# Patient Record
Sex: FEMALE | Race: ASIAN | NOT HISPANIC OR LATINO | URBAN - METROPOLITAN AREA
[De-identification: names, ages, dates, MRNs, and addresses within clinical notes are randomized per-mention and may not be internally consistent; named-entity substitution may affect disease eponyms.]

---

## 2017-08-25 ENCOUNTER — OUTPATIENT (OUTPATIENT)
Dept: OUTPATIENT SERVICES | Facility: HOSPITAL | Age: 83
LOS: 1 days | End: 2017-08-25
Payer: MEDICARE

## 2017-08-25 PROCEDURE — 93970 EXTREMITY STUDY: CPT

## 2017-08-25 PROCEDURE — 93970 EXTREMITY STUDY: CPT | Mod: 26

## 2017-11-08 ENCOUNTER — OUTPATIENT (OUTPATIENT)
Dept: OUTPATIENT SERVICES | Facility: HOSPITAL | Age: 83
LOS: 1 days | End: 2017-11-08

## 2017-11-08 DIAGNOSIS — R06.09 OTHER FORMS OF DYSPNEA: ICD-10-CM

## 2017-11-08 DIAGNOSIS — R07.9 CHEST PAIN, UNSPECIFIED: ICD-10-CM

## 2017-11-08 DIAGNOSIS — R06.9 UNSPECIFIED ABNORMALITIES OF BREATHING: ICD-10-CM

## 2018-10-05 ENCOUNTER — APPOINTMENT (OUTPATIENT)
Dept: NEUROLOGY | Facility: CLINIC | Age: 84
End: 2018-10-05

## 2020-03-13 ENCOUNTER — APPOINTMENT (OUTPATIENT)
Dept: OTOLARYNGOLOGY | Facility: CLINIC | Age: 86
End: 2020-03-13

## 2021-05-19 ENCOUNTER — APPOINTMENT (OUTPATIENT)
Dept: OTOLARYNGOLOGY | Facility: CLINIC | Age: 87
End: 2021-05-19
Payer: MEDICARE

## 2021-05-19 PROCEDURE — 92557 COMPREHENSIVE HEARING TEST: CPT

## 2021-05-19 PROCEDURE — 92550 TYMPANOMETRY & REFLEX THRESH: CPT

## 2021-05-28 ENCOUNTER — APPOINTMENT (OUTPATIENT)
Dept: COLORECTAL SURGERY | Facility: CLINIC | Age: 87
End: 2021-05-28
Payer: MEDICARE

## 2021-05-28 VITALS
BODY MASS INDEX: 29.64 KG/M2 | DIASTOLIC BLOOD PRESSURE: 78 MMHG | WEIGHT: 147 LBS | HEIGHT: 59 IN | SYSTOLIC BLOOD PRESSURE: 128 MMHG | HEART RATE: 81 BPM | TEMPERATURE: 98.2 F

## 2021-05-28 DIAGNOSIS — K62.3 RECTAL PROLAPSE: ICD-10-CM

## 2021-05-28 PROCEDURE — 46600 DIAGNOSTIC ANOSCOPY SPX: CPT

## 2021-05-28 PROCEDURE — 99203 OFFICE O/P NEW LOW 30 MIN: CPT | Mod: 25

## 2021-05-28 NOTE — HISTORY OF PRESENT ILLNESS
[FreeTextEntry1] : 86 y/o Shanghainese/Cantonese and English speaking F presents for evaluation, referred by Dr. Bridgett Garcia ([facility])\par \par Denies abdominal or rectal pain, rectal bleeding or itching\par Denies N/V, change in appetite or weight\par BH:Daily\par Denies constipation, diarrhea or straining\par No use stool softeners, fiber supplements or laxatives\par Admits to limited dietary fiber intake. Currently drinking 2-3 cups of water day\par \par Colonoscopy completed 5/6/21 with Finkelstein\par - mild irregular mucosa\par - semipedunculated polyp, size 12 mm, polypectomy\par - 5 mm sessile polyp rectum, polypectomy\par - mild diverticulosis sigmoid colon\par - 5 mm sessile polyp ascending colon, polypectomy\par - 7 mm sessile polyp cecum colon, polypectomy\par \par Pathology\par A.) colon, cecum, polypectomy: fragments of sessile serrated polyp/adenoma\par B.) colon, ascending, polypectomy: TA\par C.) Rectum, polypectomy: TA, separate fragment of hyperplastic polyp

## 2021-05-28 NOTE — PHYSICAL EXAM
[Excoriation] : no perianal excoriation [Normal] : was normal [None] : there was no rectal mass  [de-identified] : Evidence of right anterior anal verge with mucosal prolapse. No induration. No ulceration. [FreeTextEntry1] : Medical assistant was present for the entire exam.\par \par Anoscopy was performed for evaluation of the patients rectal bleeding  history .\par The risks, benefits and alternatives were reviewed.\par \par A lighted anoscope was passed into the anal canal and the entire anal mucosal surface was inspected..  \par The findings revealed moderate internal hemorrhoids.\par  No masses or lesions were identified.\par \par

## 2021-05-28 NOTE — ASSESSMENT
[FreeTextEntry1] : I reviewed with the patient and her son the findings on examination are consistent with mucosal prolapse.  There is no evidence at this time of malignancy.\par \par Recommend surveillance. Followup 2-3 month repeat evaluation.\par \par Recommend return to the office if there is  pain, bleeding or increase in size.

## 2021-06-30 ENCOUNTER — RESULT REVIEW (OUTPATIENT)
Age: 87
End: 2021-06-30

## 2021-06-30 ENCOUNTER — APPOINTMENT (OUTPATIENT)
Dept: NEPHROLOGY | Facility: CLINIC | Age: 87
End: 2021-06-30
Payer: MEDICARE

## 2021-06-30 VITALS — HEART RATE: 92 BPM | DIASTOLIC BLOOD PRESSURE: 65 MMHG | SYSTOLIC BLOOD PRESSURE: 104 MMHG

## 2021-06-30 DIAGNOSIS — N18.30 CHRONIC KIDNEY DISEASE, STAGE 3 UNSPECIFIED: ICD-10-CM

## 2021-06-30 PROCEDURE — 99204 OFFICE O/P NEW MOD 45 MIN: CPT

## 2021-06-30 RX ORDER — FOLIC ACID/MULTIVIT,IRON,MINER .4-18-35
TABLET,CHEWABLE ORAL
Refills: 0 | Status: ACTIVE | COMMUNITY

## 2021-06-30 RX ORDER — GINKGO BILOBA LEAF EXTRACT 60 MG
CAPSULE ORAL
Refills: 0 | Status: ACTIVE | COMMUNITY

## 2021-06-30 RX ORDER — ASPIRIN 81 MG/1
81 TABLET ORAL
Refills: 0 | Status: ACTIVE | COMMUNITY

## 2021-07-07 PROBLEM — N18.30 CKD (CHRONIC KIDNEY DISEASE), STAGE III: Status: RESOLVED | Noted: 2021-06-30 | Resolved: 2021-07-07

## 2021-07-07 NOTE — PHYSICAL EXAM
[General Appearance - Alert] : alert [General Appearance - In No Acute Distress] : in no acute distress [Sclera] : the sclera and conjunctiva were normal [PERRL With Normal Accommodation] : pupils were equal in size, round, and reactive to light [Extraocular Movements] : extraocular movements were intact [Auscultation Breath Sounds / Voice Sounds] : lungs were clear to auscultation bilaterally [Heart Rate And Rhythm] : heart rate was normal and rhythm regular [Heart Sounds] : normal S1 and S2 [Heart Sounds Gallop] : no gallops [Murmurs] : no murmurs [Heart Sounds Pericardial Friction Rub] : no pericardial rub [Abdomen Soft] : soft [Abdomen Tenderness] : non-tender [] : no hepato-splenomegaly [Abdomen Mass (___ Cm)] : no abdominal mass palpated [No CVA Tenderness] : no ~M costovertebral angle tenderness [Musculoskeletal - Swelling] : no joint swelling seen [FreeTextEntry1] : No oedema

## 2021-07-07 NOTE — ASSESSMENT
[FreeTextEntry1] : 89y/o F born in [facility] with a PMH of HTN (unclear number of years) prevents with referral for CKID.\par fu\par #CKD3 likely long standing related to age and HTN- stable\par UA bland, no GN w/u necessary, no DM\par Check renal/bladder sono- r/o obstruction\par Repeat BMP, cystatin C\par \par #BP controlled on valsartan 40mg daily- denies any hypotensive episodes\par #No anaemia- hgb stable\par #Vit D deficiency on vit D3 supplements\par \par RTC in 1 year or PRN\par \par Mariela Pinto MD\par PGY-4

## 2021-07-07 NOTE — HISTORY OF PRESENT ILLNESS
[FreeTextEntry1] : 89y/o F born in [facility] with a PMH of HTN (unclear number of years) prevents with referral for CKID.\par \par PCP: Dr. Bridgett Garcia\par \par Denies NSAIDs, PPI use. Denies any Fam Hx of CKD, dialysis, transplant need. Denies dysuria, frothy urine, haematuria. \par GFR in 2015 58-68; GFR in 2/2020 58-68 UA bland\par 2/2021- GFR 49-57; A1c 5.8, Hgb 13.7, Vit D 35

## 2021-07-15 ENCOUNTER — APPOINTMENT (OUTPATIENT)
Dept: ULTRASOUND IMAGING | Facility: HOSPITAL | Age: 87
End: 2021-07-15
Payer: MEDICARE

## 2021-07-15 ENCOUNTER — OUTPATIENT (OUTPATIENT)
Dept: OUTPATIENT SERVICES | Facility: HOSPITAL | Age: 87
LOS: 1 days | End: 2021-07-15
Payer: COMMERCIAL

## 2021-07-15 PROCEDURE — 76770 US EXAM ABDO BACK WALL COMP: CPT

## 2021-07-15 PROCEDURE — 76770 US EXAM ABDO BACK WALL COMP: CPT | Mod: 26

## 2021-07-30 LAB
ANION GAP SERPL CALC-SCNC: 14 MMOL/L
APPEARANCE: CLEAR
BILIRUBIN URINE: NEGATIVE
BLOOD URINE: NEGATIVE
BUN SERPL-MCNC: 17 MG/DL
CALCIUM SERPL-MCNC: 9.1 MG/DL
CHLORIDE SERPL-SCNC: 99 MMOL/L
CO2 SERPL-SCNC: 24 MMOL/L
COLOR: YELLOW
CREAT SERPL-MCNC: 1.06 MG/DL
CREAT SPEC-SCNC: 76 MG/DL
CREAT SPEC-SCNC: 76 MG/DL
CREAT/PROT UR: 0.1 RATIO
CYSTATIN C SERPL-MCNC: 1.44 MG/L
GFR/BSA.PRED SERPLBLD CYS-BASED-ARV: 40 ML/MIN
GLUCOSE QUALITATIVE U: NEGATIVE
GLUCOSE SERPL-MCNC: 118 MG/DL
KETONES URINE: NEGATIVE
LEUKOCYTE ESTERASE URINE: ABNORMAL
MICROALBUMIN 24H UR DL<=1MG/L-MCNC: <1.2 MG/DL
MICROALBUMIN/CREAT 24H UR-RTO: NORMAL MG/G
NITRITE URINE: NEGATIVE
PH URINE: 6
POTASSIUM SERPL-SCNC: 4.6 MMOL/L
PROT UR-MCNC: 7 MG/DL
PROTEIN URINE: NEGATIVE
SODIUM SERPL-SCNC: 137 MMOL/L
SPECIFIC GRAVITY URINE: 1.01
UROBILINOGEN URINE: NORMAL

## 2021-08-27 ENCOUNTER — APPOINTMENT (OUTPATIENT)
Dept: COLORECTAL SURGERY | Facility: CLINIC | Age: 87
End: 2021-08-27

## 2022-06-19 NOTE — END OF VISIT
[] : Fellow [FreeTextEntry3] : . [Time Spent: ___ minutes] : I have spent [unfilled] minutes of time on the encounter. No

## 2022-11-02 ENCOUNTER — EMERGENCY (EMERGENCY)
Facility: HOSPITAL | Age: 88
LOS: 1 days | Discharge: HOME | End: 2022-11-02
Attending: EMERGENCY MEDICINE | Admitting: EMERGENCY MEDICINE
Payer: MEDICARE

## 2022-11-02 VITALS
OXYGEN SATURATION: 96 % | RESPIRATION RATE: 18 BRPM | HEIGHT: 59 IN | WEIGHT: 160.94 LBS | SYSTOLIC BLOOD PRESSURE: 170 MMHG | DIASTOLIC BLOOD PRESSURE: 66 MMHG | HEART RATE: 79 BPM | TEMPERATURE: 98 F

## 2022-11-02 VITALS
SYSTOLIC BLOOD PRESSURE: 172 MMHG | OXYGEN SATURATION: 97 % | TEMPERATURE: 98 F | HEART RATE: 89 BPM | RESPIRATION RATE: 18 BRPM | DIASTOLIC BLOOD PRESSURE: 68 MMHG

## 2022-11-02 LAB
ALBUMIN SERPL ELPH-MCNC: 4.1 G/DL — SIGNIFICANT CHANGE UP (ref 3.3–5)
ALP SERPL-CCNC: 97 U/L — SIGNIFICANT CHANGE UP (ref 40–120)
ALT FLD-CCNC: 15 U/L — SIGNIFICANT CHANGE UP (ref 10–45)
ANION GAP SERPL CALC-SCNC: 10 MMOL/L — SIGNIFICANT CHANGE UP (ref 5–17)
APPEARANCE UR: ABNORMAL
AST SERPL-CCNC: 20 U/L — SIGNIFICANT CHANGE UP (ref 10–40)
BASOPHILS # BLD AUTO: 0.09 K/UL — SIGNIFICANT CHANGE UP (ref 0–0.2)
BASOPHILS NFR BLD AUTO: 1 % — SIGNIFICANT CHANGE UP (ref 0–2)
BILIRUB SERPL-MCNC: 0.3 MG/DL — SIGNIFICANT CHANGE UP (ref 0.2–1.2)
BILIRUB UR-MCNC: NEGATIVE — SIGNIFICANT CHANGE UP
BUN SERPL-MCNC: 14 MG/DL — SIGNIFICANT CHANGE UP (ref 7–23)
CALCIUM SERPL-MCNC: 8.8 MG/DL — SIGNIFICANT CHANGE UP (ref 8.4–10.5)
CHLORIDE SERPL-SCNC: 94 MMOL/L — LOW (ref 96–108)
CO2 SERPL-SCNC: 26 MMOL/L — SIGNIFICANT CHANGE UP (ref 22–31)
COLOR SPEC: YELLOW — SIGNIFICANT CHANGE UP
CREAT SERPL-MCNC: 0.7 MG/DL — SIGNIFICANT CHANGE UP (ref 0.5–1.3)
DIFF PNL FLD: NEGATIVE — SIGNIFICANT CHANGE UP
EGFR: 83 ML/MIN/1.73M2 — SIGNIFICANT CHANGE UP
EOSINOPHIL # BLD AUTO: 0.43 K/UL — SIGNIFICANT CHANGE UP (ref 0–0.5)
EOSINOPHIL NFR BLD AUTO: 4.8 % — SIGNIFICANT CHANGE UP (ref 0–6)
GLUCOSE SERPL-MCNC: 110 MG/DL — HIGH (ref 70–99)
GLUCOSE UR QL: NEGATIVE — SIGNIFICANT CHANGE UP
HCT VFR BLD CALC: 37.9 % — SIGNIFICANT CHANGE UP (ref 34.5–45)
HGB BLD-MCNC: 12.8 G/DL — SIGNIFICANT CHANGE UP (ref 11.5–15.5)
IMM GRANULOCYTES NFR BLD AUTO: 0.2 % — SIGNIFICANT CHANGE UP (ref 0–0.9)
KETONES UR-MCNC: NEGATIVE — SIGNIFICANT CHANGE UP
LEUKOCYTE ESTERASE UR-ACNC: NEGATIVE — SIGNIFICANT CHANGE UP
LYMPHOCYTES # BLD AUTO: 2.49 K/UL — SIGNIFICANT CHANGE UP (ref 1–3.3)
LYMPHOCYTES # BLD AUTO: 27.7 % — SIGNIFICANT CHANGE UP (ref 13–44)
MCHC RBC-ENTMCNC: 29 PG — SIGNIFICANT CHANGE UP (ref 27–34)
MCHC RBC-ENTMCNC: 33.8 GM/DL — SIGNIFICANT CHANGE UP (ref 32–36)
MCV RBC AUTO: 85.7 FL — SIGNIFICANT CHANGE UP (ref 80–100)
MONOCYTES # BLD AUTO: 0.95 K/UL — HIGH (ref 0–0.9)
MONOCYTES NFR BLD AUTO: 10.6 % — SIGNIFICANT CHANGE UP (ref 2–14)
NEUTROPHILS # BLD AUTO: 5.01 K/UL — SIGNIFICANT CHANGE UP (ref 1.8–7.4)
NEUTROPHILS NFR BLD AUTO: 55.7 % — SIGNIFICANT CHANGE UP (ref 43–77)
NITRITE UR-MCNC: NEGATIVE — SIGNIFICANT CHANGE UP
NRBC # BLD: 0 /100 WBCS — SIGNIFICANT CHANGE UP (ref 0–0)
PH UR: 6 — SIGNIFICANT CHANGE UP (ref 5–8)
PLATELET # BLD AUTO: 413 K/UL — HIGH (ref 150–400)
POTASSIUM SERPL-MCNC: 4.4 MMOL/L — SIGNIFICANT CHANGE UP (ref 3.5–5.3)
POTASSIUM SERPL-SCNC: 4.4 MMOL/L — SIGNIFICANT CHANGE UP (ref 3.5–5.3)
PROT SERPL-MCNC: 7.8 G/DL — SIGNIFICANT CHANGE UP (ref 6–8.3)
PROT UR-MCNC: NEGATIVE MG/DL — SIGNIFICANT CHANGE UP
RBC # BLD: 4.42 M/UL — SIGNIFICANT CHANGE UP (ref 3.8–5.2)
RBC # FLD: 13.2 % — SIGNIFICANT CHANGE UP (ref 10.3–14.5)
SODIUM SERPL-SCNC: 130 MMOL/L — LOW (ref 135–145)
SP GR SPEC: 1.01 — SIGNIFICANT CHANGE UP (ref 1–1.03)
UROBILINOGEN FLD QL: 0.2 E.U./DL — SIGNIFICANT CHANGE UP
WBC # BLD: 8.99 K/UL — SIGNIFICANT CHANGE UP (ref 3.8–10.5)
WBC # FLD AUTO: 8.99 K/UL — SIGNIFICANT CHANGE UP (ref 3.8–10.5)

## 2022-11-02 PROCEDURE — 80053 COMPREHEN METABOLIC PANEL: CPT

## 2022-11-02 PROCEDURE — 93010 ELECTROCARDIOGRAM REPORT: CPT

## 2022-11-02 PROCEDURE — 36415 COLL VENOUS BLD VENIPUNCTURE: CPT

## 2022-11-02 PROCEDURE — 99283 EMERGENCY DEPT VISIT LOW MDM: CPT

## 2022-11-02 PROCEDURE — 83735 ASSAY OF MAGNESIUM: CPT

## 2022-11-02 PROCEDURE — 93005 ELECTROCARDIOGRAM TRACING: CPT

## 2022-11-02 PROCEDURE — 85025 COMPLETE CBC W/AUTO DIFF WBC: CPT

## 2022-11-02 RX ORDER — SODIUM CHLORIDE 9 MG/ML
1000 INJECTION INTRAMUSCULAR; INTRAVENOUS; SUBCUTANEOUS ONCE
Refills: 0 | Status: COMPLETED | OUTPATIENT
Start: 2022-11-02 | End: 2022-11-02

## 2022-11-02 RX ADMIN — SODIUM CHLORIDE 1000 MILLILITER(S): 9 INJECTION INTRAMUSCULAR; INTRAVENOUS; SUBCUTANEOUS at 21:33

## 2022-11-02 NOTE — ED ADULT NURSE NOTE - OBJECTIVE STATEMENT
Pt reports low Na of 129 from blood work yesterday at PCP. Pt denies any symptoms at this time. No CP/SOB, no dizziness/ lightheadedness, no confusion. Pt AOx2 at baseline. Pt ambulatory with steady gait. Pt daughter at bedside.

## 2022-11-02 NOTE — ED ADULT NURSE NOTE - NS ED NURSE LEVEL OF CONSCIOUSNESS ORIENTATION
I will START or STAY ON the medications listed below when I get home from the hospital:    aspirin 81 mg oral delayed release tablet  -- 1 tab(s) by mouth once a day  -- Indication: For keep stent open    Benicar 40 mg oral tablet  -- 1 tab(s) by mouth once a day  -- Indication: For for high blood pressure    rivaroxaban 15 mg oral tablet  -- 1 tab(s) by mouth every 24 hours  -- Indication: For for afib    glimepiride 2 mg oral tablet  -- 1 tab(s) by mouth once a day  -- Indication: For for diabetes    fenofibrate 145 mg oral tablet  -- 1 tab(s) by mouth once a day  -- Indication: For to lower triglyceride    Crestor 20 mg oral tablet  -- 1 tab(s) by mouth once a day  -- Indication: For for high cholesterol    clopidogrel 75 mg oral tablet  -- 1 tab(s) by mouth once a day  -- Indication: For keep stent open    ALPRAZolam 1 mg oral tablet  -- 1 tab(s) by mouth once a day  -- Indication: For for anxiety    zolpidem 10 mg oral tablet  -- 1 tab(s) by mouth once a day (at bedtime)  -- Indication: For for sleep    Toprol- mg oral tablet, extended release  -- 1 tab(s) by mouth once a day  -- Indication: For for high blood presure    Boniva 150 mg oral tablet  -- 1 tab(s) by mouth once a month  -- Indication: For for postmenapausal osteoporosis    AcipHex 20 mg oral delayed release tablet  -- 1 tab(s) by mouth once a day  -- Indication: For anti acid
Age appropriate behavior

## 2022-11-02 NOTE — ED PROVIDER NOTE - OBJECTIVE STATEMENT
Pt w/ PMHx HTN on Valsartan, HLD on Atorvastatin referred to the ED for Na 129. Pt states she feels well and does not known why she is here. She is accompanied by her daughter whom states she is at her baseline MS. No HA, confusion, seizures. No known GI losses. No diuretic use.

## 2022-11-02 NOTE — ED ADULT NURSE NOTE - NSICDXPASTMEDICALHX_GEN_ALL_CORE_FT
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

## 2022-11-02 NOTE — ED PROVIDER NOTE - NSFOLLOWUPINSTRUCTIONS_ED_ALL_ED_FT
You were referred the ED for low sodium (hyponatremia). This was a mildly sodium (130).  You received 1 liter of normal saline. Please follow up with your primary doctor for further evaluation.      Hyponatremia      Hyponatremia is when the amount of salt (sodium) in a person's blood is too low. When sodium levels are low, the cells absorb extra water, which causes them to swell. The swelling happens throughout the body, but it mostly affects the brain.      What are the causes?    This condition may be caused by:•Certain medical conditions, such as:  •Heart, kidney, or liver problems.      •Thyroid problems.      •Adrenal gland problems.      •Metabolic conditions, such as [place]'s disease or syndrome of inappropriate antidiuresis (SIAD).        •Excessive vomiting, diarrhea, or sweating.      •Certain medicines or illegal drugs.      •Fluids given through an IV.        What increases the risk?    You are more likely to develop this condition if you:  •Have certain medical conditions such as heart, kidney, or liver failure.      •Have a medical condition that causes frequent or excessive diarrhea.      •Participate in intense physical activities, such as marathon running.    •Take certain medicines that affect the sodium and fluid balance in the blood. Some of these medicine types include:  •Diuretics.      •NSAIDs, such as ibuprofen.      •Some opioid pain medicines.      •Some antidepressants.      •Some seizure prevention medicines.          What are the signs or symptoms?    Symptoms of this condition include:  •Headache.      •Nausea and vomiting.      •Being very tired (lethargic).      •Muscle weakness and cramping.      •Loss of appetite.      •Feeling weak or light-headed.      Severe symptoms of this condition include:  •Confusion.      •Agitation.      •Having a rapid heart rate.      •Fainting.      •Seizures.      •Coma.        How is this diagnosed?    This condition is diagnosed based on:  •A physical exam.      •Your medical history.    •Tests, including:  •Blood tests.      •Urine tests.          How is this treated?  A person receiving fluids through an IV in the arm. The person is in a hospital bed.   Treatment for this condition depends on the cause. Treatment may include:  •Getting fluids through an IV that is inserted into one of your veins.      •Medicines to correct the sodium imbalance. If medicines are causing the condition, the medicines will need to be adjusted.      •Limiting your water or fluid intake to get the correct sodium balance, in certain cases.      •Monitoring in the hospital to closely watch your symptoms for improvement.        Follow these instructions at home:  A sign showing that a person should not drink alcohol.   •Take over-the-counter and prescription medicines only as told by your health care provider. Many medicines can make this condition worse. Talk with your health care provider about any medicines that you are currently taking.      • Do not drink alcohol.      •Keep all follow-up visits. This is important.        Contact a health care provider if:    •You develop worsening nausea, fatigue, headache, confusion, or weakness.      •Your symptoms go away and then return.        Get help right away if:    •You have a seizure.      •You faint.      •You have ongoing diarrhea or vomiting.        Summary    •Hyponatremia is when the amount of salt (sodium) in your blood is too low.      •When sodium levels are low, your cells absorb extra water, which causes them to swell.      •The swelling happens throughout the body, but it mostly affects the brain.      •Treatment for this condition depends on the cause. It may include receiving IV fluids, taking or adjusting medicines, limiting fluid intake, and monitoring in the hospital.      This information is not intended to replace advice given to you by your health care provider. Make sure you discuss any questions you have with your health care provider.

## 2022-11-02 NOTE — ED PROVIDER NOTE - PHYSICAL EXAMINATION
Constitutional: Well appearing, awake, alert, oriented to person, place, and in no apparent distress.  ENMT: Airway patent. mildly dry MM  Eyes: Clear bilaterally  Cardiac: Normal rate, regular rhythm.  Heart sounds S1, S2.  No murmurs, rubs or gallops.  Respiratory: Breaths sounds equal and clear b/l. No increased WOB, tachypnea, hypoxia, or accessory mm use. Pt speaks in full sentences.   Gastrointestinal: Abd soft, NT, ND, NABS. No guarding, rebound, or rigidity. No pulsatile abdominal masses.   Musculoskeletal: Range of motion is not limited  Neuro: Alert and oriented x 2, face symmetric and speech fluent. Strength 5/5 x 4 ext and symmetric, nml gross motor movement, nml gait. No focal deficits noted.  Skin: Skin normal color for race, warm, dry and intact. No evidence of rash.  Psych: Alert and oriented to person, place, normal mood and affect. no apparent risk to self or others.

## 2022-11-02 NOTE — ED PROVIDER NOTE - PATIENT PORTAL LINK FT
You can access the FollowMyHealth Patient Portal offered by [facility] by registering at the following website: [email or web address]. By joining [facility]’s FollowMyHealth portal, you will also be able to view your health information using other applications (apps) compatible with our system.

## 2022-11-02 NOTE — ED PROVIDER NOTE - CLINICAL SUMMARY MEDICAL DECISION MAKING FREE TEXT BOX
Hyponatremia, mild. Asymptomatic. IVF. Will d/c w/ f/u PCP Hyponatremia, mild. Asymptomatic. no clear etiology. NO GI losses. No diuretic use. Tolerating PO well. No  IVF. Will d/c w/ f/u PCP

## 2022-11-04 DIAGNOSIS — I10 ESSENTIAL (PRIMARY) HYPERTENSION: ICD-10-CM

## 2022-11-04 DIAGNOSIS — E87.1 HYPO-OSMOLALITY AND HYPONATREMIA: ICD-10-CM

## 2022-11-04 DIAGNOSIS — E78.5 HYPERLIPIDEMIA, UNSPECIFIED: ICD-10-CM

## 2022-11-15 ENCOUNTER — APPOINTMENT (OUTPATIENT)
Dept: NEPHROLOGY | Facility: CLINIC | Age: 88
End: 2022-11-15

## 2022-11-15 VITALS
DIASTOLIC BLOOD PRESSURE: 88 MMHG | SYSTOLIC BLOOD PRESSURE: 165 MMHG | RESPIRATION RATE: 16 BRPM | HEART RATE: 70 BPM | OXYGEN SATURATION: 99 %

## 2022-11-15 DIAGNOSIS — G93.89 OTHER SPECIFIED DISORDERS OF BRAIN: ICD-10-CM

## 2022-11-15 PROCEDURE — 99215 OFFICE O/P EST HI 40 MIN: CPT

## 2022-11-28 LAB
ALBUMIN SERPL ELPH-MCNC: 4.2 G/DL
ANION GAP SERPL CALC-SCNC: 15 MMOL/L
APPEARANCE: CLEAR
BACTERIA: NEGATIVE
BILIRUBIN URINE: NEGATIVE
BLOOD URINE: NEGATIVE
BUN SERPL-MCNC: 13 MG/DL
CALCIUM SERPL-MCNC: 9.4 MG/DL
CHLORIDE ?TM UR-SCNC: 43 MMOL/L
CHLORIDE SERPL-SCNC: 90 MMOL/L
CO2 SERPL-SCNC: 24 MMOL/L
COLOR: YELLOW
CORTIS SERPL-MCNC: 8.1 UG/DL
CREAT SERPL-MCNC: 0.74 MG/DL
CREAT SPEC-SCNC: 61 MG/DL
CREAT/PROT UR: 0.1 RATIO
CYSTATIN C SERPL-MCNC: 1.45 MG/L
EGFR: 78 ML/MIN/1.73M2
GFR/BSA.PRED SERPLBLD CYS-BASED-ARV: 40 ML/MIN/1.73M2
GLUCOSE QUALITATIVE U: NEGATIVE
GLUCOSE SERPL-MCNC: 95 MG/DL
HYALINE CASTS: 1 /LPF
KETONES URINE: NEGATIVE
LEUKOCYTE ESTERASE URINE: ABNORMAL
MICROSCOPIC-UA: NORMAL
NITRITE URINE: NEGATIVE
OSMOLALITY SERPL: 272 MOSMOL/KG
OSMOLALITY UR: 319 MOSM/KG
PH URINE: 6.5
PHOSPHATE SERPL-MCNC: 4.2 MG/DL
POTASSIUM SERPL-SCNC: 4.6 MMOL/L
POTASSIUM UR-SCNC: 38.9 MMOL/L
PROT UR-MCNC: 7 MG/DL
PROTEIN URINE: NEGATIVE
RED BLOOD CELLS URINE: 3 /HPF
SODIUM ?TM SUB UR QN: 38 MMOL/L
SODIUM SERPL-SCNC: 130 MMOL/L
SPECIFIC GRAVITY URINE: 1.01
SQUAMOUS EPITHELIAL CELLS: 6 /HPF
T3 SERPL-MCNC: 119 NG/DL
T4 SERPL-MCNC: 8.7 UG/DL
TSH SERPL-ACNC: 1.78 UIU/ML
UROBILINOGEN URINE: NORMAL
WHITE BLOOD CELLS URINE: 2 /HPF

## 2022-11-28 NOTE — HISTORY OF PRESENT ILLNESS
[FreeTextEntry1] : 88y/o F born in [facility] with here for f/u of HTN, reduced eGFR and euvolemic hyponatremia \par \par PCP: Dr. Bridgett Garcia\par \par Daughter with pt today. Went to ER for hyponatremia 129-132 over the last 2 weeks with urine SG =/> 10.10 \par Doesn't eat much protein. Drinks several glasses of chinese tea, one coffee, maybe one bottle of water daily, 1-2 glasses of milk.  No excessive thirst or polyuria. \par No edema. Gained weight\par No headache, visual changes, dizziness or new neurological weakness. \par \par OTC: GinkoBiloba use for the last few years. MVM, Vit D. No nsaid use.\par

## 2022-11-28 NOTE — ASSESSMENT
[FreeTextEntry1] : 89y/o F born in [facility] with here for f/u of HTN, reduced eGFR and euvolemic hyponatremia \par \par #CKD3 likely long standing related to age and HTN- stable\par UA bland, no GN w/u necessary, no DM. HTN well controlled \par Check renal/bladder sono- r/o obstruction\par \par # Chronic seemingly euvolemic hyponatremia - reviewed diet in detail, fluid restrict 1.5L or less, liberalize Na in diet, increase protein intake by using protein shakes. \par \par Plan\par - check kidney function, u/a with urine Na and osm as well as serum osm, TSH, cortisol\par \par Addendum: reviewed labs, Na 130, Cr stable. Hypoosmolar euvolemic hyponatremia asymptomatic and may be related to her poor protein intake in the setting of large volume of intake. Uosm 300s urins NA 38\par \par

## 2023-01-10 ENCOUNTER — APPOINTMENT (OUTPATIENT)
Dept: NEPHROLOGY | Facility: CLINIC | Age: 89
End: 2023-01-10
Payer: MEDICARE

## 2023-01-10 DIAGNOSIS — I10 ESSENTIAL (PRIMARY) HYPERTENSION: ICD-10-CM

## 2023-01-10 DIAGNOSIS — E55.9 VITAMIN D DEFICIENCY, UNSPECIFIED: ICD-10-CM

## 2023-01-10 DIAGNOSIS — N18.31 CHRONIC KIDNEY DISEASE, STAGE 3A: ICD-10-CM

## 2023-01-10 DIAGNOSIS — D32.0 BENIGN NEOPLASM OF CEREBRAL MENINGES: ICD-10-CM

## 2023-01-10 DIAGNOSIS — E87.1 HYPO-OSMOLALITY AND HYPONATREMIA: ICD-10-CM

## 2023-01-10 PROCEDURE — 99214 OFFICE O/P EST MOD 30 MIN: CPT

## 2023-01-13 NOTE — HISTORY OF PRESENT ILLNESS
[FreeTextEntry1] : 88y/o F born in [facility] here for f/u of HTN, reduced eGFR and euvolemic hyponatremia likely SIADH\par \par PCP: Dr. Bridgett Garcia\par \par Daughter with pt today. She has been doing well, no excessive thirst and following fluid restriction w/o issue. \par No headache, visual changes, dizziness or new neurological weakness. \par \par OTC: GinkoBiloba use for the last few years. MVM, Vit D. No nsaid use.\par

## 2023-01-13 NOTE — ASSESSMENT
[FreeTextEntry1] : 89y/o F born in [facility] here for f/u of HTN, reduced eGFR and euvolemic hyponatremia likely SIADH\par \par #CKD3 likely long standing related to age and HTN- stable\par UA bland, HTN well controlled \par \par # Chronic seemingly euvolemic hyponatremia - reviewed diet in detail, fluid restrict 1.5L or less, liberalize Na in diet, increase protein intake by using protein shakes. \par \par Plan\par - check kidney function, u/a with urine Na and osm as well as serum osm, TSH, cortisol\par \par \par \par

## 2023-02-28 LAB
ALBUMIN SERPL ELPH-MCNC: 3.8 G/DL
ALP BLD-CCNC: 107 U/L
ALT SERPL-CCNC: 18 U/L
ANION GAP SERPL CALC-SCNC: 10 MMOL/L
APPEARANCE: ABNORMAL
AST SERPL-CCNC: 21 U/L
BACTERIA: NEGATIVE
BILIRUB SERPL-MCNC: 0.4 MG/DL
BILIRUBIN URINE: NEGATIVE
BLOOD URINE: NEGATIVE
BUN SERPL-MCNC: 11 MG/DL
CALCIUM SERPL-MCNC: 9.4 MG/DL
CHLORIDE SERPL-SCNC: 93 MMOL/L
CO2 SERPL-SCNC: 29 MMOL/L
COLOR: YELLOW
CREAT SERPL-MCNC: 0.73 MG/DL
CREAT SPEC-SCNC: 88 MG/DL
CREAT/PROT UR: 0.1 RATIO
EGFR: 79 ML/MIN/1.73M2
GLUCOSE QUALITATIVE U: NEGATIVE
GLUCOSE SERPL-MCNC: 101 MG/DL
HYALINE CASTS: 2 /LPF
KETONES URINE: NEGATIVE
LEUKOCYTE ESTERASE URINE: NEGATIVE
MICROSCOPIC-UA: NORMAL
NITRITE URINE: NEGATIVE
OSMOLALITY UR: 466 MOSM/KG
PH URINE: 7
POTASSIUM SERPL-SCNC: 4.7 MMOL/L
PROT SERPL-MCNC: 7.5 G/DL
PROT UR-MCNC: 8 MG/DL
PROTEIN URINE: NEGATIVE
RED BLOOD CELLS URINE: 2 /HPF
SODIUM ?TM SUB UR QN: 102 MMOL/L
SODIUM SERPL-SCNC: 132 MMOL/L
SPECIFIC GRAVITY URINE: 1.01
SQUAMOUS EPITHELIAL CELLS: 12 /HPF
URATE SERPL-MCNC: 7.1 MG/DL
URATE UR-MCNC: 27.5 MG/DL
UROBILINOGEN URINE: NORMAL
WHITE BLOOD CELLS URINE: 2 /HPF

## 2023-06-30 ENCOUNTER — APPOINTMENT (OUTPATIENT)
Dept: NEPHROLOGY | Facility: CLINIC | Age: 89
End: 2023-06-30

## 2025-02-11 ENCOUNTER — APPOINTMENT (OUTPATIENT)
Dept: ULTRASOUND IMAGING | Facility: CLINIC | Age: 89
End: 2025-02-11

## 2025-02-11 ENCOUNTER — APPOINTMENT (OUTPATIENT)
Dept: MAMMOGRAPHY | Facility: CLINIC | Age: 89
End: 2025-02-11
Payer: MEDICARE

## 2025-02-11 ENCOUNTER — APPOINTMENT (OUTPATIENT)
Dept: ULTRASOUND IMAGING | Facility: CLINIC | Age: 89
End: 2025-02-11
Payer: MEDICARE

## 2025-02-11 PROCEDURE — 19084Z: CUSTOM

## 2025-02-11 PROCEDURE — 19083 BX BREAST 1ST LESION US IMAG: CPT | Mod: LT

## 2025-02-11 PROCEDURE — 76641 ULTRASOUND BREAST COMPLETE: CPT | Mod: 50

## 2025-02-11 PROCEDURE — A4648: CPT

## 2025-02-21 ENCOUNTER — NON-APPOINTMENT (OUTPATIENT)
Age: 89
End: 2025-02-21

## 2025-02-21 ENCOUNTER — APPOINTMENT (OUTPATIENT)
Dept: BREAST CENTER | Facility: CLINIC | Age: 89
End: 2025-02-21

## 2025-02-21 ENCOUNTER — LABORATORY RESULT (OUTPATIENT)
Age: 89
End: 2025-02-21

## 2025-02-21 VITALS
SYSTOLIC BLOOD PRESSURE: 121 MMHG | DIASTOLIC BLOOD PRESSURE: 67 MMHG | BODY MASS INDEX: 32.66 KG/M2 | HEIGHT: 59 IN | HEART RATE: 89 BPM | WEIGHT: 162 LBS

## 2025-02-21 DIAGNOSIS — R92.8 OTHER ABNORMAL AND INCONCLUSIVE FINDINGS ON DIAGNOSTIC IMAGING OF BREAST: ICD-10-CM

## 2025-02-21 DIAGNOSIS — Z17.421 MALIGNANT NEOPLASM OF UNSPECIFIED SITE OF UNSPECIFIED FEMALE BREAST: ICD-10-CM

## 2025-02-21 DIAGNOSIS — Z78.9 OTHER SPECIFIED HEALTH STATUS: ICD-10-CM

## 2025-02-21 DIAGNOSIS — C50.919 MALIGNANT NEOPLASM OF UNSPECIFIED SITE OF UNSPECIFIED FEMALE BREAST: ICD-10-CM

## 2025-02-21 LAB
BASOPHILS # BLD AUTO: 0.04 K/UL
BASOPHILS NFR BLD AUTO: 0.5 %
EOSINOPHIL # BLD AUTO: 0.48 K/UL
EOSINOPHIL NFR BLD AUTO: 6.2 %
HCT VFR BLD CALC: 41.3 %
HGB BLD-MCNC: 12.9 G/DL
IMM GRANULOCYTES NFR BLD AUTO: 0.4 %
LYMPHOCYTES # BLD AUTO: 1.84 K/UL
LYMPHOCYTES NFR BLD AUTO: 23.8 %
MAN DIFF?: NORMAL
MCHC RBC-ENTMCNC: 27.5 PG
MCHC RBC-ENTMCNC: 31.2 G/DL
MCV RBC AUTO: 88.1 FL
MONOCYTES # BLD AUTO: 0.98 K/UL
MONOCYTES NFR BLD AUTO: 12.7 %
NEUTROPHILS # BLD AUTO: 4.35 K/UL
NEUTROPHILS NFR BLD AUTO: 56.4 %
PLATELET # BLD AUTO: 432 K/UL
PMV BLD AUTO: 0 /100 WBCS
RBC # BLD: 4.69 M/UL
RBC # FLD: 15.9 %
WBC # FLD AUTO: 7.72 K/UL

## 2025-02-21 PROCEDURE — 99205 OFFICE O/P NEW HI 60 MIN: CPT

## 2025-02-28 ENCOUNTER — APPOINTMENT (OUTPATIENT)
Dept: NUCLEAR MEDICINE | Facility: HOSPITAL | Age: 89
End: 2025-02-28

## 2025-02-28 ENCOUNTER — OUTPATIENT (OUTPATIENT)
Dept: OUTPATIENT SERVICES | Facility: HOSPITAL | Age: 89
LOS: 1 days | End: 2025-02-28
Payer: MEDICARE

## 2025-02-28 ENCOUNTER — NON-APPOINTMENT (OUTPATIENT)
Age: 89
End: 2025-02-28

## 2025-02-28 ENCOUNTER — APPOINTMENT (OUTPATIENT)
Dept: MRI IMAGING | Facility: CLINIC | Age: 89
End: 2025-02-28

## 2025-02-28 PROCEDURE — 78815 PET IMAGE W/CT SKULL-THIGH: CPT

## 2025-02-28 PROCEDURE — 82962 GLUCOSE BLOOD TEST: CPT

## 2025-02-28 PROCEDURE — 78815 PET IMAGE W/CT SKULL-THIGH: CPT | Mod: 26,PI

## 2025-02-28 PROCEDURE — A9552: CPT

## 2025-03-07 ENCOUNTER — NON-APPOINTMENT (OUTPATIENT)
Age: 89
End: 2025-03-07

## 2025-03-11 ENCOUNTER — NON-APPOINTMENT (OUTPATIENT)
Age: 89
End: 2025-03-11

## 2025-03-11 ENCOUNTER — APPOINTMENT (OUTPATIENT)
Dept: HEMATOLOGY ONCOLOGY | Facility: CLINIC | Age: 89
End: 2025-03-11
Payer: MEDICARE

## 2025-03-11 DIAGNOSIS — N18.30 CHRONIC KIDNEY DISEASE, STAGE 3 UNSPECIFIED: ICD-10-CM

## 2025-03-11 DIAGNOSIS — N18.31 CHRONIC KIDNEY DISEASE, STAGE 3A: ICD-10-CM

## 2025-03-11 DIAGNOSIS — Z17.421 MALIGNANT NEOPLASM OF UNSPECIFIED SITE OF UNSPECIFIED FEMALE BREAST: ICD-10-CM

## 2025-03-11 DIAGNOSIS — D32.0 BENIGN NEOPLASM OF CEREBRAL MENINGES: ICD-10-CM

## 2025-03-11 DIAGNOSIS — Z86.79 PERSONAL HISTORY OF OTHER DISEASES OF THE CIRCULATORY SYSTEM: ICD-10-CM

## 2025-03-11 DIAGNOSIS — C50.919 MALIGNANT NEOPLASM OF UNSPECIFIED SITE OF UNSPECIFIED FEMALE BREAST: ICD-10-CM

## 2025-03-11 DIAGNOSIS — C77.3 SECONDARY AND UNSPECIFIED MALIGNANT NEOPLASM OF AXILLA AND UPPER LIMB LYMPH NODES: ICD-10-CM

## 2025-03-11 DIAGNOSIS — F03.90 UNSPECIFIED DEMENTIA W/OUT BEHAVIORAL DISTURBANCE: ICD-10-CM

## 2025-03-11 DIAGNOSIS — C50.912 MALIGNANT NEOPLASM OF UNSPECIFIED SITE OF LEFT FEMALE BREAST: ICD-10-CM

## 2025-03-11 DIAGNOSIS — I10 ESSENTIAL (PRIMARY) HYPERTENSION: ICD-10-CM

## 2025-03-11 DIAGNOSIS — C77.0 MALIGNANT NEOPLASM OF UNSPECIFIED SITE OF LEFT FEMALE BREAST: ICD-10-CM

## 2025-03-11 DIAGNOSIS — E87.1 HYPO-OSMOLALITY AND HYPONATREMIA: ICD-10-CM

## 2025-03-11 LAB
ALBUMIN SERPL ELPH-MCNC: 3.4 G/DL
ALP BLD-CCNC: 85 U/L
ALT SERPL-CCNC: 17 U/L
ANION GAP SERPL CALC-SCNC: 7 MMOL/L
AST SERPL-CCNC: 24 U/L
BASOPHILS # BLD AUTO: 0.04 K/UL
BASOPHILS NFR BLD AUTO: 0.6 %
BILIRUB SERPL-MCNC: 0.2 MG/DL
BUN SERPL-MCNC: 24 MG/DL
CALCIUM SERPL-MCNC: 8.8 MG/DL
CHLORIDE SERPL-SCNC: 98 MMOL/L
CO2 SERPL-SCNC: 27 MMOL/L
CREAT SERPL-MCNC: 0.8 MG/DL
EGFRCR SERPLBLD CKD-EPI 2021: 70 ML/MIN/1.73M2
EOSINOPHIL # BLD AUTO: 0.45 K/UL
EOSINOPHIL NFR BLD AUTO: 6.9 %
GLUCOSE SERPL-MCNC: 97 MG/DL
HBV CORE IGG+IGM SER QL: NONREACTIVE
HBV SURFACE AB SER QL: NONREACTIVE
HBV SURFACE AG SER QL: NONREACTIVE
HCT VFR BLD CALC: 36.8 %
HCV AB SER QL: NONREACTIVE
HCV S/CO RATIO: 0.08 S/CO
HGB BLD-MCNC: 12 G/DL
HIV1+2 AB SPEC QL IA.RAPID: NONREACTIVE
IMM GRANULOCYTES NFR BLD AUTO: 0.3 %
LYMPHOCYTES # BLD AUTO: 1.23 K/UL
LYMPHOCYTES NFR BLD AUTO: 19 %
MAN DIFF?: NORMAL
MCHC RBC-ENTMCNC: 27.8 PG
MCHC RBC-ENTMCNC: 32.6 G/DL
MCV RBC AUTO: 85.4 FL
MONOCYTES # BLD AUTO: 0.87 K/UL
MONOCYTES NFR BLD AUTO: 13.4 %
NEUTROPHILS # BLD AUTO: 3.88 K/UL
NEUTROPHILS NFR BLD AUTO: 59.8 %
PLATELET # BLD AUTO: 404 K/UL
PMV BLD AUTO: 0 /100 WBCS
POTASSIUM SERPL-SCNC: 4.7 MMOL/L
PROT SERPL-MCNC: 8.6 G/DL
RBC # BLD: 4.31 M/UL
RBC # FLD: 15 %
SODIUM SERPL-SCNC: 132 MMOL/L
WBC # FLD AUTO: 6.49 K/UL

## 2025-03-11 PROCEDURE — 99205 OFFICE O/P NEW HI 60 MIN: CPT | Mod: 25

## 2025-03-12 PROBLEM — C50.912: Status: ACTIVE | Noted: 2025-03-12

## 2025-03-12 PROBLEM — C77.3 MALIGNANT NEOPLASM METASTATIC TO LYMPH NODE OF AXILLA: Status: ACTIVE | Noted: 2025-03-12

## 2025-03-12 PROBLEM — F03.90 DEMENTIA WITHOUT BEHAVIORAL DISTURBANCE, PSYCHOTIC DISTURBANCE, MOOD DISTURBANCE, OR ANXIETY, UNSPECIFIED DEMENTIA SEVERITY, UNSPECIFIED DEMENTIA TYPE: Status: ACTIVE | Noted: 2025-03-12

## 2025-03-12 LAB
25(OH)D3 SERPL-MCNC: 53.2 NG/ML
TSH SERPL-ACNC: 2.46 UIU/ML

## 2025-03-21 ENCOUNTER — APPOINTMENT (OUTPATIENT)
Dept: ULTRASOUND IMAGING | Facility: CLINIC | Age: 89
End: 2025-03-21
Payer: MEDICARE

## 2025-03-21 ENCOUNTER — RESULT REVIEW (OUTPATIENT)
Age: 89
End: 2025-03-21

## 2025-03-21 PROCEDURE — 76642 ULTRASOUND BREAST LIMITED: CPT | Mod: RT

## 2025-03-26 ENCOUNTER — NON-APPOINTMENT (OUTPATIENT)
Age: 89
End: 2025-03-26

## 2025-03-26 ENCOUNTER — APPOINTMENT (OUTPATIENT)
Dept: RADIATION ONCOLOGY | Facility: CLINIC | Age: 89
End: 2025-03-26
Payer: MEDICARE

## 2025-03-26 ENCOUNTER — APPOINTMENT (OUTPATIENT)
Dept: BREAST CENTER | Facility: CLINIC | Age: 89
End: 2025-03-26
Payer: MEDICARE

## 2025-03-26 VITALS — WEIGHT: 172 LBS | BODY MASS INDEX: 34.68 KG/M2 | HEIGHT: 59 IN

## 2025-03-26 VITALS
SYSTOLIC BLOOD PRESSURE: 152 MMHG | BODY MASS INDEX: 34.9 KG/M2 | HEART RATE: 81 BPM | DIASTOLIC BLOOD PRESSURE: 77 MMHG | TEMPERATURE: 97.5 F | OXYGEN SATURATION: 98 % | WEIGHT: 172.8 LBS

## 2025-03-26 DIAGNOSIS — C77.0 MALIGNANT NEOPLASM OF UNSPECIFIED SITE OF LEFT FEMALE BREAST: ICD-10-CM

## 2025-03-26 DIAGNOSIS — F03.90 UNSPECIFIED DEMENTIA W/OUT BEHAVIORAL DISTURBANCE: ICD-10-CM

## 2025-03-26 DIAGNOSIS — C50.919 MALIGNANT NEOPLASM OF UNSPECIFIED SITE OF UNSPECIFIED FEMALE BREAST: ICD-10-CM

## 2025-03-26 DIAGNOSIS — Z78.9 OTHER SPECIFIED HEALTH STATUS: ICD-10-CM

## 2025-03-26 DIAGNOSIS — C50.912 MALIGNANT NEOPLASM OF UNSPECIFIED SITE OF LEFT FEMALE BREAST: ICD-10-CM

## 2025-03-26 PROCEDURE — G2211 COMPLEX E/M VISIT ADD ON: CPT

## 2025-03-26 PROCEDURE — 99205 OFFICE O/P NEW HI 60 MIN: CPT

## 2025-03-26 PROCEDURE — 99215 OFFICE O/P EST HI 40 MIN: CPT

## 2025-04-02 ENCOUNTER — NON-APPOINTMENT (OUTPATIENT)
Age: 89
End: 2025-04-02

## 2025-04-08 ENCOUNTER — APPOINTMENT (OUTPATIENT)
Dept: HEMATOLOGY ONCOLOGY | Facility: CLINIC | Age: 89
End: 2025-04-08
Payer: MEDICARE

## 2025-04-08 VITALS
OXYGEN SATURATION: 95 % | DIASTOLIC BLOOD PRESSURE: 67 MMHG | SYSTOLIC BLOOD PRESSURE: 129 MMHG | HEIGHT: 56 IN | TEMPERATURE: 97.9 F | RESPIRATION RATE: 18 BRPM | HEART RATE: 89 BPM

## 2025-04-08 DIAGNOSIS — C50.912 MALIGNANT NEOPLASM OF UNSPECIFIED SITE OF LEFT FEMALE BREAST: ICD-10-CM

## 2025-04-08 DIAGNOSIS — E87.1 HYPO-OSMOLALITY AND HYPONATREMIA: ICD-10-CM

## 2025-04-08 DIAGNOSIS — C50.919 MALIGNANT NEOPLASM OF UNSPECIFIED SITE OF UNSPECIFIED FEMALE BREAST: ICD-10-CM

## 2025-04-08 DIAGNOSIS — Z17.421 MALIGNANT NEOPLASM OF UNSPECIFIED SITE OF UNSPECIFIED FEMALE BREAST: ICD-10-CM

## 2025-04-08 DIAGNOSIS — F03.90 UNSPECIFIED DEMENTIA W/OUT BEHAVIORAL DISTURBANCE: ICD-10-CM

## 2025-04-08 DIAGNOSIS — C77.3 SECONDARY AND UNSPECIFIED MALIGNANT NEOPLASM OF AXILLA AND UPPER LIMB LYMPH NODES: ICD-10-CM

## 2025-04-08 DIAGNOSIS — R92.8 OTHER ABNORMAL AND INCONCLUSIVE FINDINGS ON DIAGNOSTIC IMAGING OF BREAST: ICD-10-CM

## 2025-04-08 DIAGNOSIS — C77.0 MALIGNANT NEOPLASM OF UNSPECIFIED SITE OF LEFT FEMALE BREAST: ICD-10-CM

## 2025-04-08 PROCEDURE — 99214 OFFICE O/P EST MOD 30 MIN: CPT

## 2025-04-08 PROCEDURE — G2211 COMPLEX E/M VISIT ADD ON: CPT

## 2025-04-08 RX ORDER — ONDANSETRON 8 MG/1
8 TABLET, ORALLY DISINTEGRATING ORAL EVERY 8 HOURS
Qty: 20 | Refills: 6 | Status: ACTIVE | COMMUNITY
Start: 2025-04-08 | End: 1900-01-01

## 2025-04-09 ENCOUNTER — NON-APPOINTMENT (OUTPATIENT)
Age: 89
End: 2025-04-09

## 2025-04-14 ENCOUNTER — NON-APPOINTMENT (OUTPATIENT)
Age: 89
End: 2025-04-14

## 2025-04-15 ENCOUNTER — APPOINTMENT (OUTPATIENT)
Dept: INFUSION THERAPY | Facility: CLINIC | Age: 89
End: 2025-04-15

## 2025-04-15 ENCOUNTER — OUTPATIENT (OUTPATIENT)
Dept: OUTPATIENT SERVICES | Facility: HOSPITAL | Age: 89
LOS: 1 days | End: 2025-04-15
Payer: MEDICARE

## 2025-04-15 VITALS
SYSTOLIC BLOOD PRESSURE: 129 MMHG | HEART RATE: 82 BPM | OXYGEN SATURATION: 95 % | DIASTOLIC BLOOD PRESSURE: 76 MMHG | TEMPERATURE: 98 F | RESPIRATION RATE: 18 BRPM

## 2025-04-15 VITALS
DIASTOLIC BLOOD PRESSURE: 70 MMHG | WEIGHT: 169.09 LBS | HEART RATE: 89 BPM | TEMPERATURE: 98 F | HEIGHT: 57 IN | SYSTOLIC BLOOD PRESSURE: 155 MMHG | OXYGEN SATURATION: 95 % | RESPIRATION RATE: 18 BRPM

## 2025-04-15 LAB
ALBUMIN SERPL ELPH-MCNC: 2.7 G/DL — LOW (ref 3.3–5)
ALP SERPL-CCNC: 71 U/L — SIGNIFICANT CHANGE UP (ref 40–120)
ALT FLD-CCNC: 15 U/L — SIGNIFICANT CHANGE UP (ref 10–45)
ANION GAP SERPL CALC-SCNC: 1 MMOL/L — LOW (ref 5–17)
AST SERPL-CCNC: 33 U/L — SIGNIFICANT CHANGE UP (ref 10–40)
BILIRUB SERPL-MCNC: 0.6 MG/DL — SIGNIFICANT CHANGE UP (ref 0.2–1.2)
BUN SERPL-MCNC: 24 MG/DL — HIGH (ref 7–23)
CALCIUM SERPL-MCNC: 8.8 MG/DL — SIGNIFICANT CHANGE UP (ref 8.4–10.5)
CHLORIDE SERPL-SCNC: 103 MMOL/L — SIGNIFICANT CHANGE UP (ref 96–108)
CO2 SERPL-SCNC: 29 MMOL/L — SIGNIFICANT CHANGE UP (ref 22–31)
CREAT SERPL-MCNC: 0.7 MG/DL — SIGNIFICANT CHANGE UP (ref 0.5–1.3)
EGFR: 82 ML/MIN/1.73M2 — SIGNIFICANT CHANGE UP
EGFR: 82 ML/MIN/1.73M2 — SIGNIFICANT CHANGE UP
GLUCOSE SERPL-MCNC: 124 MG/DL — HIGH (ref 70–99)
HCT VFR BLD CALC: 34.1 % — LOW (ref 34.5–45)
HGB BLD-MCNC: 11.2 G/DL — LOW (ref 11.5–15.5)
LYMPHOCYTES # BLD AUTO: 2 K/UL — SIGNIFICANT CHANGE UP (ref 1–3.3)
LYMPHOCYTES # BLD AUTO: 28.2 % — SIGNIFICANT CHANGE UP (ref 13–44)
MCHC RBC-ENTMCNC: 28 PG — SIGNIFICANT CHANGE UP (ref 27–34)
MCHC RBC-ENTMCNC: 32.8 G/DL — SIGNIFICANT CHANGE UP (ref 32–36)
MCV RBC AUTO: 85.3 FL — SIGNIFICANT CHANGE UP (ref 80–100)
NEUTROPHILS # BLD AUTO: 3.3 K/UL — SIGNIFICANT CHANGE UP (ref 1.8–7.4)
NEUTROPHILS NFR BLD AUTO: 47.3 % — SIGNIFICANT CHANGE UP (ref 43–77)
PLATELET # BLD AUTO: 402 K/UL — HIGH (ref 150–400)
POTASSIUM SERPL-MCNC: 4.8 MMOL/L — SIGNIFICANT CHANGE UP (ref 3.5–5.3)
POTASSIUM SERPL-SCNC: 4.8 MMOL/L — SIGNIFICANT CHANGE UP (ref 3.5–5.3)
PROT SERPL-MCNC: 7.9 G/DL — SIGNIFICANT CHANGE UP (ref 6–8.3)
RBC # BLD: 4 M/UL — SIGNIFICANT CHANGE UP (ref 3.8–5.2)
RBC # FLD: 15.5 % — HIGH (ref 10.3–14.5)
SODIUM SERPL-SCNC: 133 MMOL/L — LOW (ref 135–145)
WBC # BLD: 7 K/UL — SIGNIFICANT CHANGE UP (ref 3.8–10.5)
WBC # FLD AUTO: 7 K/UL — SIGNIFICANT CHANGE UP (ref 3.8–10.5)

## 2025-04-15 PROCEDURE — 96375 TX/PRO/DX INJ NEW DRUG ADDON: CPT

## 2025-04-15 PROCEDURE — 36415 COLL VENOUS BLD VENIPUNCTURE: CPT

## 2025-04-15 PROCEDURE — 80053 COMPREHEN METABOLIC PANEL: CPT

## 2025-04-15 PROCEDURE — 96411 CHEMO IV PUSH ADDL DRUG: CPT

## 2025-04-15 PROCEDURE — 96413 CHEMO IV INFUSION 1 HR: CPT

## 2025-04-15 PROCEDURE — 85025 COMPLETE CBC W/AUTO DIFF WBC: CPT

## 2025-04-15 RX ORDER — CYCLOPHOSPHAMIDE INJECTION, SOLUTION 200 MG/ML
850 INJECTION INTRAVENOUS ONCE
Refills: 0 | Status: COMPLETED | OUTPATIENT
Start: 2025-04-15 | End: 2025-04-15

## 2025-04-15 RX ORDER — DEXAMETHASONE 0.5 MG/1
10 TABLET ORAL ONCE
Refills: 0 | Status: COMPLETED | OUTPATIENT
Start: 2025-04-15 | End: 2025-04-15

## 2025-04-15 RX ORDER — PALONOSETRON HYDROCHLORIDE 0.05 MG/ML
0.25 INJECTION, SOLUTION INTRAVENOUS ONCE
Refills: 0 | Status: COMPLETED | OUTPATIENT
Start: 2025-04-15 | End: 2025-04-15

## 2025-04-15 RX ORDER — FLUOROURACIL 50 MG/ML
850 INJECTION, SOLUTION INTRAVENOUS ONCE
Refills: 0 | Status: COMPLETED | OUTPATIENT
Start: 2025-04-15 | End: 2025-04-15

## 2025-04-15 RX ORDER — METHOTREXATE 25 MG/ML
68 INJECTION, SOLUTION INTRA-ARTERIAL; INTRAMUSCULAR; INTRATHECAL; INTRAVENOUS ONCE
Refills: 0 | Status: COMPLETED | OUTPATIENT
Start: 2025-04-15 | End: 2025-04-15

## 2025-04-15 RX ADMIN — Medication 500 MILLILITER(S): at 16:10

## 2025-04-15 RX ADMIN — CYCLOPHOSPHAMIDE INJECTION, SOLUTION 850 MILLIGRAM(S): 200 INJECTION INTRAVENOUS at 16:10

## 2025-04-15 RX ADMIN — DEXAMETHASONE 10 MILLIGRAM(S): 0.5 TABLET ORAL at 15:20

## 2025-04-15 RX ADMIN — CYCLOPHOSPHAMIDE INJECTION, SOLUTION 850 MILLIGRAM(S): 200 INJECTION INTRAVENOUS at 15:40

## 2025-04-15 RX ADMIN — DEXAMETHASONE 204 MILLIGRAM(S): 0.5 TABLET ORAL at 15:05

## 2025-04-15 RX ADMIN — FLUOROURACIL 204 MILLIGRAM(S): 50 INJECTION, SOLUTION INTRAVENOUS at 15:35

## 2025-04-15 RX ADMIN — METHOTREXATE 32.64 MILLIGRAM(S): 25 INJECTION, SOLUTION INTRA-ARTERIAL; INTRAMUSCULAR; INTRATHECAL; INTRAVENOUS at 15:30

## 2025-04-15 RX ADMIN — Medication 500 MILLILITER(S): at 16:53

## 2025-04-15 RX ADMIN — PALONOSETRON HYDROCHLORIDE 0.25 MILLIGRAM(S): 0.05 INJECTION, SOLUTION INTRAVENOUS at 15:03

## 2025-04-15 NOTE — DISCHARGE INSTRUCTIONS: CHEMOTHERAPY - DC SYMPTOM 2
Episodes of uncontrolled nausea or vomiting not relieved by anti-nausea medication Ketoconazole Counseling:   Patient counseled regarding improving absorption with orange juice.  Adverse effects include but are not limited to breast enlargement, headache, diarrhea, nausea, upset stomach, liver function test abnormalities, taste disturbance, and stomach pain.  There is a rare possibility of liver failure that can occur when taking ketoconazole. The patient understands that monitoring of LFTs may be required, especially at baseline. The patient verbalized understanding of the proper use and possible adverse effects of ketoconazole.  All of the patient's questions and concerns were addressed.

## 2025-04-16 ENCOUNTER — NON-APPOINTMENT (OUTPATIENT)
Age: 89
End: 2025-04-16

## 2025-05-06 ENCOUNTER — APPOINTMENT (OUTPATIENT)
Dept: HEMATOLOGY ONCOLOGY | Facility: CLINIC | Age: 89
End: 2025-05-06
Payer: MEDICARE

## 2025-05-06 ENCOUNTER — LABORATORY RESULT (OUTPATIENT)
Age: 89
End: 2025-05-06

## 2025-05-06 ENCOUNTER — APPOINTMENT (OUTPATIENT)
Dept: INFUSION THERAPY | Facility: CLINIC | Age: 89
End: 2025-05-06

## 2025-05-06 VITALS
HEIGHT: 56 IN | BODY MASS INDEX: 36.44 KG/M2 | DIASTOLIC BLOOD PRESSURE: 77 MMHG | HEART RATE: 92 BPM | WEIGHT: 162 LBS | TEMPERATURE: 98 F | RESPIRATION RATE: 18 BRPM | SYSTOLIC BLOOD PRESSURE: 149 MMHG | OXYGEN SATURATION: 95 %

## 2025-05-06 DIAGNOSIS — I10 ESSENTIAL (PRIMARY) HYPERTENSION: ICD-10-CM

## 2025-05-06 DIAGNOSIS — Z23 ENCOUNTER FOR IMMUNIZATION: ICD-10-CM

## 2025-05-06 LAB
ALBUMIN SERPL ELPH-MCNC: 2.8 G/DL
ALP BLD-CCNC: 70 U/L
ALT SERPL-CCNC: 13 U/L
ANION GAP SERPL CALC-SCNC: 1 MMOL/L
AST SERPL-CCNC: 37 U/L
BASOPHILS # BLD AUTO: 0.1 K/UL
BASOPHILS NFR BLD AUTO: 1 %
BILIRUB SERPL-MCNC: 0.5 MG/DL
BUN SERPL-MCNC: 18 MG/DL
CALCIUM SERPL-MCNC: 9.7 MG/DL
CHLORIDE SERPL-SCNC: 98 MMOL/L
CO2 SERPL-SCNC: 29 MMOL/L
CREAT SERPL-MCNC: 0.7 MG/DL
EGFRCR SERPLBLD CKD-EPI 2021: 82 ML/MIN/1.73M2
EOSINOPHIL # BLD AUTO: 0.7 K/UL
EOSINOPHIL NFR BLD AUTO: 10 %
GLUCOSE SERPL-MCNC: 124 MG/DL
HCT VFR BLD CALC: 35.8 %
HGB BLD-MCNC: 11.7 G/DL
LYMPHOCYTES # BLD AUTO: 1.32 K/UL
LYMPHOCYTES NFR BLD AUTO: 20 %
MAN DIFF?: YES
MCHC RBC-ENTMCNC: 27.3 PG
MCHC RBC-ENTMCNC: 32.7 G/DL
MCV RBC AUTO: 83.4 FL
MONOCYTES # BLD AUTO: 1.2 K/UL
MONOCYTES NFR BLD AUTO: 18 %
NEUTROPHILS # BLD AUTO: 3.37 K/UL
NEUTROPHILS NFR BLD AUTO: 51 %
PLATELET # BLD AUTO: 611 K/UL
POTASSIUM SERPL-SCNC: 5.3 MMOL/L
PROT SERPL-MCNC: 8.1 G/DL
RBC # BLD: 4.29 M/UL
RBC # FLD: 15.2 %
SODIUM SERPL-SCNC: 128 MMOL/L
WBC # FLD AUTO: 6.6 K/UL

## 2025-05-06 PROCEDURE — 99213 OFFICE O/P EST LOW 20 MIN: CPT | Mod: 25

## 2025-05-13 ENCOUNTER — APPOINTMENT (OUTPATIENT)
Dept: INFUSION THERAPY | Facility: CLINIC | Age: 89
End: 2025-05-13

## 2025-05-13 ENCOUNTER — OUTPATIENT (OUTPATIENT)
Dept: OUTPATIENT SERVICES | Facility: HOSPITAL | Age: 89
LOS: 1 days | End: 2025-05-13
Payer: MEDICARE

## 2025-05-13 ENCOUNTER — APPOINTMENT (OUTPATIENT)
Dept: HEMATOLOGY ONCOLOGY | Facility: CLINIC | Age: 89
End: 2025-05-13
Payer: MEDICARE

## 2025-05-13 VITALS
BODY MASS INDEX: 36.44 KG/M2 | HEART RATE: 93 BPM | RESPIRATION RATE: 18 BRPM | SYSTOLIC BLOOD PRESSURE: 135 MMHG | HEIGHT: 56 IN | DIASTOLIC BLOOD PRESSURE: 71 MMHG | WEIGHT: 162 LBS | OXYGEN SATURATION: 94 % | TEMPERATURE: 98.2 F

## 2025-05-13 VITALS
OXYGEN SATURATION: 95 % | WEIGHT: 162.04 LBS | DIASTOLIC BLOOD PRESSURE: 70 MMHG | TEMPERATURE: 99 F | HEART RATE: 83 BPM | SYSTOLIC BLOOD PRESSURE: 153 MMHG | RESPIRATION RATE: 18 BRPM | HEIGHT: 55 IN

## 2025-05-13 DIAGNOSIS — C50.919 MALIGNANT NEOPLASM OF UNSPECIFIED SITE OF UNSPECIFIED FEMALE BREAST: ICD-10-CM

## 2025-05-13 DIAGNOSIS — C50.912 MALIGNANT NEOPLASM OF UNSPECIFIED SITE OF LEFT FEMALE BREAST: ICD-10-CM

## 2025-05-13 DIAGNOSIS — Z17.421 MALIGNANT NEOPLASM OF UNSPECIFIED SITE OF UNSPECIFIED FEMALE BREAST: ICD-10-CM

## 2025-05-13 DIAGNOSIS — F03.90 UNSPECIFIED DEMENTIA W/OUT BEHAVIORAL DISTURBANCE: ICD-10-CM

## 2025-05-13 DIAGNOSIS — C77.0 MALIGNANT NEOPLASM OF UNSPECIFIED SITE OF LEFT FEMALE BREAST: ICD-10-CM

## 2025-05-13 DIAGNOSIS — E87.1 HYPO-OSMOLALITY AND HYPONATREMIA: ICD-10-CM

## 2025-05-13 DIAGNOSIS — C77.3 SECONDARY AND UNSPECIFIED MALIGNANT NEOPLASM OF AXILLA AND UPPER LIMB LYMPH NODES: ICD-10-CM

## 2025-05-13 LAB
ALBUMIN SERPL ELPH-MCNC: 2.7 G/DL
ALP BLD-CCNC: 51 U/L
ALT SERPL-CCNC: 16 U/L
ANION GAP SERPL CALC-SCNC: 0 MMOL/L
AST SERPL-CCNC: 25 U/L
BILIRUB SERPL-MCNC: 0.6 MG/DL
BUN SERPL-MCNC: 18 MG/DL
CALCIUM SERPL-MCNC: 8.8 MG/DL
CHLORIDE SERPL-SCNC: 102 MMOL/L
CO2 SERPL-SCNC: 29 MMOL/L
CREAT SERPL-MCNC: 0.9 MG/DL
EGFRCR SERPLBLD CKD-EPI 2021: 61 ML/MIN/1.73M2
GLUCOSE SERPL-MCNC: 122 MG/DL
HCT VFR BLD CALC: 35.5 %
HGB BLD-MCNC: 11.7 G/DL
LYMPHOCYTES # BLD AUTO: 1.1 K/UL
LYMPHOCYTES NFR BLD AUTO: 16.4 %
MAN DIFF?: NO
MCHC RBC-ENTMCNC: 27.6 PG
MCHC RBC-ENTMCNC: 33 G/DL
MCV RBC AUTO: 83.7 FL
NEUTROPHILS # BLD AUTO: 4 K/UL
NEUTROPHILS NFR BLD AUTO: 60.9 %
PLATELET # BLD AUTO: 408 K/UL
POTASSIUM SERPL-SCNC: 5 MMOL/L
PROT SERPL-MCNC: 7.9 G/DL
RBC # BLD: 4.24 M/UL
RBC # FLD: 15.2 %
SODIUM SERPL-SCNC: 131 MMOL/L
WBC # FLD AUTO: 6.6 K/UL

## 2025-05-13 PROCEDURE — 96413 CHEMO IV INFUSION 1 HR: CPT

## 2025-05-13 PROCEDURE — 96375 TX/PRO/DX INJ NEW DRUG ADDON: CPT

## 2025-05-13 PROCEDURE — 96411 CHEMO IV PUSH ADDL DRUG: CPT

## 2025-05-13 PROCEDURE — 99213 OFFICE O/P EST LOW 20 MIN: CPT | Mod: 25

## 2025-05-13 RX ORDER — PALONOSETRON HYDROCHLORIDE 0.05 MG/ML
0.25 INJECTION, SOLUTION INTRAVENOUS ONCE
Refills: 0 | Status: COMPLETED | OUTPATIENT
Start: 2025-05-13 | End: 2025-05-13

## 2025-05-13 RX ORDER — CYCLOPHOSPHAMIDE INJECTION, SOLUTION 200 MG/ML
850 INJECTION INTRAVENOUS ONCE
Refills: 0 | Status: COMPLETED | OUTPATIENT
Start: 2025-05-13 | End: 2025-05-13

## 2025-05-13 RX ORDER — METHOTREXATE 25 MG/ML
68 INJECTION, SOLUTION INTRA-ARTERIAL; INTRAMUSCULAR; INTRATHECAL; INTRAVENOUS ONCE
Refills: 0 | Status: COMPLETED | OUTPATIENT
Start: 2025-05-13 | End: 2025-05-13

## 2025-05-13 RX ORDER — FLUOROURACIL 50 MG/ML
850 INJECTION, SOLUTION INTRAVENOUS ONCE
Refills: 0 | Status: COMPLETED | OUTPATIENT
Start: 2025-05-13 | End: 2025-05-13

## 2025-05-13 RX ORDER — DEXAMETHASONE 0.5 MG/1
10 TABLET ORAL ONCE
Refills: 0 | Status: COMPLETED | OUTPATIENT
Start: 2025-05-13 | End: 2025-05-13

## 2025-05-13 RX ADMIN — DEXAMETHASONE 10 MILLIGRAM(S): 0.5 TABLET ORAL at 16:34

## 2025-05-13 RX ADMIN — METHOTREXATE 32.64 MILLIGRAM(S): 25 INJECTION, SOLUTION INTRA-ARTERIAL; INTRAMUSCULAR; INTRATHECAL; INTRAVENOUS at 16:35

## 2025-05-13 RX ADMIN — Medication 500 MILLILITER(S): at 17:27

## 2025-05-13 RX ADMIN — PALONOSETRON HYDROCHLORIDE 0.25 MILLIGRAM(S): 0.05 INJECTION, SOLUTION INTRAVENOUS at 16:36

## 2025-05-13 RX ADMIN — DEXAMETHASONE 204 MILLIGRAM(S): 0.5 TABLET ORAL at 16:19

## 2025-05-13 RX ADMIN — FLUOROURACIL 204 MILLIGRAM(S): 50 INJECTION, SOLUTION INTRAVENOUS at 16:36

## 2025-05-13 RX ADMIN — CYCLOPHOSPHAMIDE INJECTION, SOLUTION 850 MILLIGRAM(S): 200 INJECTION INTRAVENOUS at 16:37

## 2025-05-13 RX ADMIN — CYCLOPHOSPHAMIDE INJECTION, SOLUTION 850 MILLIGRAM(S): 200 INJECTION INTRAVENOUS at 17:25

## 2025-06-03 ENCOUNTER — OUTPATIENT (OUTPATIENT)
Dept: OUTPATIENT SERVICES | Facility: HOSPITAL | Age: 89
LOS: 1 days | End: 2025-06-03
Payer: MEDICARE

## 2025-06-03 ENCOUNTER — APPOINTMENT (OUTPATIENT)
Dept: INFUSION THERAPY | Facility: CLINIC | Age: 89
End: 2025-06-03

## 2025-06-03 ENCOUNTER — LABORATORY RESULT (OUTPATIENT)
Age: 89
End: 2025-06-03

## 2025-06-03 ENCOUNTER — APPOINTMENT (OUTPATIENT)
Dept: HEMATOLOGY ONCOLOGY | Facility: CLINIC | Age: 89
End: 2025-06-03
Payer: MEDICARE

## 2025-06-03 VITALS
SYSTOLIC BLOOD PRESSURE: 102 MMHG | OXYGEN SATURATION: 96 % | WEIGHT: 157 LBS | BODY MASS INDEX: 35.32 KG/M2 | DIASTOLIC BLOOD PRESSURE: 46 MMHG | HEIGHT: 56 IN | HEART RATE: 92 BPM | RESPIRATION RATE: 18 BRPM

## 2025-06-03 VITALS
DIASTOLIC BLOOD PRESSURE: 74 MMHG | WEIGHT: 156.97 LBS | OXYGEN SATURATION: 94 % | TEMPERATURE: 99 F | SYSTOLIC BLOOD PRESSURE: 139 MMHG | RESPIRATION RATE: 17 BRPM | HEART RATE: 86 BPM | HEIGHT: 56 IN

## 2025-06-03 VITALS
RESPIRATION RATE: 19 BRPM | OXYGEN SATURATION: 95 % | DIASTOLIC BLOOD PRESSURE: 77 MMHG | HEART RATE: 82 BPM | SYSTOLIC BLOOD PRESSURE: 118 MMHG | TEMPERATURE: 99 F

## 2025-06-03 DIAGNOSIS — C50.919 MALIGNANT NEOPLASM OF UNSPECIFIED SITE OF UNSPECIFIED FEMALE BREAST: ICD-10-CM

## 2025-06-03 DIAGNOSIS — C77.3 SECONDARY AND UNSPECIFIED MALIGNANT NEOPLASM OF AXILLA AND UPPER LIMB LYMPH NODES: ICD-10-CM

## 2025-06-03 DIAGNOSIS — C50.912 MALIGNANT NEOPLASM OF UNSPECIFIED SITE OF LEFT FEMALE BREAST: ICD-10-CM

## 2025-06-03 DIAGNOSIS — C77.0 MALIGNANT NEOPLASM OF UNSPECIFIED SITE OF LEFT FEMALE BREAST: ICD-10-CM

## 2025-06-03 DIAGNOSIS — F03.90 UNSPECIFIED DEMENTIA W/OUT BEHAVIORAL DISTURBANCE: ICD-10-CM

## 2025-06-03 DIAGNOSIS — Z17.421 MALIGNANT NEOPLASM OF UNSPECIFIED SITE OF UNSPECIFIED FEMALE BREAST: ICD-10-CM

## 2025-06-03 LAB
ALBUMIN SERPL ELPH-MCNC: 3 G/DL
ALP BLD-CCNC: 66 U/L
ALT SERPL-CCNC: 12 U/L
ANION GAP SERPL CALC-SCNC: 13 MMOL/L
AST SERPL-CCNC: 27 U/L
BASOPHILS # BLD AUTO: 0.1 K/UL
BASOPHILS NFR BLD AUTO: 1 %
BILIRUB SERPL-MCNC: 0.9 MG/DL
BUN SERPL-MCNC: 17 MG/DL
CALCIUM SERPL-MCNC: 8.9 MG/DL
CHLORIDE SERPL-SCNC: 94 MMOL/L
CO2 SERPL-SCNC: 28 MMOL/L
CREAT SERPL-MCNC: 0.9 MG/DL
EGFRCR SERPLBLD CKD-EPI 2021: 61 ML/MIN/1.73M2
EOSINOPHIL # BLD AUTO: 0.5 K/UL
EOSINOPHIL NFR BLD AUTO: 8 %
GLUCOSE SERPL-MCNC: 138 MG/DL
HCT VFR BLD CALC: 37.7 %
HGB BLD-MCNC: 12.1 G/DL
LYMPHOCYTES # BLD AUTO: 0.8 K/UL
LYMPHOCYTES NFR BLD AUTO: 14 %
MAN DIFF?: YES
MCHC RBC-ENTMCNC: 27.6 PG
MCHC RBC-ENTMCNC: 32.1 G/DL
MCV RBC AUTO: 86.1 FL
MONOCYTES # BLD AUTO: 0.3 K/UL
MONOCYTES NFR BLD AUTO: 6 %
NEUTROPHILS # BLD AUTO: 3.5 K/UL
NEUTROPHILS NFR BLD AUTO: 60 %
PLATELET # BLD AUTO: 450 K/UL
POTASSIUM SERPL-SCNC: 3.1 MMOL/L
PROT SERPL-MCNC: 8.6 G/DL
RBC # BLD: 4.38 M/UL
RBC # FLD: 16.9 %
SODIUM SERPL-SCNC: 135 MMOL/L
WBC # FLD AUTO: 5.8 K/UL

## 2025-06-03 PROCEDURE — 96413 CHEMO IV INFUSION 1 HR: CPT

## 2025-06-03 PROCEDURE — 96375 TX/PRO/DX INJ NEW DRUG ADDON: CPT

## 2025-06-03 PROCEDURE — 99213 OFFICE O/P EST LOW 20 MIN: CPT | Mod: 25

## 2025-06-03 PROCEDURE — 96411 CHEMO IV PUSH ADDL DRUG: CPT

## 2025-06-03 RX ORDER — DEXAMETHASONE 0.5 MG/1
10 TABLET ORAL ONCE
Refills: 0 | Status: COMPLETED | OUTPATIENT
Start: 2025-06-03 | End: 2025-06-03

## 2025-06-03 RX ORDER — CYCLOPHOSPHAMIDE INJECTION, SOLUTION 200 MG/ML
850 INJECTION INTRAVENOUS ONCE
Refills: 0 | Status: COMPLETED | OUTPATIENT
Start: 2025-06-03 | End: 2025-06-03

## 2025-06-03 RX ORDER — PALONOSETRON HYDROCHLORIDE 0.05 MG/ML
0.25 INJECTION, SOLUTION INTRAVENOUS ONCE
Refills: 0 | Status: COMPLETED | OUTPATIENT
Start: 2025-06-03 | End: 2025-06-03

## 2025-06-03 RX ORDER — METHOTREXATE 25 MG/ML
68 INJECTION, SOLUTION INTRA-ARTERIAL; INTRAMUSCULAR; INTRATHECAL; INTRAVENOUS ONCE
Refills: 0 | Status: COMPLETED | OUTPATIENT
Start: 2025-06-03 | End: 2025-06-03

## 2025-06-03 RX ORDER — FLUOROURACIL 50 MG/ML
850 INJECTION, SOLUTION INTRAVENOUS ONCE
Refills: 0 | Status: COMPLETED | OUTPATIENT
Start: 2025-06-03 | End: 2025-06-03

## 2025-06-03 RX ORDER — UBIDECARENONE 100 MG
100 CAPSULE ORAL TWICE DAILY
Refills: 0 | Status: ACTIVE | COMMUNITY
Start: 2025-06-03

## 2025-06-03 RX ADMIN — Medication 500 MILLILITER(S): at 18:05

## 2025-06-03 RX ADMIN — DEXAMETHASONE 10 MILLIGRAM(S): 0.5 TABLET ORAL at 16:39

## 2025-06-03 RX ADMIN — CYCLOPHOSPHAMIDE INJECTION, SOLUTION 850 MILLIGRAM(S): 200 INJECTION INTRAVENOUS at 18:02

## 2025-06-03 RX ADMIN — CYCLOPHOSPHAMIDE INJECTION, SOLUTION 850 MILLIGRAM(S): 200 INJECTION INTRAVENOUS at 17:32

## 2025-06-03 RX ADMIN — PALONOSETRON HYDROCHLORIDE 0.25 MILLIGRAM(S): 0.05 INJECTION, SOLUTION INTRAVENOUS at 16:25

## 2025-06-03 RX ADMIN — Medication 500 MILLILITER(S): at 19:05

## 2025-06-03 RX ADMIN — FLUOROURACIL 204 MILLIGRAM(S): 50 INJECTION, SOLUTION INTRAVENOUS at 17:26

## 2025-06-03 RX ADMIN — METHOTREXATE 32.64 MILLIGRAM(S): 25 INJECTION, SOLUTION INTRA-ARTERIAL; INTRAMUSCULAR; INTRATHECAL; INTRAVENOUS at 17:18

## 2025-06-03 RX ADMIN — DEXAMETHASONE 204 MILLIGRAM(S): 0.5 TABLET ORAL at 16:24

## 2025-06-24 ENCOUNTER — NON-APPOINTMENT (OUTPATIENT)
Age: 89
End: 2025-06-24

## 2025-06-24 ENCOUNTER — APPOINTMENT (OUTPATIENT)
Dept: HEMATOLOGY ONCOLOGY | Facility: CLINIC | Age: 89
End: 2025-06-24

## 2025-06-25 ENCOUNTER — APPOINTMENT (OUTPATIENT)
Dept: BREAST CENTER | Facility: CLINIC | Age: 89
End: 2025-06-25

## 2025-06-30 ENCOUNTER — NON-APPOINTMENT (OUTPATIENT)
Age: 89
End: 2025-06-30

## 2025-07-01 ENCOUNTER — NON-APPOINTMENT (OUTPATIENT)
Age: 89
End: 2025-07-01

## 2025-07-01 ENCOUNTER — APPOINTMENT (OUTPATIENT)
Dept: INFUSION THERAPY | Facility: CLINIC | Age: 89
End: 2025-07-01

## 2025-07-01 ENCOUNTER — APPOINTMENT (OUTPATIENT)
Dept: HEMATOLOGY ONCOLOGY | Facility: CLINIC | Age: 89
End: 2025-07-01

## 2025-07-02 ENCOUNTER — NON-APPOINTMENT (OUTPATIENT)
Age: 89
End: 2025-07-02

## 2025-07-03 ENCOUNTER — NON-APPOINTMENT (OUTPATIENT)
Age: 89
End: 2025-07-03

## 2025-07-05 ENCOUNTER — INPATIENT (INPATIENT)
Facility: HOSPITAL | Age: 89
LOS: 10 days | Discharge: NURSING, REHAB OR LONG-TERM CARE | End: 2025-07-16
Attending: HOSPITALIST | Admitting: INTERNAL MEDICINE
Payer: MEDICARE

## 2025-07-05 VITALS
HEART RATE: 96 BPM | TEMPERATURE: 98 F | OXYGEN SATURATION: 98 % | HEIGHT: 56 IN | RESPIRATION RATE: 20 BRPM | DIASTOLIC BLOOD PRESSURE: 61 MMHG | SYSTOLIC BLOOD PRESSURE: 90 MMHG

## 2025-07-05 DIAGNOSIS — E87.1 HYPO-OSMOLALITY AND HYPONATREMIA: ICD-10-CM

## 2025-07-05 DIAGNOSIS — L98.429 NON-PRESSURE CHRONIC ULCER OF BACK WITH UNSPECIFIED SEVERITY: ICD-10-CM

## 2025-07-05 DIAGNOSIS — E78.5 HYPERLIPIDEMIA, UNSPECIFIED: ICD-10-CM

## 2025-07-05 DIAGNOSIS — N17.9 ACUTE KIDNEY FAILURE, UNSPECIFIED: ICD-10-CM

## 2025-07-05 DIAGNOSIS — I10 ESSENTIAL (PRIMARY) HYPERTENSION: ICD-10-CM

## 2025-07-05 DIAGNOSIS — F03.90 UNSPECIFIED DEMENTIA, UNSPECIFIED SEVERITY, WITHOUT BEHAVIORAL DISTURBANCE, PSYCHOTIC DISTURBANCE, MOOD DISTURBANCE, AND ANXIETY: ICD-10-CM

## 2025-07-05 DIAGNOSIS — E87.0 HYPEROSMOLALITY AND HYPERNATREMIA: ICD-10-CM

## 2025-07-05 DIAGNOSIS — Z86.011 PERSONAL HISTORY OF BENIGN NEOPLASM OF THE BRAIN: ICD-10-CM

## 2025-07-05 DIAGNOSIS — C50.919 MALIGNANT NEOPLASM OF UNSPECIFIED SITE OF UNSPECIFIED FEMALE BREAST: ICD-10-CM

## 2025-07-05 DIAGNOSIS — N18.30 CHRONIC KIDNEY DISEASE, STAGE 3 UNSPECIFIED: ICD-10-CM

## 2025-07-05 DIAGNOSIS — Z29.9 ENCOUNTER FOR PROPHYLACTIC MEASURES, UNSPECIFIED: ICD-10-CM

## 2025-07-05 LAB
ADD ON TEST-SPECIMEN IN LAB: SIGNIFICANT CHANGE UP
ALBUMIN SERPL ELPH-MCNC: 2.6 G/DL — LOW (ref 3.3–5)
ALBUMIN SERPL ELPH-MCNC: 2.7 G/DL — LOW (ref 3.3–5)
ALP SERPL-CCNC: 65 U/L — SIGNIFICANT CHANGE UP (ref 40–120)
ALP SERPL-CCNC: SIGNIFICANT CHANGE UP (ref 40–120)
ALT FLD-CCNC: 15 U/L — SIGNIFICANT CHANGE UP (ref 10–45)
ALT FLD-CCNC: SIGNIFICANT CHANGE UP (ref 10–45)
ANION GAP SERPL CALC-SCNC: 10 MMOL/L — SIGNIFICANT CHANGE UP (ref 5–17)
ANION GAP SERPL CALC-SCNC: 10 MMOL/L — SIGNIFICANT CHANGE UP (ref 5–17)
ANION GAP SERPL CALC-SCNC: 11 MMOL/L — SIGNIFICANT CHANGE UP (ref 5–17)
ANION GAP SERPL CALC-SCNC: 13 MMOL/L — SIGNIFICANT CHANGE UP (ref 5–17)
ANION GAP SERPL CALC-SCNC: 6 MMOL/L — SIGNIFICANT CHANGE UP (ref 5–17)
APPEARANCE UR: ABNORMAL
APTT BLD: 35.7 SEC — SIGNIFICANT CHANGE UP (ref 26.1–36.8)
AST SERPL-CCNC: 23 U/L — SIGNIFICANT CHANGE UP (ref 10–40)
AST SERPL-CCNC: SIGNIFICANT CHANGE UP (ref 10–40)
BASOPHILS # BLD AUTO: 0.13 K/UL — SIGNIFICANT CHANGE UP (ref 0–0.2)
BASOPHILS NFR BLD AUTO: 1.3 % — SIGNIFICANT CHANGE UP (ref 0–2)
BILIRUB SERPL-MCNC: 0.4 MG/DL — SIGNIFICANT CHANGE UP (ref 0.2–1.2)
BILIRUB SERPL-MCNC: 0.5 MG/DL — SIGNIFICANT CHANGE UP (ref 0.2–1.2)
BILIRUB UR-MCNC: NEGATIVE — SIGNIFICANT CHANGE UP
BLD GP AB SCN SERPL QL: NEGATIVE — SIGNIFICANT CHANGE UP
BUN SERPL-MCNC: 106 MG/DL — HIGH (ref 7–23)
BUN SERPL-MCNC: 109 MG/DL — HIGH (ref 7–23)
BUN SERPL-MCNC: 110 MG/DL — HIGH (ref 7–23)
BUN SERPL-MCNC: 146 MG/DL — HIGH (ref 7–23)
BUN SERPL-MCNC: 150 MG/DL — HIGH (ref 7–23)
BUN SERPL-MCNC: 99 MG/DL — HIGH (ref 7–23)
CALCIUM SERPL-MCNC: 7 MG/DL — LOW (ref 8.4–10.5)
CALCIUM SERPL-MCNC: 8.4 MG/DL — SIGNIFICANT CHANGE UP (ref 8.4–10.5)
CALCIUM SERPL-MCNC: 8.5 MG/DL — SIGNIFICANT CHANGE UP (ref 8.4–10.5)
CALCIUM SERPL-MCNC: 9 MG/DL — SIGNIFICANT CHANGE UP (ref 8.4–10.5)
CALCIUM SERPL-MCNC: 9.1 MG/DL — SIGNIFICANT CHANGE UP (ref 8.4–10.5)
CALCIUM SERPL-MCNC: 9.2 MG/DL — SIGNIFICANT CHANGE UP (ref 8.4–10.5)
CHLORIDE SERPL-SCNC: 125 MMOL/L — HIGH (ref 96–108)
CHLORIDE SERPL-SCNC: 128 MMOL/L — HIGH (ref 96–108)
CHLORIDE SERPL-SCNC: 133 MMOL/L — HIGH (ref 96–108)
CHLORIDE SERPL-SCNC: 134 MMOL/L — HIGH (ref 96–108)
CO2 SERPL-SCNC: 16 MMOL/L — LOW (ref 22–31)
CO2 SERPL-SCNC: 21 MMOL/L — LOW (ref 22–31)
CO2 SERPL-SCNC: 22 MMOL/L — SIGNIFICANT CHANGE UP (ref 22–31)
CO2 SERPL-SCNC: 24 MMOL/L — SIGNIFICANT CHANGE UP (ref 22–31)
COLOR SPEC: YELLOW — SIGNIFICANT CHANGE UP
CREAT ?TM UR-MCNC: 71 MG/DL — SIGNIFICANT CHANGE UP
CREAT SERPL-MCNC: 1.21 MG/DL — SIGNIFICANT CHANGE UP (ref 0.5–1.3)
CREAT SERPL-MCNC: 1.22 MG/DL — SIGNIFICANT CHANGE UP (ref 0.5–1.3)
CREAT SERPL-MCNC: 1.31 MG/DL — HIGH (ref 0.5–1.3)
CREAT SERPL-MCNC: 1.37 MG/DL — HIGH (ref 0.5–1.3)
CREAT SERPL-MCNC: 1.59 MG/DL — HIGH (ref 0.5–1.3)
CREAT SERPL-MCNC: 1.61 MG/DL — HIGH (ref 0.5–1.3)
DIFF PNL FLD: ABNORMAL
EGFR: 30 ML/MIN/1.73M2 — LOW
EGFR: 36 ML/MIN/1.73M2 — LOW
EGFR: 36 ML/MIN/1.73M2 — LOW
EGFR: 38 ML/MIN/1.73M2 — LOW
EGFR: 38 ML/MIN/1.73M2 — LOW
EGFR: 42 ML/MIN/1.73M2 — LOW
EOSINOPHIL # BLD AUTO: 0.65 K/UL — HIGH (ref 0–0.5)
EOSINOPHIL NFR BLD AUTO: 6.3 % — HIGH (ref 0–6)
GAS PNL BLDV: SIGNIFICANT CHANGE UP
GLUCOSE SERPL-MCNC: 115 MG/DL — HIGH (ref 70–99)
GLUCOSE SERPL-MCNC: 124 MG/DL — HIGH (ref 70–99)
GLUCOSE SERPL-MCNC: 149 MG/DL — HIGH (ref 70–99)
GLUCOSE SERPL-MCNC: 156 MG/DL — HIGH (ref 70–99)
GLUCOSE SERPL-MCNC: 168 MG/DL — HIGH (ref 70–99)
GLUCOSE SERPL-MCNC: 93 MG/DL — SIGNIFICANT CHANGE UP (ref 70–99)
GLUCOSE UR QL: NEGATIVE MG/DL — SIGNIFICANT CHANGE UP
HCT VFR BLD CALC: 42.3 % — SIGNIFICANT CHANGE UP (ref 34.5–45)
HGB BLD-MCNC: 12.1 G/DL — SIGNIFICANT CHANGE UP (ref 11.5–15.5)
IMM GRANULOCYTES # BLD AUTO: 0.04 K/UL — SIGNIFICANT CHANGE UP (ref 0–0.07)
IMM GRANULOCYTES NFR BLD AUTO: 0.4 % — SIGNIFICANT CHANGE UP (ref 0–0.9)
INR BLD: 1.11 — SIGNIFICANT CHANGE UP (ref 0.85–1.16)
KETONES UR QL: NEGATIVE MG/DL — SIGNIFICANT CHANGE UP
LACTATE SERPL-SCNC: 1.6 MMOL/L — SIGNIFICANT CHANGE UP (ref 0.5–2)
LACTATE SERPL-SCNC: 2.1 MMOL/L — HIGH (ref 0.5–2)
LACTATE SERPL-SCNC: 2.4 MMOL/L — HIGH (ref 0.5–2)
LEUKOCYTE ESTERASE UR-ACNC: ABNORMAL
LIDOCAIN IGE QN: 18 U/L — SIGNIFICANT CHANGE UP (ref 7–60)
LYMPHOCYTES # BLD AUTO: 1.22 K/UL — SIGNIFICANT CHANGE UP (ref 1–3.3)
LYMPHOCYTES NFR BLD AUTO: 11.8 % — LOW (ref 13–44)
MAGNESIUM SERPL-MCNC: 3.1 MG/DL — HIGH (ref 1.6–2.6)
MCHC RBC-ENTMCNC: 27.9 PG — SIGNIFICANT CHANGE UP (ref 27–34)
MCHC RBC-ENTMCNC: 28.6 G/DL — LOW (ref 32–36)
MCV RBC AUTO: 97.7 FL — SIGNIFICANT CHANGE UP (ref 80–100)
MONOCYTES # BLD AUTO: 0.68 K/UL — SIGNIFICANT CHANGE UP (ref 0–0.9)
MONOCYTES NFR BLD AUTO: 6.6 % — SIGNIFICANT CHANGE UP (ref 2–14)
NEUTROPHILS # BLD AUTO: 7.58 K/UL — HIGH (ref 1.8–7.4)
NEUTROPHILS NFR BLD AUTO: 73.6 % — SIGNIFICANT CHANGE UP (ref 43–77)
NITRITE UR-MCNC: NEGATIVE — SIGNIFICANT CHANGE UP
NRBC # BLD AUTO: 0 K/UL — SIGNIFICANT CHANGE UP (ref 0–0)
NRBC # FLD: 0 K/UL — SIGNIFICANT CHANGE UP (ref 0–0)
NRBC BLD AUTO-RTO: 0 /100 WBCS — SIGNIFICANT CHANGE UP (ref 0–0)
OSMOLALITY SERPL: 389 MOSM/KG — HIGH (ref 280–301)
PH UR: 5.5 — SIGNIFICANT CHANGE UP (ref 5–8)
PLATELET # BLD AUTO: 352 K/UL — SIGNIFICANT CHANGE UP (ref 150–400)
PMV BLD: 11.3 FL — SIGNIFICANT CHANGE UP (ref 7–13)
POTASSIUM SERPL-MCNC: 3.8 MMOL/L — SIGNIFICANT CHANGE UP (ref 3.5–5.3)
POTASSIUM SERPL-MCNC: 3.9 MMOL/L — SIGNIFICANT CHANGE UP (ref 3.5–5.3)
POTASSIUM SERPL-MCNC: 4 MMOL/L — SIGNIFICANT CHANGE UP (ref 3.5–5.3)
POTASSIUM SERPL-MCNC: 4 MMOL/L — SIGNIFICANT CHANGE UP (ref 3.5–5.3)
POTASSIUM SERPL-MCNC: SIGNIFICANT CHANGE UP (ref 3.5–5.3)
POTASSIUM SERPL-MCNC: SIGNIFICANT CHANGE UP MMOL/L (ref 3.5–5.3)
POTASSIUM SERPL-SCNC: 3.8 MMOL/L — SIGNIFICANT CHANGE UP (ref 3.5–5.3)
POTASSIUM SERPL-SCNC: 3.9 MMOL/L — SIGNIFICANT CHANGE UP (ref 3.5–5.3)
POTASSIUM SERPL-SCNC: 4 MMOL/L — SIGNIFICANT CHANGE UP (ref 3.5–5.3)
POTASSIUM SERPL-SCNC: 4 MMOL/L — SIGNIFICANT CHANGE UP (ref 3.5–5.3)
POTASSIUM SERPL-SCNC: SIGNIFICANT CHANGE UP (ref 3.5–5.3)
POTASSIUM SERPL-SCNC: SIGNIFICANT CHANGE UP MMOL/L (ref 3.5–5.3)
POTASSIUM UR-SCNC: 51 MMOL/L — SIGNIFICANT CHANGE UP
PROT ?TM UR-MCNC: 35 MG/DL — HIGH (ref 0–12)
PROT SERPL-MCNC: 8.3 G/DL — SIGNIFICANT CHANGE UP (ref 6–8.3)
PROT SERPL-MCNC: 9.4 G/DL — HIGH (ref 6–8.3)
PROT UR-MCNC: 30 MG/DL
PROT/CREAT UR-RTO: 0.5 RATIO — HIGH (ref 0–0.2)
PROTHROM AB SERPL-ACNC: 13 SEC — SIGNIFICANT CHANGE UP (ref 9.9–13.4)
RBC # BLD: 4.33 M/UL — SIGNIFICANT CHANGE UP (ref 3.8–5.2)
RBC # FLD: 21.2 % — HIGH (ref 10.3–14.5)
RH IG SCN BLD-IMP: POSITIVE — SIGNIFICANT CHANGE UP
RH IG SCN BLD-IMP: POSITIVE — SIGNIFICANT CHANGE UP
SODIUM SERPL-SCNC: 157 MMOL/L — HIGH (ref 135–145)
SODIUM SERPL-SCNC: 160 MMOL/L — CRITICAL HIGH (ref 135–145)
SODIUM SERPL-SCNC: 160 MMOL/L — CRITICAL HIGH (ref 135–145)
SODIUM SERPL-SCNC: 163 MMOL/L — CRITICAL HIGH (ref 135–145)
SODIUM SERPL-SCNC: 164 MMOL/L — CRITICAL HIGH (ref 135–145)
SODIUM SERPL-SCNC: 165 MMOL/L — CRITICAL HIGH (ref 135–145)
SODIUM UR-SCNC: 31 MMOL/L — SIGNIFICANT CHANGE UP
SP GR SPEC: 1.03 — SIGNIFICANT CHANGE UP (ref 1–1.03)
UROBILINOGEN FLD QL: 1 MG/DL — SIGNIFICANT CHANGE UP (ref 0.2–1)
UUN UR-MCNC: 1433 MG/DL — SIGNIFICANT CHANGE UP
WBC # BLD: 10.3 K/UL — SIGNIFICANT CHANGE UP (ref 3.8–10.5)
WBC # FLD AUTO: 10.3 K/UL — SIGNIFICANT CHANGE UP (ref 3.8–10.5)

## 2025-07-05 PROCEDURE — 80053 COMPREHEN METABOLIC PANEL: CPT

## 2025-07-05 PROCEDURE — 84133 ASSAY OF URINE POTASSIUM: CPT

## 2025-07-05 PROCEDURE — 82330 ASSAY OF CALCIUM: CPT

## 2025-07-05 PROCEDURE — 83690 ASSAY OF LIPASE: CPT

## 2025-07-05 PROCEDURE — 84132 ASSAY OF SERUM POTASSIUM: CPT

## 2025-07-05 PROCEDURE — 84295 ASSAY OF SERUM SODIUM: CPT

## 2025-07-05 PROCEDURE — 87040 BLOOD CULTURE FOR BACTERIA: CPT

## 2025-07-05 PROCEDURE — 84540 ASSAY OF URINE/UREA-N: CPT

## 2025-07-05 PROCEDURE — 84300 ASSAY OF URINE SODIUM: CPT

## 2025-07-05 PROCEDURE — 74174 CTA ABD&PLVS W/CONTRAST: CPT | Mod: 26

## 2025-07-05 PROCEDURE — 36415 COLL VENOUS BLD VENIPUNCTURE: CPT

## 2025-07-05 PROCEDURE — 85025 COMPLETE CBC W/AUTO DIFF WBC: CPT

## 2025-07-05 PROCEDURE — 82570 ASSAY OF URINE CREATININE: CPT

## 2025-07-05 PROCEDURE — 99291 CRITICAL CARE FIRST HOUR: CPT | Mod: 25

## 2025-07-05 PROCEDURE — 85730 THROMBOPLASTIN TIME PARTIAL: CPT

## 2025-07-05 PROCEDURE — 84156 ASSAY OF PROTEIN URINE: CPT

## 2025-07-05 PROCEDURE — 81001 URINALYSIS AUTO W/SCOPE: CPT

## 2025-07-05 PROCEDURE — 82803 BLOOD GASES ANY COMBINATION: CPT

## 2025-07-05 PROCEDURE — 99233 SBSQ HOSP IP/OBS HIGH 50: CPT | Mod: GC

## 2025-07-05 PROCEDURE — 83930 ASSAY OF BLOOD OSMOLALITY: CPT

## 2025-07-05 PROCEDURE — 82962 GLUCOSE BLOOD TEST: CPT

## 2025-07-05 PROCEDURE — 85610 PROTHROMBIN TIME: CPT

## 2025-07-05 PROCEDURE — 99222 1ST HOSP IP/OBS MODERATE 55: CPT

## 2025-07-05 PROCEDURE — 83735 ASSAY OF MAGNESIUM: CPT

## 2025-07-05 PROCEDURE — 80048 BASIC METABOLIC PNL TOTAL CA: CPT

## 2025-07-05 PROCEDURE — 87086 URINE CULTURE/COLONY COUNT: CPT

## 2025-07-05 PROCEDURE — 83605 ASSAY OF LACTIC ACID: CPT

## 2025-07-05 PROCEDURE — 71045 X-RAY EXAM CHEST 1 VIEW: CPT | Mod: 26

## 2025-07-05 RX ORDER — DEXTROSE 50 % IN WATER 50 %
15 SYRINGE (ML) INTRAVENOUS ONCE
Refills: 0 | Status: DISCONTINUED | OUTPATIENT
Start: 2025-07-05 | End: 2025-07-16

## 2025-07-05 RX ORDER — SODIUM CHLORIDE 9 G/1000ML
1000 INJECTION, SOLUTION INTRAVENOUS
Refills: 0 | Status: DISCONTINUED | OUTPATIENT
Start: 2025-07-05 | End: 2025-07-06

## 2025-07-05 RX ORDER — MELATONIN 5 MG
3 TABLET ORAL AT BEDTIME
Refills: 0 | Status: DISCONTINUED | OUTPATIENT
Start: 2025-07-05 | End: 2025-07-16

## 2025-07-05 RX ORDER — SODIUM CHLORIDE 9 G/1000ML
1000 INJECTION, SOLUTION INTRAVENOUS
Refills: 0 | Status: DISCONTINUED | OUTPATIENT
Start: 2025-07-05 | End: 2025-07-05

## 2025-07-05 RX ORDER — MAGNESIUM, ALUMINUM HYDROXIDE 200-200 MG
30 TABLET,CHEWABLE ORAL EVERY 4 HOURS
Refills: 0 | Status: DISCONTINUED | OUTPATIENT
Start: 2025-07-05 | End: 2025-07-16

## 2025-07-05 RX ORDER — SODIUM CHLORIDE 9 G/1000ML
1000 INJECTION, SOLUTION INTRAVENOUS
Refills: 0 | Status: DISCONTINUED | OUTPATIENT
Start: 2025-07-05 | End: 2025-07-16

## 2025-07-05 RX ORDER — ACETAMINOPHEN 500 MG/5ML
650 LIQUID (ML) ORAL EVERY 6 HOURS
Refills: 0 | Status: DISCONTINUED | OUTPATIENT
Start: 2025-07-05 | End: 2025-07-16

## 2025-07-05 RX ORDER — DEXTROSE 50 % IN WATER 50 %
12.5 SYRINGE (ML) INTRAVENOUS ONCE
Refills: 0 | Status: DISCONTINUED | OUTPATIENT
Start: 2025-07-05 | End: 2025-07-16

## 2025-07-05 RX ORDER — DEXTROSE 50 % IN WATER 50 %
25 SYRINGE (ML) INTRAVENOUS ONCE
Refills: 0 | Status: DISCONTINUED | OUTPATIENT
Start: 2025-07-05 | End: 2025-07-16

## 2025-07-05 RX ORDER — SODIUM CHLORIDE 9 G/1000ML
500 INJECTION, SOLUTION INTRAVENOUS
Refills: 0 | Status: DISCONTINUED | OUTPATIENT
Start: 2025-07-05 | End: 2025-07-06

## 2025-07-05 RX ORDER — GLUCAGON 3 MG/1
1 POWDER NASAL ONCE
Refills: 0 | Status: DISCONTINUED | OUTPATIENT
Start: 2025-07-05 | End: 2025-07-16

## 2025-07-05 RX ORDER — GINKGO BILOBA 60 MG
0 TABLET ORAL
Refills: 0 | DISCHARGE

## 2025-07-05 RX ORDER — ATORVASTATIN CALCIUM 80 MG/1
1 TABLET, FILM COATED ORAL
Refills: 0 | DISCHARGE

## 2025-07-05 RX ADMIN — SODIUM CHLORIDE 175 MILLILITER(S): 9 INJECTION, SOLUTION INTRAVENOUS at 22:12

## 2025-07-05 RX ADMIN — Medication 650 MILLIGRAM(S): at 17:16

## 2025-07-05 RX ADMIN — Medication 650 MILLIGRAM(S): at 16:16

## 2025-07-05 RX ADMIN — SODIUM CHLORIDE 500 MILLILITER(S): 9 INJECTION, SOLUTION INTRAVENOUS at 19:52

## 2025-07-05 RX ADMIN — Medication 1000 MILLILITER(S): at 06:37

## 2025-07-05 RX ADMIN — SODIUM CHLORIDE 97 MILLILITER(S): 9 INJECTION, SOLUTION INTRAVENOUS at 11:45

## 2025-07-05 RX ADMIN — Medication 1000 MILLILITER(S): at 04:46

## 2025-07-05 NOTE — ED ADULT NURSE NOTE - OBJECTIVE STATEMENT
Pt brought in by daughter for rectal bleeding x 4 days and increased lethargy. Pt usually walks at baseline but family states she has not walked in 5 days. Noted with skin breakdown and skin wound to sacrum. Pt changed into hospital gown and placed onto cardiac monitor. Placed onto purewick for comfort.

## 2025-07-05 NOTE — CONSULT NOTE ADULT - SUBJECTIVE AND OBJECTIVE BOX
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 herpetic) and found to have FTT with hyponatremia.  Neurology consulted for worsening LE weakness.        In the ED:  Vitals: T 97.9, BP 90/61, HR 96, RR 20, SpO2 98% (RA)  Labs: MCHC 28.6 (H), smudge cells, lactate 2.4, sodium 157, , Cr 1.59, eGFR 30, glucose 124, protein total 9.4 , albumin 2.7, Mg 3.1  Urine: cloudy, protein 30, RBC 99, WBC 7 , moderate leukocyte esterase, few bacteria  CXR: normal  Imaging: CT scan showed no evidence of active gastrointestinal bleeding. Partially visualized left breast mass largest measuring up to 4.7cm, and increase in size  2L fluids (05 Jul 2025 09:01)      mRankin score 3 at baseline  0 No symptoms at all  1 No significant disability despite symptoms; able to carry out all usual duties and activities without assistance  2 Slight disability; unable to carry out all previous activities, but able to look after own affairs  3 Moderate disability; requiring some help, but able to walk without assistance  4 Moderately severe disability; unable to walk without assistance and unable to attend to own bodily needs without assistance  5 Severe disability; bedridden, incontinent and requiring constant nursing care and attention  6 Dead    MEDICATIONS  Home Medications:  atorvastatin 10 mg oral tablet: 1 tab(s) orally once a day (at bedtime) (05 Jul 2025 13:50)  Ginkgo Biloba oral tablet: orally once a day (05 Jul 2025 13:53)  valsartan 40 mg oral tablet: 1 tab(s) orally once a day (05 Jul 2025 13:51)  Vitamin D3:  (05 Jul 2025 13:52)    MEDICATIONS  (STANDING):  dextrose 5%. 1000 milliLiter(s) (50 mL/Hr) IV Continuous <Continuous>  dextrose 5%. 1000 milliLiter(s) (97 mL/Hr) IV Continuous <Continuous>  dextrose 5%. 1000 milliLiter(s) (100 mL/Hr) IV Continuous <Continuous>  dextrose 50% Injectable 25 Gram(s) IV Push once  dextrose 50% Injectable 12.5 Gram(s) IV Push once  dextrose 50% Injectable 25 Gram(s) IV Push once  glucagon  Injectable 1 milliGRAM(s) IntraMuscular once    MEDICATIONS  (PRN):  acetaminophen     Tablet .. 650 milliGRAM(s) Oral every 6 hours PRN Temp greater or equal to 38C (100.4F), Mild Pain (1 - 3)  aluminum hydroxide/magnesium hydroxide/simethicone Suspension 30 milliLiter(s) Oral every 4 hours PRN Dyspepsia  dextrose Oral Gel 15 Gram(s) Oral once PRN Blood Glucose LESS THAN 70 milliGRAM(s)/deciliter  melatonin 3 milliGRAM(s) Oral at bedtime PRN Insomnia      FAMILY HISTORY:    SOCIAL HISTORY: negative for tobacco, alcohol, or ilicit drug use.    Allergies    No Known Allergies    Intolerances        GEN: NAD, pleasant, cooperative    NEURO:   MENTAL STATUS: AAOx3  LANG/SPEECH: Fluent, intact naming, repetition & comprehension  CRANIAL NERVES:  II: Pupils equal and reactive, no RAPD, normal visual field and fundus  III, IV, VI: EOM intact, no gaze preference or deviation  V: normal  VII: no facial asymmetry  VIII: normal hearing to speech  MOTOR: 5/5 in both upper and lower extremities  REFLEXES: 2/4 throughout, bilateral flexor plantars  SENSORY: Normal to touch, temperature & pin prick in all extremiteis  COORD: Normal finger to nose and heel to shin, no tremor, no dysmetria  Gait:       LABS:                        12.1   10.30 )-----------( 352      ( 05 Jul 2025 04:38 )             42.3     07-05    163[HH]  |  128[H]  |  106[H]  ----------------------------<  149[H]  3.9   |  22  |  1.37[H]    Ca    9.0      05 Jul 2025 14:00  Mg     3.1     07-05    TPro  8.3  /  Alb  2.6[L]  /  TBili  0.4  /  DBili  x   /  AST  23  /  ALT  15  /  AlkPhos  65  07-05    Hemoglobin A1C:   Vitamin B12   PT/INR - ( 05 Jul 2025 04:38 )   PT: 13.0 sec;   INR: 1.11          PTT - ( 05 Jul 2025 04:38 )  PTT:35.7 sec  CAPILLARY BLOOD GLUCOSE      POCT Blood Glucose.: 100 mg/dL (05 Jul 2025 11:47)      Urinalysis Basic - ( 05 Jul 2025 14:00 )    Color: x / Appearance: x / SG: x / pH: x  Gluc: 149 mg/dL / Ketone: x  / Bili: x / Urobili: x   Blood: x / Protein: x / Nitrite: x   Leuk Esterase: x / RBC: x / WBC x   Sq Epi: x / Non Sq Epi: x / Bacteria: x            Microbiology:    Urinalysis with Rflx Culture (collected 05 Jul 2025 07:20)        RADIOLOGY, EKG AND ADDITIONAL TESTS: Reviewed.           NEUROLOGY CONSULT    HPI:  89y F Shanghainese/cantonese/limited English, b/l  hearing impairment (better hearing on left ear), with PMHx of Alzheimer's dementia without behavioral disturbance, AAOx1, partial ADL's, gait disturbance (RW assist, bed bound for 5 days),hx left frontal craniotomy for cerebral meningioma ( 2015), Obesity (BMI 29.7), HTN, HLD, CKD3a, hx colonic polyps, internal hemorrhoid and rectal prolapse ( 2021), hx hyponatremia, recently diagnosed stage IIIc (T3N2Mo) triple negative ( ER-/AZ-/HER2-) intraductal carcinoma( IDC) of left breast ~ 4.7cm ( 2/2025) followed by Dr.Paul Arroyo/Dr.Bonnie Keith s/p neoadjuvant chemoRx w/ [facility] ( cyclophosphamide/methotrexate/5-FU started 4/15/25, s/p cycle 3 6/3) now BIBEMS for rectal bleeding likely 2/2 sacral ulcer (? herpetic) and found to have FTT with hyponatremia.  Neurology consulted for worsening LE weakness.        mRankin score 3 at baseline  0 No symptoms at all  1 No significant disability despite symptoms; able to carry out all usual duties and activities without assistance  2 Slight disability; unable to carry out all previous activities, but able to look after own affairs  3 Moderate disability; requiring some help, but able to walk without assistance  4 Moderately severe disability; unable to walk without assistance and unable to attend to own bodily needs without assistance  5 Severe disability; bedridden, incontinent and requiring constant nursing care and attention  6 Dead    MEDICATIONS  Home Medications:  atorvastatin 10 mg oral tablet: 1 tab(s) orally once a day (at bedtime) (05 Jul 2025 13:50)  Ginkgo Biloba oral tablet: orally once a day (05 Jul 2025 13:53)  valsartan 40 mg oral tablet: 1 tab(s) orally once a day (05 Jul 2025 13:51)  Vitamin D3:  (05 Jul 2025 13:52)    MEDICATIONS  (STANDING):  dextrose 5%. 1000 milliLiter(s) (50 mL/Hr) IV Continuous <Continuous>  dextrose 5%. 1000 milliLiter(s) (97 mL/Hr) IV Continuous <Continuous>  dextrose 5%. 1000 milliLiter(s) (100 mL/Hr) IV Continuous <Continuous>  dextrose 50% Injectable 25 Gram(s) IV Push once  dextrose 50% Injectable 12.5 Gram(s) IV Push once  dextrose 50% Injectable 25 Gram(s) IV Push once  glucagon  Injectable 1 milliGRAM(s) IntraMuscular once    MEDICATIONS  (PRN):  acetaminophen     Tablet .. 650 milliGRAM(s) Oral every 6 hours PRN Temp greater or equal to 38C (100.4F), Mild Pain (1 - 3)  aluminum hydroxide/magnesium hydroxide/simethicone Suspension 30 milliLiter(s) Oral every 4 hours PRN Dyspepsia  dextrose Oral Gel 15 Gram(s) Oral once PRN Blood Glucose LESS THAN 70 milliGRAM(s)/deciliter  melatonin 3 milliGRAM(s) Oral at bedtime PRN Insomnia      FAMILY HISTORY:    SOCIAL HISTORY: negative for tobacco, alcohol, or ilicit drug use.    Allergies    No Known Allergies    Intolerances        GEN: NAD, pleasant, cooperative    NEURO:   MENTAL STATUS: AAOx1  LANG/SPEECH: impaired naming and repetition, severely dysarthric  CRANIAL NERVES:  II: Pupils equal and reactive, no RAPD, blink to threat intact b/l  III, IV, VI: EOM intact, no gaze preference or deviation  V: normal  VII: no facial asymmetry  VIII: normal hearing to speech  MOTOR: 4/5 b/l shoulder, 4+/5 wrist flexion/extension, R hand  slightly weaker than left, 3/5 hip flexion/extension b/l but drift noted in RLE, 2/5 R dorsiflexion/plantarflexion on the R, 3/5 on the left   REFLEXES: 2+/4 throughout, mute Babinski b/l  SENSORY: unable to assess  COORDINATION: unable to follow directions        LABS:                        12.1   10.30 )-----------( 352      ( 05 Jul 2025 04:38 )             42.3     07-05    163[HH]  |  128[H]  |  106[H]  ----------------------------<  149[H]  3.9   |  22  |  1.37[H]    Ca    9.0      05 Jul 2025 14:00  Mg     3.1     07-05    TPro  8.3  /  Alb  2.6[L]  /  TBili  0.4  /  DBili  x   /  AST  23  /  ALT  15  /  AlkPhos  65  07-05    Hemoglobin A1C:   Vitamin B12   PT/INR - ( 05 Jul 2025 04:38 )   PT: 13.0 sec;   INR: 1.11          PTT - ( 05 Jul 2025 04:38 )  PTT:35.7 sec  CAPILLARY BLOOD GLUCOSE      POCT Blood Glucose.: 100 mg/dL (05 Jul 2025 11:47)      Urinalysis Basic - ( 05 Jul 2025 14:00 )    Color: x / Appearance: x / SG: x / pH: x  Gluc: 149 mg/dL / Ketone: x  / Bili: x / Urobili: x   Blood: x / Protein: x / Nitrite: x   Leuk Esterase: x / RBC: x / WBC x   Sq Epi: x / Non Sq Epi: x / Bacteria: x            Microbiology:    Urinalysis with Rflx Culture (collected 05 Jul 2025 07:20)        RADIOLOGY, EKG AND ADDITIONAL TESTS: Reviewed.           NEUROLOGY CONSULT    HPI:  89y F Shanghainese/cantonese/limited English, b/l  hearing impairment (better hearing on left ear), with PMHx of Alzheimer's dementia without behavioral disturbance, AAOx1, partial ADL's, gait disturbance (RW assist, bed bound for 5 days),hx left frontal craniotomy for cerebral meningioma ( 2015), Obesity (BMI 29.7), HTN, HLD, CKD3a, hx colonic polyps, internal hemorrhoid and rectal prolapse ( 2021), recently diagnosed stage IIIc (T3N2Mo) triple negative ( ER-/AK-/HER2-) intraductal carcinoma( IDC) of left breast ~ 4.7cm ( 2/2025) followed by Dr.Paul Arroyo/Dr.Bonnie Keith s/p neoadjuvant chemoRx w/ [facility] ( cyclophosphamide/methotrexate/5-FU started 4/15/25, s/p cycle 3 6/3) now BIBEMS for rectal bleeding likely 2/2 sacral ulcer (? herpetic) and found to have FTT with severe hypernatremia.  Neurology consulted for worsening LE weakness.        mRankin score 3 at baseline  0 No symptoms at all  1 No significant disability despite symptoms; able to carry out all usual duties and activities without assistance  2 Slight disability; unable to carry out all previous activities, but able to look after own affairs  3 Moderate disability; requiring some help, but able to walk without assistance  4 Moderately severe disability; unable to walk without assistance and unable to attend to own bodily needs without assistance  5 Severe disability; bedridden, incontinent and requiring constant nursing care and attention  6 Dead    MEDICATIONS  Home Medications:  atorvastatin 10 mg oral tablet: 1 tab(s) orally once a day (at bedtime) (05 Jul 2025 13:50)  Ginkgo Biloba oral tablet: orally once a day (05 Jul 2025 13:53)  valsartan 40 mg oral tablet: 1 tab(s) orally once a day (05 Jul 2025 13:51)  Vitamin D3:  (05 Jul 2025 13:52)    MEDICATIONS  (STANDING):  dextrose 5%. 1000 milliLiter(s) (50 mL/Hr) IV Continuous <Continuous>  dextrose 5%. 1000 milliLiter(s) (97 mL/Hr) IV Continuous <Continuous>  dextrose 5%. 1000 milliLiter(s) (100 mL/Hr) IV Continuous <Continuous>  dextrose 50% Injectable 25 Gram(s) IV Push once  dextrose 50% Injectable 12.5 Gram(s) IV Push once  dextrose 50% Injectable 25 Gram(s) IV Push once  glucagon  Injectable 1 milliGRAM(s) IntraMuscular once    MEDICATIONS  (PRN):  acetaminophen     Tablet .. 650 milliGRAM(s) Oral every 6 hours PRN Temp greater or equal to 38C (100.4F), Mild Pain (1 - 3)  aluminum hydroxide/magnesium hydroxide/simethicone Suspension 30 milliLiter(s) Oral every 4 hours PRN Dyspepsia  dextrose Oral Gel 15 Gram(s) Oral once PRN Blood Glucose LESS THAN 70 milliGRAM(s)/deciliter  melatonin 3 milliGRAM(s) Oral at bedtime PRN Insomnia      FAMILY HISTORY:    SOCIAL HISTORY: negative for tobacco, alcohol, or ilicit drug use.    Allergies    No Known Allergies    Intolerances        GEN: NAD, pleasant, cooperative    NEURO:   MENTAL STATUS: AAOx1  LANG/SPEECH: impaired naming and repetition, severely dysarthric  CRANIAL NERVES:  II: Pupils equal and reactive, no RAPD, blink to threat intact b/l  III, IV, VI: EOM intact, no gaze preference or deviation  V: normal  VII: no facial asymmetry  VIII: normal hearing to speech  MOTOR: 4/5 b/l shoulder, 4+/5 wrist flexion/extension, R hand  slightly weaker than left, 3/5 hip flexion/extension b/l but drift noted in RLE, 2/5 R dorsiflexion/plantarflexion on the R, 3/5 on the left   REFLEXES: 2+/4 throughout, mute Babinski b/l  SENSORY: unable to assess  COORDINATION: unable to follow directions        LABS:                        12.1   10.30 )-----------( 352      ( 05 Jul 2025 04:38 )             42.3     07-05    163[HH]  |  128[H]  |  106[H]  ----------------------------<  149[H]  3.9   |  22  |  1.37[H]    Ca    9.0      05 Jul 2025 14:00  Mg     3.1     07-05    TPro  8.3  /  Alb  2.6[L]  /  TBili  0.4  /  DBili  x   /  AST  23  /  ALT  15  /  AlkPhos  65  07-05    Hemoglobin A1C:   Vitamin B12   PT/INR - ( 05 Jul 2025 04:38 )   PT: 13.0 sec;   INR: 1.11          PTT - ( 05 Jul 2025 04:38 )  PTT:35.7 sec  CAPILLARY BLOOD GLUCOSE      POCT Blood Glucose.: 100 mg/dL (05 Jul 2025 11:47)      Urinalysis Basic - ( 05 Jul 2025 14:00 )    Color: x / Appearance: x / SG: x / pH: x  Gluc: 149 mg/dL / Ketone: x  / Bili: x / Urobili: x   Blood: x / Protein: x / Nitrite: x   Leuk Esterase: x / RBC: x / WBC x   Sq Epi: x / Non Sq Epi: x / Bacteria: x            Microbiology:    Urinalysis with Rflx Culture (collected 05 Jul 2025 07:20)        RADIOLOGY, EKG AND ADDITIONAL TESTS: Reviewed.

## 2025-07-05 NOTE — PATIENT PROFILE ADULT - HOW OFTEN DOES ANYONE, INCLUDING FAMILY AND FRIENDS, PHYSICALLY HURT YOU?
Spoke to patient via phone.  Explained the process of receiving the SS Card and Birth Certificate.  Will hand all paperwork into the nurse when completed.  
no

## 2025-07-05 NOTE — H&P ADULT - NSHPREVIEWOFSYSTEMS_GEN_ALL_CORE
REVIEW OF SYSTEMS:  CONSTITUTIONAL: No weakness, fevers or chills. Speech incomprehensible.   EYES/ENT: No visual changes;  No vertigo or throat pain   NECK: No pain or stiffness  RESPIRATORY: No cough, wheezing, hemoptysis; No shortness of breath  CARDIOVASCULAR: No chest pain or palpitations  GASTROINTESTINAL: No abdominal or epigastric pain. No nausea, vomiting, or hematemesis; No diarrhea or constipation.   GENITOURINARY: No dysuria, frequency or hematuria  NEUROLOGICAL: No numbness or weakness  SKIN: Sacral decubitus ulcer oozing blood. No itching, rashes. REVIEW OF SYSTEMS:  CONSTITUTIONAL: No weakness, fevers or chills. Speech incomprehensible.   EYES/ENT: No visual changes;  No vertigo or throat pain   NECK: No pain or stiffness  RESPIRATORY: No cough, wheezing, hemoptysis; No shortness of breath  CARDIOVASCULAR: No chest pain or palpitations  GASTROINTESTINAL: No abdominal or epigastric pain. No nausea, vomiting, or hematemesis; No diarrhea or constipation.   GENITOURINARY: No dysuria, frequency or hematuria  NEUROLOGICAL: No numbness or weakness  SKIN: Sacral decubitus ulcer oozing blood. Rash over the left lower extremity. Scaly patches on the knee.

## 2025-07-05 NOTE — H&P ADULT - PROBLEM SELECTOR PLAN 8
F: tolerating PO, no IVF  E: replete K<4, Mg<2  N: NPO    VTE Prophylaxis: Heparin 5000 q8  GI: not needed  C: Full Code  D: RMF Patient has a history of hypertension. She was prescribed valsartan 40mg but hasn't been taking it for weeks per daughter.  Blood pressure on admission was 90/61 went up to 137/58 after fluid bolus in the ED.    - hold home medications Patient has a history of left frontal meningioma that was resected in 2015    Patient has a history of hypertension. She was prescribed valsartan 40mg but hasn't been taking it for weeks per daughter.  Blood pressure on admission was 90/61 went up to 137/58 after fluid bolus in the ED.    - hold home medications Patient has a history of left frontal meningioma that was resected in 2015    Patient has a history of hypertension. She was prescribed valsartan 40mg per surescripts but hasn't been taking it for weeks per daughter.  Blood pressure on admission was 90/61 went up to 137/58 after fluid bolus in the ED.    - hold home valsartan

## 2025-07-05 NOTE — SWALLOW BEDSIDE ASSESSMENT ADULT - PHARYNGEAL PHASE
Seemingly age appropriate timely swallow, hyolaryngeal movement appreciated, with no clinical indicators of penetration/aspiration and no gross clinical indicators of pharyngeal inefficiency noted.

## 2025-07-05 NOTE — H&P ADULT - NSHPPHYSICALEXAM_GEN_ALL_CORE
Physical Examination:  General: Alert and oriented x1 (to person only).   Eyes: EOMI. Anicteric.  HEENT: Moist mucous membranes. No scleral icterus. No cervical lymphadenopathy.  Lungs: Clear to auscultation bilaterally. No accessory muscle use.  Cardiovascular: Regular rate and rhythm. No murmur. No JVD.  Abdomen: Mildly tender to palpitation. No palpable masses.  Extremities: Bilateral 2+ edema in the lower extremities.  Skin: No rashes or lesions. Warm.  Neurologic: No focal neurological deficits. CN II-XII grossly intact, but not individually tested.  Psychiatric: Cooperative. Physical Examination:  General: Alert and oriented x1 (to person only).   Eyes: EOMI. Anicteric.  HEENT: Moist mucous membranes. No scleral icterus. No cervical lymphadenopathy.  Lungs: Clear to auscultation bilaterally. No accessory muscle use.  Cardiovascular: Regular rate and rhythm. No murmur. No JVD.  Abdomen: Mildly tender to palpitation. No palpable masses.  Extremities: Bilateral 2+ edema in the lower extremities.  Skin: Patient has a sacral decubitus ulcer.  Neurologic: No focal neurological deficits. CN II-XII grossly intact, but not individually tested.  Psychiatric: Cooperative. Physical Examination:  General: Alert and oriented x1 (to person only).   Eyes: EOMI. Anicteric.  HEENT: Moist mucous membranes. No scleral icterus. No cervical lymphadenopathy.  Lungs: Clear to auscultation bilaterally. No accessory muscle use.  Cardiovascular: Regular rate and rhythm. No murmur. No JVD.  Abdomen: Mildly tender to palpitation. No palpable masses.  Extremities: Bilateral 2+ edema in the lower extremities.  Skin: Patient has a sacral decubitus ulcer.  Neurologic: No focal neurological deficits. CN II-XII grossly intact, but not individually tested.  Psychiatric: Cooperative.  Palpable mass in the left breast

## 2025-07-05 NOTE — H&P ADULT - PROBLEM SELECTOR PLAN 10
F: tolerating PO, no IVF  E: replete K<4, Mg<2  N: NPO    VTE Prophylaxis: Heparin 5000 q8  GI: not needed  C: Full Code  D: RMF F: tolerating PO, no IVF  E: replete K<4, Mg<2  N: NPO    VTE Prophylaxis: None  GI: not needed  C: Full Code  D: RMF F: tolerating PO, no IVF  E: replete K<4, Mg<2  N: NPO    VTE Prophylaxis: None  GI: miralax  C: DNR/DNI  D: RMF

## 2025-07-05 NOTE — H&P ADULT - PROBLEM SELECTOR PLAN 3
Patient has a history of triple negative breast cancer, with metastasis to the lymph node. She was deemed as not a candidate for immunotherapy or surgery. She was started on chemotherapy and underwent 3 cycles, last one 3-4 weeks ago.  Per daughter, patient missed a few of her cycles due to difficulty moving. Patient's daughter was told that the patient has CKD but patient never experienced any problems with urination and has appropriate urine output.  On admission: Cr 1.59, , eGFR 30    - as above Patient's daughter was told that the patient has CKD but patient never experienced any problems with urination and has appropriate urine output.  On admission: Cr 1.59, , eGFR 30    - manage hypernatremia and bladder scans as above

## 2025-07-05 NOTE — H&P ADULT - PROBLEM SELECTOR PLAN 5
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    - CTM

## 2025-07-05 NOTE — ED PROVIDER NOTE - CLINICAL SUMMARY MEDICAL DECISION MAKING FREE TEXT BOX
BRBPR for past 4 days, no vomiting, no AC. hasn't gotten out of bed for past few days. possible side effect from chemo vs diverticular bleed. initially hypotense  -check labs  -ekg  -cxr  -CTA to r/o active bleed  -ivf

## 2025-07-05 NOTE — H&P ADULT - ATTENDING COMMENTS
89y F Shanghainese/cantonese/limited English, b/l  hearing impairment (better hearing on left ear), with PMHx of Alzheimer's dementia without behavioral disturbance, AAOx1, partial ADL's, gait disturbance (RW assist, bed bound for 5 days),hx left frontal craniotomy for cerebral meningioma ( 2015), Obesity (BMI 29.7), HTN, HLD, CKD3a, hx colonic polyps, internal hemorrhoid and rectal prolapse ( 2021), hx hyponatremia, recently diagnosed stage IIIc (T3N2Mo) triple negative ( ER-/OR-/HER2-) intraductal carcinoma( IDC) of left breast ~ 4.7cm ( 2/2025) followed by Dr.Paul Arroyo/Dr.Bonnie Keith s/p neoadjuvant chemoRx w/ CMF ( cyclophosphamide/methotrexate/5-FU started 4/15/25, s/p cycle 3 6/3) now BIBEMS for rectal bleeding likely 2/2 sacral ulcer (? herpetic) and found to have FTT with hyponatremia, RAFFI on CKD3a and functional quadriplegia/bed bound for 5 days.   AAOx1 conversant in Cantonese NAD Heart RRR normal S1 S2,Lungs clear Abd soft ND/NT, Ext: no edema, chronic skin changes, Rectal: rectal fissure laceration and a 2-3 rectal ulcers~ 1cm, neuro non-focal  Labs/imaging reviewed     # rectal bleed  -CTA abd negative, doubt LGIB/diverticulosis bleed, Hgb normal 12. likely bleeding from the rectal ulcer doubt sacral decubitus, to rule out HSV/genital ulcers, local wound care, WOCN consult, HSV wound culture. If positive HSV, will treat with Valtrex 1g PO bid for 7 days     # Hx Hyponatremia/SIADH  # Hypernatremia  # RAFFI on CKD3a  - FWD 4.3L, D5W @ 100ml/hr , repeat BMP q8h  - SNa baseline 133( 4/15/25)-> 157 ( ED 7/5)-> 165 peaked -> 127   - SCr baseline 0.7( 4/15/25)-> 1.61 (ED)-> 1.61 peaked -> 1.21  - bladder scan for PVR q6hr, SC for PVR>300ml, pinzon if failed SC x3   - ensure regular bowel movement- miralax     # FTT likely related to chemoRx   - Nutrition consult   - Ensure max protein (Vanilla) bid     # Deconditioning   # Functional quadriplegia   - PT eval   - Fall precaution    # Stage IIIc/T3N2/Mo triple negative IDC L breast   - undergoing neoadjuvant chemo; CMF s/p 3 cycles 4/15, 5/13 and 6/3   - Breast surgeon Dr. Mauri arroyo, Med Onc Dr. Poly Keith     # Alzheimer's dementia  - delirium precaution     # DVT ppx: SCDs     # Advance directives: DNR/DNI ( MOLST form filled out) GOC as d/w daughter/Yoli, no heroic measures i.e. CPR or intubation in an event required resuscitation     Contacts:  legal guardian( daughter): Bozena (Yoli)Tomasa [phone]  PMD Dr.Daniel Schumacher  Breast Surgeon: Dr.Paul Arroyo   Med Onc Dr. Poly Keith   Rad Onc Dr. Jesse Villeda     POC as d/w admitting resident Dr. Ron Watkins 89y F Shanghainese/cantonese/limited English, b/l  hearing impairment (better hearing on left ear), with PMHx of Alzheimer's dementia without behavioral disturbance, AAOx1, partial ADL's, gait disturbance (RW assist, bed bound for 5 days),hx left frontal craniotomy for cerebral meningioma ( 2015), Obesity (BMI 29.7), HTN, HLD, CKD3a, hx colonic polyps, internal hemorrhoid and rectal prolapse ( 2021), hx hyponatremia, recently diagnosed stage IIIc (T3N2Mo) triple negative ( ER-/ID-/HER2-) intraductal carcinoma( IDC) of left breast ~ 4.7cm ( 2/2025) followed by Dr.Paul Arroyo/Dr.Bonnie Keith s/p neoadjuvant chemoRx w/ CMF ( cyclophosphamide/methotrexate/5-FU started 4/15/25, s/p cycle 3 6/3) now BIBEMS for rectal bleeding likely 2/2 sacral ulcer (? herpetic) and found to have FTT with hyponatremia, RAFFI on CKD3a and functional quadriplegia/bed bound for 5 days.   AAOx1 conversant in Cantonese NAD Heart RRR normal S1 S2,Lungs clear Abd soft ND/NT, Ext: no edema, chronic skin changes, Rectal: rectal fissure laceration and a 2-3 rectal ulcers~ 1cm, neuro non-focal  Labs/imaging reviewed     # rectal bleed  -CTA abd negative, doubt LGIB/diverticulosis bleed, Hgb normal 12. likely bleeding from the rectal ulcer doubt sacral decubitus, to rule out HSV/genital ulcers, local wound care, WOCN consult, HSV wound culture. If positive HSV, will treat with Valtrex 1g PO bid for 7 days     # Hx Hyponatremia/SIADH  # Hypernatremia  # RAFFI on CKD3a  - FWD 4.3L, D5W @ 100ml/hr , repeat BMP q8h  - SNa baseline 133( 4/15/25)-> 157 ( ED 7/5)-> 165 peaked -> 127   - SCr baseline 0.7( 4/15/25)-> 1.61 (ED)-> 1.61 peaked -> 1.21  - bladder scan for PVR q6hr, SC for PVR>300ml, pinzon if failed SC x3   - ensure regular bowel movement- miralax     # FTT likely related to chemoRx   # Protein Calorie malnutrition (albumin 2.6-2.7)   - Nutrition consult   - Ensure max protein (Vanilla) bid     # Deconditioning   # Functional quadriplegia   - PT eval ( may need JEREMY as d/w daughter)   - Fall precaution    # Stage IIIc/T3N2/Mo triple negative IDC L breast   - undergoing neoadjuvant chemo; CMF s/p 3 cycles 4/15, 5/13 and 6/3   - Breast surgeon Dr. Mauri arroyo, Med Onc Dr. Poly Keith     # Alzheimer's dementia  - delirium precaution     # DVT ppx: SCDs     # Advance directives: DNR/DNI ( MOLST form filled out) GOC as d/w daughter/Yoli, no heroic measures i.e. CPR or intubation in an event required resuscitation. Consider Palliative care consult , will inform Dr. Arroyo and Dr. Keith about admission     Contacts:  legal guardian( daughter): Bozena (Yoli) Tomasa [phone]  PMD Dr.Daniel Schumacher  Breast Surgeon: Dr.Paul Arroyo   Med Onc Dr. Poly Keith   Rad Onc Dr. Jesse Villeda     POC as d/w admitting resident Dr. Ron Watkins

## 2025-07-05 NOTE — H&P ADULT - PROBLEM SELECTOR PLAN 9
Patient has a history of hypertension. She was prescribed lovastatin but hasn't been taking it for weeks per daughter. Patient has a history of hypertension. She was prescribed lovastatin but hasn't been taking it for weeks per daughter.    - hold home meds Patient has a history of hyperlipidemia. She was prescribed atorvastatin 10mg per surescripts  but hasn't been taking it for weeks per daughter.    - hold home meds

## 2025-07-05 NOTE — H&P ADULT - HISTORY OF PRESENT ILLNESS
89y F with pmh Alzheimer's dementia, metastatic breast cancer, hypertension, stage 3a chronic kidney disease, hyperlipidemia, cerebral meningioma, hyponatremia, and vitamin D deficiency brought in by EMS for rectal bleeding.  The patient's daughter reports noticing blood in the stool for the past 4 days and denies associated nausea, vomiting, or diarrhea. She also states the patient has been unable to ambulate for the past 5 days secondary to leg swelling and is currently bedbound. The daughter denies worsening confusion.  The patient was started on chemotherapy (daughter unable to specify the regimen) and received 3 cycles of treatment  with the last one being 3 weeks ago.    In the ED:  Vitals: T 97.9, BP 90/61, HR 96, RR 20, SpO2 98% (RA)  Labs: MCHC 28.6 (H), smudge cells, lactate 2.4, sodium 157, , Cr 1.59, eGFR 30, glucose 124, protein total 9.4 , albumin 2.7, Mg 3.1  Urine: cloudy, protein 30, RBC 99, WBC 7 , moderate leukocyte esterase, few bacteria  CXR: normal  Imaging: CT scan showed no evidence of active gastrointestinal bleeding. Partially visualized left breast mass largest measuring up to 4.7cm, and increase in size  2L fluids 89y F with pmh dementia, metastatic breast cancer, hypertension, stage 3a chronic kidney disease, hyperlipidemia, cerebral meningioma, hyponatremia, and vitamin D deficiency brought in by EMS for rectal bleeding.  The patient's daughter reports noticing blood (maroon and dark in nature) in the stool for the past 4 days and denies associated nausea, vomiting, or diarrhea. The daughter reports that the patient had an ulcer on her lower back for 2 weeks and that she occasionally scratches it. The patient hasn't been eating for the past week and has been given ensure and soup by he daughter.  She also states the patient has been unable to ambulate and has been bedbound since Betina 15. The daughter denies worsening confusion.  The patient was started on CMF chemotherapy regimen received 3 cycles of treatment  with the last one being on Betina-3-2025.  She had rectal bleeding in 2021 and underwent colonoscopy which showed multiple polyps, all of which were removed.    In the ED:  Vitals: T 97.9, BP 90/61, HR 96, RR 20, SpO2 98% (RA)  Labs: MCHC 28.6 (H), smudge cells, lactate 2.4, sodium 157, , Cr 1.59, eGFR 30, glucose 124, protein total 9.4 , albumin 2.7, Mg 3.1  Urine: cloudy, protein 30, RBC 99, WBC 7 , moderate leukocyte esterase, few bacteria  CXR: normal  Imaging: CT scan showed no evidence of active gastrointestinal bleeding. Partially visualized left breast mass largest measuring up to 4.7cm, and increase in size  2L fluids 89y F with pmh dementia, metastatic breast cancer, hypertension, stage 3a chronic kidney disease, hyperlipidemia, cerebral meningioma, hyponatremia, and vitamin D deficiency brought in by EMS for rectal bleeding.  The patient's daughter reports noticing blood (maroon and dark in nature) in the stool for the past 4 days and denies associated nausea, vomiting, or diarrhea. The daughter reports that the patient had an ulcer on her lower back for 2 weeks and that she occasionally scratches it. The patient hasn't been eating for the past week and has been given ensure and soup by he daughter.  The patient was started on CMF chemotherapy regimen received 3 cycles of treatment  with the last one being on Betina-3-2025. She was scheduled for her next cycle on Betina 15 but was unable to attend to it as she has been having difficulty ambulating and became bedbound since that time,  She had rectal bleeding in 2021 and underwent colonoscopy which showed multiple polyps, all of which were removed.  She follows up with Dr. Tyson Schumacher (PCP), Dr. Poly Keith (med onc), Dr. Jesse Villeda (rad onc), and Dr. Mauri Arroyo (breast surgeon)      In the ED:  Vitals: T 97.9, BP 90/61, HR 96, RR 20, SpO2 98% (RA)  Labs: MCHC 28.6 (H), smudge cells, lactate 2.4, sodium 157, , Cr 1.59, eGFR 30, glucose 124, protein total 9.4 , albumin 2.7, Mg 3.1  Urine: cloudy, protein 30, RBC 99, WBC 7 , moderate leukocyte esterase, few bacteria  CXR: normal  Imaging: CT scan showed no evidence of active gastrointestinal bleeding. Partially visualized left breast mass largest measuring up to 4.7cm, and increase in size  2L fluids

## 2025-07-05 NOTE — H&P ADULT - NSHPSOCIALHISTORY_GEN_ALL_CORE
Patient lives with her daughter. Patient lives with her daughter who takes care of her on daily basis. Patient lives with her daughter Bozena who is also her legal guardian [phone]

## 2025-07-05 NOTE — H&P ADULT - ASSESSMENT
89y F with pmh Alzheimer's dementia, metastatic breast cancer, hypertension, stage 3a chronic kidney disease, hyperlipidemia, cerebral meningioma, hyponatremia, and vitamin D deficiency brought in by EMS for rectal bleeding. 89y F with pmh Alzheimer's dementia, metastatic breast cancer, hypertension, stage 3a chronic kidney disease, hyperlipidemia, cerebral meningioma, hyponatremia, and vitamin D deficiency brought in by EMS for rectal bleeding.

## 2025-07-05 NOTE — ED PROVIDER NOTE - OBJECTIVE STATEMENT
89F hx dementia, left breast ca (on chemo, last infusion a few weeks ago), high chol, BIBEMS for rectal bleeding. per daughter noticed bleeding in stool for past 4 days. daughter states no vomiting and pt has been drinking ensure and soup. no fevers. daughter states pt has not walked for past 5 days. states she is at her baseline mentation. no prior rectal bleeding. no AC.

## 2025-07-05 NOTE — PATIENT PROFILE ADULT - FALL HARM RISK - HARM RISK INTERVENTIONS
Assistance with ambulation/Assistance OOB with selected safe patient handling equipment/Communicate Risk of Fall with Harm to all staff/Discuss with provider need for PT consult/Monitor gait and stability/Reinforce activity limits and safety measures with patient and family/Tailored Fall Risk Interventions/Visual Cue: Yellow wristband and red socks/Bed in lowest position, wheels locked, appropriate side rails in place/Call bell, personal items and telephone in reach/Instruct patient to call for assistance before getting out of bed or chair/Non-slip footwear when patient is out of bed/[place] to call system/Physically safe environment - no spills, clutter or unnecessary equipment/Purposeful Proactive Rounding/Room/bathroom lighting operational, light cord in reach Assistance with ambulation/Assistance OOB with selected safe patient handling equipment/Communicate Risk of Fall with Harm to all staff/Discuss with provider need for PT consult/Monitor gait and stability/Provide patient with walking aids - walker, cane, crutches/Reinforce activity limits and safety measures with patient and family/Tailored Fall Risk Interventions/Visual Cue: Yellow wristband and red socks/Bed in lowest position, wheels locked, appropriate side rails in place/Call bell, personal items and telephone in reach/Instruct patient to call for assistance before getting out of bed or chair/Non-slip footwear when patient is out of bed/[place] to call system/Physically safe environment - no spills, clutter or unnecessary equipment/Purposeful Proactive Rounding/Room/bathroom lighting operational, light cord in reach

## 2025-07-05 NOTE — H&P ADULT - TIME BILLING
Time spent on this encounter including   reviewed HIE for collaterals, chart, vitals,labs, imaging   interpretation and documentation  discussion with caregiver and housestaff   excluded teaching time, separately billed services

## 2025-07-05 NOTE — H&P ADULT - PROBLEM SELECTOR PLAN 4
Patient has a history of triple negative breast cancer, with metastasis to the lymph node. She was deemed as not a candidate for immunotherapy or surgery. She was started on chemotherapy and underwent 3 cycles, last one 3-4 weeks ago.  Per daughter, patient missed a few of her cycles due to difficulty moving. 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 She was deemed as not a candidate for immunotherapy or surgery. She was started on chemotherapy and underwent 3 cycles, last one on 3-June 2025.  Per daughter, patient missed her cycle on Betina-15 due to difficulty ambulation. 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 She was deemed as not a candidate for immunotherapy or surgery. She was started on chemotherapy and underwent 3 cycles, last one on 3-June 2025.  Per daughter, patient missed her cycle on Betina-15 due to difficulty ambulation.  She follows up with Dr. Poly Keith (med onc), Dr. Jesse Villeda (rad onc), and Dr. Mauri Arroyo (breast surgeon)    - Palliative consulted, f/u recs  - Dr. Keith and Dr. Arroyo were notified via email

## 2025-07-05 NOTE — ED PROVIDER NOTE - PROGRESS NOTE DETAILS
pt with hypernatremia and RAFFI, urine dark in color, possibly d/t dehydration. will continue ivf. pending CT results  pt more alert after 1st L NS.

## 2025-07-05 NOTE — H&P ADULT - PROBLEM SELECTOR PLAN 2
Sodium was 157 on admission, went up to 165 after the administration of 2L of NS.  Patient has 4.3L free water deficit Patient has a history of CKD stage 3a and hyponatremia (possibly 2/2 SIADH per outpatient nephro note).  Sodium was 157 on admission, went up to 165 after the administration of 2L of NS.  Patient has 4.3L free water deficit    - 97 ml/hr D5W  - repeat BMP to trend sodium q8 Patient has a history of CKD stage 3a and hyponatremia (possibly 2/2 SIADH per outpatient nephro note).  Sodium was 157 on admission, went up to 165 after the administration of 2L of NS.  Patient has 4.3L free water deficit    - 97 ml/hr D5W  - repeat BMP to trend sodium q8  - bladder scan for PVR q6h

## 2025-07-05 NOTE — SWALLOW BEDSIDE ASSESSMENT ADULT - ORAL PHASE
Reduced labial seal via cup resulting in dumping of liquid into oral cavity. However, adequate labial closure and retrieval of liquids via straw. Seemingly functional bolus manipulation with ice cream and functional oral clearance.

## 2025-07-05 NOTE — H&P ADULT - NSHPLABSRESULTS_GEN_ALL_CORE
.  LABS:                         12.1   10.30 )-----------( 352      ( 05 Jul 2025 04:38 )             42.3     07-05    160[HH]  |  133[H]  |  110[H]  ----------------------------<  93  x    |  16[L]  |  1.21    Ca    7.0[L]      05 Jul 2025 07:51  Mg     3.1     07-05    TPro  8.3  /  Alb  2.6[L]  /  TBili  0.4  /  DBili  x   /  AST  23  /  ALT  15  /  AlkPhos  65  07-05    PT/INR - ( 05 Jul 2025 04:38 )   PT: 13.0 sec;   INR: 1.11          PTT - ( 05 Jul 2025 04:38 )  PTT:35.7 sec  Urinalysis Basic - ( 05 Jul 2025 07:51 )    Color: x / Appearance: x / SG: x / pH: x  Gluc: 93 mg/dL / Ketone: x  / Bili: x / Urobili: x   Blood: x / Protein: x / Nitrite: x   Leuk Esterase: x / RBC: x / WBC x   Sq Epi: x / Non Sq Epi: x / Bacteria: x            Lactate, Blood: 2.1 mmol/L (07-05 @ 06:41)  Lactate, Blood: 2.4 mmol/L (07-05 @ 04:38)      RADIOLOGY, EKG & ADDITIONAL TESTS: Reviewed.

## 2025-07-05 NOTE — ED ADULT TRIAGE NOTE - CHIEF COMPLAINT QUOTE
per daughter pt has had rectal bleeding for 4 days.   pt is at baseline mentation, has not walked in 5 days

## 2025-07-05 NOTE — CONSULT NOTE ADULT - ASSESSMENT
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 herpetic) and found to have FTT with hyponatremia. At baseline, patient was walking 3 weeks ago and symptoms progressively worsening since last chemotherapy session.  Neurology consulted for worsening LE weakness. On exam, patient is severely dysarthric, slightly decreased  strength RUE compared to LUE, and decreased strength b/l LE (3/5) with drift in the RLE compared to left. Given presentation and exam, differentials include TME given severe hypernatremia and dehydration vs possible sub-acute stroke vs medication-induced neuropathy (from methotrexate or 5-FU).    Recommendations:  - obtain MRI brain noncon  - treat underlying TME  - rest per primary team    **THIS IS AN INCOMPLETE NOTE** This is the case of a 89-year-old female Shanghainese/cantonese/limited English speaking, b/l hearing impairment (better hearing on left ear), with PMHx of Alzheimer's dementia without behavioral disturbance, AAOx1, partial ADL's, gait disturbance (RW assist, bed bound for 5 days),hx left frontal craniotomy for cerebral meningioma ( 2015), Obesity (BMI 29.7), HTN, HLD, CKD3a, hx colonic polyps, internal hemorrhoid and rectal prolapse ( 2021), hx hyponatremia, recently diagnosed stage IIIc (T3N2Mo) triple negative ( ER-/NV-/HER2-) intraductal carcinoma( IDC) of left breast ~ 4.7cm ( 2/2025) followed by Dr.Paul Arroyo/Dr.Bonnie Keith s/p neoadjuvant chemoRx w/ CMF ( cyclophosphamide/methotrexate/5-FU started 4/15/25, s/p cycle 3 6/3) now BIBEMS for rectal bleeding likely 2/2 sacral ulcer (? herpetic) and found to have FTT with hyponatremia. At baseline, patient was walking 3 weeks ago and symptoms progressively worsening since last chemotherapy session. On admission, patient severely hypernatremic to 160.  Neurology consulted for worsening LE weakness and AMS. On exam, patient is severely dysarthric, slightly decreased  strength RUE compared to LUE, and decreased strength b/l LE (3/5) with drift in the RLE compared to left. Given presentation and exam, differentials include TME given severe hypernatremia and dehydration vs possible sub-acute stroke vs medication-induced neuropathy (from methotrexate or 5-FU).    Recommendations:  - obtain MRI brain noncon  - treat underlying TME  - rest per primary team    Case discussed with Neurology attending Dr. Kaminski.    **THIS IS AN INCOMPLETE NOTE** This is the case of a 89-year-old female Shanghainese/cantonese/limited English speaking, b/l hearing impairment (better hearing on left ear), with PMHx of Alzheimer's dementia without behavioral disturbance, AAOx1, partial ADL's, gait disturbance (RW assist, bed bound for 5 days),hx left frontal craniotomy for cerebral meningioma ( 2015), Obesity (BMI 29.7), HTN, HLD, CKD3a, hx colonic polyps, internal hemorrhoid and rectal prolapse ( 2021), hx hyponatremia, recently diagnosed stage IIIc (T3N2Mo) triple negative ( ER-/CO-/HER2-) intraductal carcinoma( IDC) of left breast ~ 4.7cm ( 2/2025) followed by Dr.Paul Arroyo/Dr.Bonnie Keith s/p neoadjuvant chemoRx w/ CMF ( cyclophosphamide/methotrexate/5-FU started 4/15/25, s/p cycle 3 6/3) now BIBEMS for rectal bleeding likely 2/2 sacral ulcer (? herpetic) and found to have FTT with hyponatremia. At baseline, patient was walking 3 weeks ago and symptoms progressively worsening since last chemotherapy session. On admission, patient severely hypernatremic to 160.  Neurology consulted for worsening LE weakness and AMS. On exam, patient is severely dysarthric, slightly decreased  strength RUE compared to LUE, and decreased strength b/l LE (3/5) with drift in the RLE compared to left. Given presentation and exam, differentials include TME given severe hypernatremia and dehydration vs possible sub-acute stroke vs intracerebral pathology (tumor, mets..) vs medication-induced neuropathy (from methotrexate or 5-FU).    Recommendations:  - please get STAT CTH noncon to rule out acute pathology as MRI might take time  - obtain MRI brain w/wo contrast   - treat underlying TME  - rest per primary team    Case discussed with Neurology attending Dr. Kaminski.     This is the case of a 89-year-old female Shanghainese/cantonese/limited English speaking, b/l hearing impairment (better hearing on left ear), with PMHx of Alzheimer's dementia without behavioral disturbance, AAOx1, partial ADL's, gait disturbance (RW assist, bed bound for 5 days),hx left frontal craniotomy for cerebral meningioma ( 2015), Obesity (BMI 29.7), HTN, HLD, CKD3a, hx colonic polyps, internal hemorrhoid and rectal prolapse ( 2021), hx hyponatremia, recently diagnosed stage IIIc (T3N2Mo) triple negative ( ER-/ME-/HER2-) intraductal carcinoma( IDC) of left breast ~ 4.7cm ( 2/2025) followed by Dr.Paul Arroyo/Dr.Bonnie Keith s/p neoadjuvant chemoRx w/ CMF ( cyclophosphamide/methotrexate/5-FU started 4/15/25, s/p cycle 3 6/3) now BIBEMS for rectal bleeding likely 2/2 sacral ulcer (? herpetic) and found to have FTT with hyponatremia. At baseline, patient was walking 3 weeks ago and symptoms progressively worsening since last chemotherapy session. On admission, patient severely hypernatremic to 160.  Neurology consulted for worsening LE weakness and AMS. On exam, patient is severely dysarthric, slightly decreased  strength RUE compared to LUE, and decreased strength b/l LE (3/5) with drift in the RLE compared to left. Given presentation and exam, differentials include TME given severe hypernatremia and dehydration vs possible sub-acute stroke vs intracerebral pathology (tumor, mets..) vs medication-induced neuropathy (from methotrexate or 5-FU).    Recommendations:  - please get STAT CTH noncon to rule out acute pathology as MRI might take time  - obtain MRI brain w/wo contrast and MRI L-spine  - treat underlying TME  - rest per primary team    Case discussed with Neurology attending Dr. Kaminski.     This is the case of a 89-year-old female Shanghainese/cantonese/limited English speaking, b/l hearing impairment (better hearing on left ear), with PMHx of Alzheimer's dementia without behavioral disturbance, AAOx1, partial ADL's, gait disturbance (RW assist, bed bound for 5 days),hx left frontal craniotomy for cerebral meningioma ( 2015), Obesity (BMI 29.7), HTN, HLD, CKD3a, hx colonic polyps, internal hemorrhoid and rectal prolapse ( 2021), hx hyponatremia, recently diagnosed stage IIIc (T3N2Mo) triple negative ( ER-/MT-/HER2-) intraductal carcinoma( IDC) of left breast ~ 4.7cm ( 2/2025) followed by Dr.Paul Arroyo/Dr.Bonnie Keith s/p neoadjuvant chemoRx w/ CMF ( cyclophosphamide/methotrexate/5-FU started 4/15/25, s/p cycle 3 6/3) now BIBEMS for rectal bleeding likely 2/2 sacral ulcer (? herpetic) and found to have FTT with hyponatremia. At baseline, patient was walking 3 weeks ago and symptoms progressively worsening since last chemotherapy session. On admission, patient severely hypernatremic to 160.  Neurology consulted for worsening LE weakness and AMS. On exam, patient is severely dysarthric, slightly decreased  strength RUE compared to LUE, and decreased strength b/l LE (3/5) with drift in the RLE compared to left. Given presentation and exam, differentials include TME given severe hypernatremia and dehydration vs possible sub-acute stroke vs intracerebral pathology (tumor, mets..) vs medication-induced neuropathy (from methotrexate or 5-FU).    Recommendations:  - please get STAT CTH noncon to rule out acute pathology as MRI might take time  - obtain MRI brain w/wo contrast and MRI L-spine w/wo contrast  - treat underlying TME  - rest per primary team    Case discussed with Neurology attending Dr. Kaminski.

## 2025-07-05 NOTE — H&P ADULT - PROBLEM SELECTOR PLAN 1
Patient's daughter started noticing blood in the stool for the past 4 days while changing her diapers. Initially, GI bleed was suspected, but CT angio of abdomen/pelvis was normal and patient's hgb was 12.1 but on examination patient has an oozing sacral decubitus ulcer on examination.   Bleeding is likely 2/2 sacral decubitus ulcer.    - wound care consulted Patient's daughter started noticing blood in the stool for the past 4 days while changing her diapers. Initially, GI bleed was suspected, but CT angio of abdomen/pelvis was normal and patient's hgb was 12.1 but on examination patient has an oozing sacral decubitus ulcer on examination. Patient has been having difficulty walking due to leg swelling and has been bed bound recently.  Bleeding is likely 2/2 sacral decubitus ulcer.    - wound care consulted  - consult renal Patient's daughter started noticing blood in the stool for the past 4 days while changing her diapers. Initially, GI bleed was suspected, but CT angio of abdomen/pelvis was normal and patient's hgb was 12.1 but on examination patient has an oozing sacral decubitus ulcer on examination. Patient has been having difficulty walking due to leg swelling and has been bed bound recently.  Bleeding is likely 2/2 sacral decubitus ulcer.    - wound care consulted  - HSV swab Patient's daughter started noticing blood in the stool for the past 4 days while changing her diapers. Initially, GI bleed was suspected, but CT angio of abdomen/pelvis was normal and patient's hgb was 12.1 but on examination patient has an oozing sacral decubitus ulcer on examination. Patient has been having difficulty walking due to leg swelling and has been bed bound recently.  Bleeding is likely 2/2 sacral decubitus ulcer.    - wound care consulted  - HSV swab  - start DVT prophylaxis in the AM if no GI is confirmed Patient's daughter started noticing blood in the stool for the past 4 days while changing her diapers. Initially, GI bleed was suspected, but CT angio of abdomen/pelvis was normal and patient's hgb was 12.1 but on examination patient has an oozing sacral decubitus ulcer on examination. Patient has been having difficulty walking due to leg swelling and has been bed bound recently.  Bleeding is likely 2/2 sacral decubitus ulcer.    - wound care consulted  - HSV swab  - start DVT prophylaxis in the AM if no GI bleeding is confirmed

## 2025-07-05 NOTE — PROVIDER CONTACT NOTE (CRITICAL VALUE NOTIFICATION) - SITUATION
Covering for FRANCISCO Alegria: Received a call from the lab with above mentioned critical result.
Sodium 160
Sodium 163
Sodium 164

## 2025-07-05 NOTE — PROVIDER CONTACT NOTE (CRITICAL VALUE NOTIFICATION) - ACTION/TREATMENT ORDERED:
MD Momin made aware. No interventions ordered at this time.
MD Momin made aware. No interventions ordered at this time.
MD Watkins made aware. No interventions ordered at this time.

## 2025-07-05 NOTE — PROVIDER CONTACT NOTE (CRITICAL VALUE NOTIFICATION) - PERSON GIVING RESULT:
[facility] - April
[facility] - April
lab
CARI Seaman
[facility] - Dania Escobar

## 2025-07-05 NOTE — ED PROVIDER NOTE - CARE PLAN
1 Principal Discharge DX:	BRBPR (bright red blood per rectum)   Principal Discharge DX:	BRBPR (bright red blood per rectum)  Secondary Diagnosis:	RAFFI (acute kidney injury)  Secondary Diagnosis:	Hypernatremia

## 2025-07-05 NOTE — H&P ADULT - PROBLEM SELECTOR PLAN 7
Daughter reports the patient had a brain mass that was removed 7 years ago Daughter reports the patient had a brain mass that was removed in 2015

## 2025-07-05 NOTE — H&P ADULT - PROBLEM SELECTOR PLAN 6
Patient has a history of dementia. A&Ox1 to person only, but daughter states that this is the baseline and denies any deterioration and confusion.    - CTM

## 2025-07-05 NOTE — CONSULT NOTE ADULT - ATTENDING COMMENTS
severe dysarthria and weakness in the setting of severe toxic metabolic derangements. no CNS imaging performed. broad ddx for new neurological issues given her profound medical comorbidities including chronic meningitis or hydrocephalus, tumor recurrence, paraneoplastic condition, chemotherapy side effects    - please get STAT CTH noncon to rule out acute pathology as MRI might take time  - obtain MRI brain w/wo contrast and MRI L-spine w/wo contrast (prioritize brain)  - treat underlying TME  - rest per primary team

## 2025-07-05 NOTE — PATIENT PROFILE ADULT - STATED REASON FOR ADMISSION
Daughter brought pt to the ED because pt could not stand up and saw bleeding coming from her buttocks

## 2025-07-05 NOTE — ED PROVIDER NOTE - NSICDXPASTMEDICALHX_GEN_ALL_CORE_FT
PAST MEDICAL HISTORY:  Alzheimer's disease Alzheimer's dementia    Benign neoplasm of cerebral meninges Left frontal lobe    Breast cancer     Essential hypertension HTN (hypertension)    Hyperlipidemia Hyperlipidemia

## 2025-07-06 DIAGNOSIS — R33.9 RETENTION OF URINE, UNSPECIFIED: ICD-10-CM

## 2025-07-06 DIAGNOSIS — R53.1 WEAKNESS: ICD-10-CM

## 2025-07-06 LAB
A1C WITH ESTIMATED AVERAGE GLUCOSE RESULT: 6.1 % — HIGH (ref 4–5.6)
ALBUMIN SERPL ELPH-MCNC: 2.3 G/DL — LOW (ref 3.3–5)
ALP SERPL-CCNC: 53 U/L — SIGNIFICANT CHANGE UP (ref 40–120)
ALT FLD-CCNC: 12 U/L — SIGNIFICANT CHANGE UP (ref 10–45)
ANION GAP SERPL CALC-SCNC: 13 MMOL/L — SIGNIFICANT CHANGE UP (ref 5–17)
ANION GAP SERPL CALC-SCNC: 8 MMOL/L — SIGNIFICANT CHANGE UP (ref 5–17)
ANION GAP SERPL CALC-SCNC: 8 MMOL/L — SIGNIFICANT CHANGE UP (ref 5–17)
AST SERPL-CCNC: 20 U/L — SIGNIFICANT CHANGE UP (ref 10–40)
BASOPHILS # BLD AUTO: 0.06 K/UL — SIGNIFICANT CHANGE UP (ref 0–0.2)
BASOPHILS NFR BLD AUTO: 0.5 % — SIGNIFICANT CHANGE UP (ref 0–2)
BILIRUB SERPL-MCNC: 0.4 MG/DL — SIGNIFICANT CHANGE UP (ref 0.2–1.2)
BLD GP AB SCN SERPL QL: NEGATIVE — SIGNIFICANT CHANGE UP
BUN SERPL-MCNC: 47 MG/DL — HIGH (ref 7–23)
BUN SERPL-MCNC: 59 MG/DL — HIGH (ref 7–23)
BUN SERPL-MCNC: 82 MG/DL — HIGH (ref 7–23)
CALCIUM SERPL-MCNC: 7.7 MG/DL — LOW (ref 8.4–10.5)
CALCIUM SERPL-MCNC: 8 MG/DL — LOW (ref 8.4–10.5)
CALCIUM SERPL-MCNC: 8.3 MG/DL — LOW (ref 8.4–10.5)
CHLORIDE SERPL-SCNC: 115 MMOL/L — HIGH (ref 96–108)
CHLORIDE SERPL-SCNC: 121 MMOL/L — HIGH (ref 96–108)
CHLORIDE SERPL-SCNC: 123 MMOL/L — HIGH (ref 96–108)
CK SERPL-CCNC: 77 U/L — SIGNIFICANT CHANGE UP (ref 25–170)
CO2 SERPL-SCNC: 20 MMOL/L — LOW (ref 22–31)
CO2 SERPL-SCNC: 21 MMOL/L — LOW (ref 22–31)
CO2 SERPL-SCNC: 21 MMOL/L — LOW (ref 22–31)
CREAT SERPL-MCNC: 0.89 MG/DL — SIGNIFICANT CHANGE UP (ref 0.5–1.3)
CREAT SERPL-MCNC: 0.97 MG/DL — SIGNIFICANT CHANGE UP (ref 0.5–1.3)
CREAT SERPL-MCNC: 1.1 MG/DL — SIGNIFICANT CHANGE UP (ref 0.5–1.3)
EGFR: 47 ML/MIN/1.73M2 — LOW
EGFR: 47 ML/MIN/1.73M2 — LOW
EGFR: 55 ML/MIN/1.73M2 — LOW
EGFR: 55 ML/MIN/1.73M2 — LOW
EGFR: 61 ML/MIN/1.73M2 — SIGNIFICANT CHANGE UP
EGFR: 61 ML/MIN/1.73M2 — SIGNIFICANT CHANGE UP
EOSINOPHIL # BLD AUTO: 0.67 K/UL — HIGH (ref 0–0.5)
EOSINOPHIL NFR BLD AUTO: 5.6 % — SIGNIFICANT CHANGE UP (ref 0–6)
ESTIMATED AVERAGE GLUCOSE: 128 MG/DL — HIGH (ref 68–114)
GLUCOSE SERPL-MCNC: 133 MG/DL — HIGH (ref 70–99)
GLUCOSE SERPL-MCNC: 160 MG/DL — HIGH (ref 70–99)
GLUCOSE SERPL-MCNC: 95 MG/DL — SIGNIFICANT CHANGE UP (ref 70–99)
HCT VFR BLD CALC: 34.9 % — SIGNIFICANT CHANGE UP (ref 34.5–45)
HGB BLD-MCNC: 9.8 G/DL — LOW (ref 11.5–15.5)
HSV+VZV DNA SPEC QL NAA+PROBE: ABNORMAL
IMM GRANULOCYTES # BLD AUTO: 0.06 K/UL — SIGNIFICANT CHANGE UP (ref 0–0.07)
IMM GRANULOCYTES NFR BLD AUTO: 0.5 % — SIGNIFICANT CHANGE UP (ref 0–0.9)
LYMPHOCYTES # BLD AUTO: 1.03 K/UL — SIGNIFICANT CHANGE UP (ref 1–3.3)
LYMPHOCYTES NFR BLD AUTO: 8.6 % — LOW (ref 13–44)
MAGNESIUM SERPL-MCNC: 2.3 MG/DL — SIGNIFICANT CHANGE UP (ref 1.6–2.6)
MCHC RBC-ENTMCNC: 27.8 PG — SIGNIFICANT CHANGE UP (ref 27–34)
MCHC RBC-ENTMCNC: 28.1 G/DL — LOW (ref 32–36)
MCV RBC AUTO: 98.9 FL — SIGNIFICANT CHANGE UP (ref 80–100)
MONOCYTES # BLD AUTO: 0.63 K/UL — SIGNIFICANT CHANGE UP (ref 0–0.9)
MONOCYTES NFR BLD AUTO: 5.3 % — SIGNIFICANT CHANGE UP (ref 2–14)
NEUTROPHILS # BLD AUTO: 9.53 K/UL — HIGH (ref 1.8–7.4)
NEUTROPHILS NFR BLD AUTO: 79.5 % — HIGH (ref 43–77)
NRBC # BLD AUTO: 0 K/UL — SIGNIFICANT CHANGE UP (ref 0–0)
NRBC # FLD: 0 K/UL — SIGNIFICANT CHANGE UP (ref 0–0)
NRBC BLD AUTO-RTO: 0 /100 WBCS — SIGNIFICANT CHANGE UP (ref 0–0)
PHOSPHATE SERPL-MCNC: 2.6 MG/DL — SIGNIFICANT CHANGE UP (ref 2.5–4.5)
PLATELET # BLD AUTO: 294 K/UL — SIGNIFICANT CHANGE UP (ref 150–400)
PMV BLD: 11 FL — SIGNIFICANT CHANGE UP (ref 7–13)
POTASSIUM SERPL-MCNC: 3.6 MMOL/L — SIGNIFICANT CHANGE UP (ref 3.5–5.3)
POTASSIUM SERPL-MCNC: 3.7 MMOL/L — SIGNIFICANT CHANGE UP (ref 3.5–5.3)
POTASSIUM SERPL-MCNC: 3.8 MMOL/L — SIGNIFICANT CHANGE UP (ref 3.5–5.3)
POTASSIUM SERPL-SCNC: 3.6 MMOL/L — SIGNIFICANT CHANGE UP (ref 3.5–5.3)
POTASSIUM SERPL-SCNC: 3.7 MMOL/L — SIGNIFICANT CHANGE UP (ref 3.5–5.3)
POTASSIUM SERPL-SCNC: 3.8 MMOL/L — SIGNIFICANT CHANGE UP (ref 3.5–5.3)
PROT SERPL-MCNC: 6.9 G/DL — SIGNIFICANT CHANGE UP (ref 6–8.3)
RBC # BLD: 3.53 M/UL — LOW (ref 3.8–5.2)
RBC # FLD: 21 % — HIGH (ref 10.3–14.5)
RH IG SCN BLD-IMP: POSITIVE — SIGNIFICANT CHANGE UP
SODIUM SERPL-SCNC: 144 MMOL/L — SIGNIFICANT CHANGE UP (ref 135–145)
SODIUM SERPL-SCNC: 152 MMOL/L — HIGH (ref 135–145)
SODIUM SERPL-SCNC: 154 MMOL/L — HIGH (ref 135–145)
SPECIMEN SOURCE: SIGNIFICANT CHANGE UP
WBC # BLD: 11.98 K/UL — HIGH (ref 3.8–10.5)
WBC # FLD AUTO: 11.98 K/UL — HIGH (ref 3.8–10.5)

## 2025-07-06 PROCEDURE — 82570 ASSAY OF URINE CREATININE: CPT

## 2025-07-06 PROCEDURE — 70450 CT HEAD/BRAIN W/O DYE: CPT | Mod: 26

## 2025-07-06 PROCEDURE — 84540 ASSAY OF URINE/UREA-N: CPT

## 2025-07-06 PROCEDURE — 87529 HSV DNA AMP PROBE: CPT

## 2025-07-06 PROCEDURE — 82803 BLOOD GASES ANY COMBINATION: CPT

## 2025-07-06 PROCEDURE — 36415 COLL VENOUS BLD VENIPUNCTURE: CPT

## 2025-07-06 PROCEDURE — 84295 ASSAY OF SERUM SODIUM: CPT

## 2025-07-06 PROCEDURE — 83605 ASSAY OF LACTIC ACID: CPT

## 2025-07-06 PROCEDURE — 84133 ASSAY OF URINE POTASSIUM: CPT

## 2025-07-06 PROCEDURE — 87040 BLOOD CULTURE FOR BACTERIA: CPT

## 2025-07-06 PROCEDURE — 85730 THROMBOPLASTIN TIME PARTIAL: CPT

## 2025-07-06 PROCEDURE — 80048 BASIC METABOLIC PNL TOTAL CA: CPT

## 2025-07-06 PROCEDURE — 83690 ASSAY OF LIPASE: CPT

## 2025-07-06 PROCEDURE — 85025 COMPLETE CBC W/AUTO DIFF WBC: CPT

## 2025-07-06 PROCEDURE — 84132 ASSAY OF SERUM POTASSIUM: CPT

## 2025-07-06 PROCEDURE — 82550 ASSAY OF CK (CPK): CPT

## 2025-07-06 PROCEDURE — 84300 ASSAY OF URINE SODIUM: CPT

## 2025-07-06 PROCEDURE — 87798 DETECT AGENT NOS DNA AMP: CPT

## 2025-07-06 PROCEDURE — 87086 URINE CULTURE/COLONY COUNT: CPT

## 2025-07-06 PROCEDURE — 99233 SBSQ HOSP IP/OBS HIGH 50: CPT

## 2025-07-06 PROCEDURE — 84100 ASSAY OF PHOSPHORUS: CPT

## 2025-07-06 PROCEDURE — 82962 GLUCOSE BLOOD TEST: CPT

## 2025-07-06 PROCEDURE — 83735 ASSAY OF MAGNESIUM: CPT

## 2025-07-06 PROCEDURE — 82330 ASSAY OF CALCIUM: CPT

## 2025-07-06 PROCEDURE — 81001 URINALYSIS AUTO W/SCOPE: CPT

## 2025-07-06 PROCEDURE — 83930 ASSAY OF BLOOD OSMOLALITY: CPT

## 2025-07-06 PROCEDURE — 85610 PROTHROMBIN TIME: CPT

## 2025-07-06 PROCEDURE — 84156 ASSAY OF PROTEIN URINE: CPT

## 2025-07-06 PROCEDURE — 83036 HEMOGLOBIN GLYCOSYLATED A1C: CPT

## 2025-07-06 PROCEDURE — 80053 COMPREHEN METABOLIC PANEL: CPT

## 2025-07-06 RX ORDER — ENOXAPARIN SODIUM 100 MG/ML
40 INJECTION SUBCUTANEOUS EVERY 24 HOURS
Refills: 0 | Status: DISCONTINUED | OUTPATIENT
Start: 2025-07-06 | End: 2025-07-16

## 2025-07-06 RX ORDER — SODIUM CHLORIDE 9 G/1000ML
500 INJECTION, SOLUTION INTRAVENOUS ONCE
Refills: 0 | Status: COMPLETED | OUTPATIENT
Start: 2025-07-06 | End: 2025-07-06

## 2025-07-06 RX ORDER — BENZONATATE 100 MG
100 CAPSULE ORAL THREE TIMES A DAY
Refills: 0 | Status: DISCONTINUED | OUTPATIENT
Start: 2025-07-06 | End: 2025-07-16

## 2025-07-06 RX ORDER — SODIUM CHLORIDE 9 G/1000ML
1000 INJECTION, SOLUTION INTRAVENOUS
Refills: 0 | Status: DISCONTINUED | OUTPATIENT
Start: 2025-07-06 | End: 2025-07-07

## 2025-07-06 RX ORDER — SENNA 187 MG
2 TABLET ORAL AT BEDTIME
Refills: 0 | Status: DISCONTINUED | OUTPATIENT
Start: 2025-07-06 | End: 2025-07-16

## 2025-07-06 RX ORDER — POLYETHYLENE GLYCOL 3350 17 G/17G
17 POWDER, FOR SOLUTION ORAL EVERY 12 HOURS
Refills: 0 | Status: DISCONTINUED | OUTPATIENT
Start: 2025-07-06 | End: 2025-07-16

## 2025-07-06 RX ORDER — POTASSIUM PHOSPHATE, MONOBASIC POTASSIUM PHOSPHATE, DIBASIC INJECTION, 236; 224 MG/ML; MG/ML
15 SOLUTION, CONCENTRATE INTRAVENOUS ONCE
Refills: 0 | Status: COMPLETED | OUTPATIENT
Start: 2025-07-06 | End: 2025-07-06

## 2025-07-06 RX ADMIN — Medication 100 MILLIGRAM(S): at 01:04

## 2025-07-06 RX ADMIN — POLYETHYLENE GLYCOL 3350 17 GRAM(S): 17 POWDER, FOR SOLUTION ORAL at 17:13

## 2025-07-06 RX ADMIN — ENOXAPARIN SODIUM 40 MILLIGRAM(S): 100 INJECTION SUBCUTANEOUS at 16:15

## 2025-07-06 RX ADMIN — POTASSIUM PHOSPHATE, MONOBASIC POTASSIUM PHOSPHATE, DIBASIC INJECTION, 63.75 MILLIMOLE(S): 236; 224 SOLUTION, CONCENTRATE INTRAVENOUS at 09:46

## 2025-07-06 RX ADMIN — SODIUM CHLORIDE 1500 MILLILITER(S): 9 INJECTION, SOLUTION INTRAVENOUS at 19:06

## 2025-07-06 NOTE — PROGRESS NOTE ADULT - PROBLEM SELECTOR PLAN 2
Patient's daughter started noticing blood in the stool for the past 4 days while changing her diapers. Initially, GI bleed was suspected, but CT angio of abdomen/pelvis was normal and patient's hgb was 12.1 but on examination patient has an oozing sacral decubitus ulcer on examination. Patient has been having difficulty walking due to leg swelling and has been bed bound recently.  Bleeding is likely 2/2 sacral decubitus ulcer.    - wound care consulted  - HSV swab PND  - DVT ppx resumed - bladder scan with 380cc x2, straight cathed   - possible etiology: constipation, KUB with increased stool burden, miralax and senna ordered  - if failed x3, consider need for pinzon

## 2025-07-06 NOTE — PROGRESS NOTE ADULT - SUBJECTIVE AND OBJECTIVE BOX
INTERVAL/OVERNIGHT EVENTS: None    SUBJECTIVE:  Patient seen and examined at bedside, comfortable. AOx1, baseline for pt. following commands.     Vital Signs Last 12 Hrs  T(F): 98.6 (07-06-25 @ 11:44), Max: 98.6 (07-06-25 @ 11:44)  HR: 92 (07-06-25 @ 11:44) (85 - 92)  BP: 138/66 (07-06-25 @ 11:44) (132/50 - 138/66)  BP(mean): 90 (07-06-25 @ 11:44) (78 - 90)  RR: 18 (07-06-25 @ 11:44) (18 - 18)  SpO2: 98% (07-06-25 @ 11:44) (95% - 98%)  I&O's Summary    05 Jul 2025 07:01  -  06 Jul 2025 07:00  --------------------------------------------------------  IN: 0 mL / OUT: 1350 mL / NET: -1350 mL    06 Jul 2025 07:01  -  06 Jul 2025 14:01  --------------------------------------------------------  IN: 0 mL / OUT: 400 mL / NET: -400 mL        PHYSICAL EXAM:  General: NAD  HEENT: PERRL, EOM intact, sclera anicteric, MMM  Cardiovascular: RRR; no MRG;   Respiratory: CTAB; no WRR  GI/: soft; NTND; BS x4  Extremities: WWP; 2+ peripheral pulses bilaterally; no LE edema  Skin: normal color & turgor; no rash  Neurologic: aox3; no focal deficits    LABS:                        9.8    11.98 )-----------( 294      ( 06 Jul 2025 06:45 )             34.9     07-06    152[H]  |  123[H]  |  82[H]  ----------------------------<  160[H]  3.8   |  21[L]  |  1.10    Ca    8.0[L]      06 Jul 2025 06:45  Phos  2.6     07-06  Mg     2.3     07-06    TPro  6.9  /  Alb  2.3[L]  /  TBili  0.4  /  DBili  x   /  AST  20  /  ALT  12  /  AlkPhos  53  07-06    PT/INR - ( 05 Jul 2025 04:38 )   PT: 13.0 sec;   INR: 1.11          PTT - ( 05 Jul 2025 04:38 )  PTT:35.7 sec  Urinalysis Basic - ( 06 Jul 2025 06:45 )    Color: x / Appearance: x / SG: x / pH: x  Gluc: 160 mg/dL / Ketone: x  / Bili: x / Urobili: x   Blood: x / Protein: x / Nitrite: x   Leuk Esterase: x / RBC: x / WBC x   Sq Epi: x / Non Sq Epi: x / Bacteria: x          RADIOLOGY & ADDITIONAL TESTS:    MEDICATIONS  (STANDING):  dextrose 5%. 1000 milliLiter(s) (175 mL/Hr) IV Continuous <Continuous>  dextrose 5%. 1000 milliLiter(s) (100 mL/Hr) IV Continuous <Continuous>  dextrose 5%. 1000 milliLiter(s) (50 mL/Hr) IV Continuous <Continuous>  dextrose 50% Injectable 25 Gram(s) IV Push once  dextrose 50% Injectable 12.5 Gram(s) IV Push once  dextrose 50% Injectable 25 Gram(s) IV Push once  enoxaparin Injectable 40 milliGRAM(s) SubCutaneous every 24 hours  glucagon  Injectable 1 milliGRAM(s) IntraMuscular once  polyethylene glycol 3350 17 Gram(s) Oral every 12 hours  senna 2 Tablet(s) Oral at bedtime    MEDICATIONS  (PRN):  acetaminophen     Tablet .. 650 milliGRAM(s) Oral every 6 hours PRN Temp greater or equal to 38C (100.4F), Mild Pain (1 - 3)  aluminum hydroxide/magnesium hydroxide/simethicone Suspension 30 milliLiter(s) Oral every 4 hours PRN Dyspepsia  benzonatate 100 milliGRAM(s) Oral three times a day PRN Cough  dextrose Oral Gel 15 Gram(s) Oral once PRN Blood Glucose LESS THAN 70 milliGRAM(s)/deciliter  melatonin 3 milliGRAM(s) Oral at bedtime PRN Insomnia   INTERVAL/OVERNIGHT EVENTS: None    SUBJECTIVE:  Patient seen and examined at bedside, comfortable. AOx1, baseline for pt. following commands, no focal deficits.     Vital Signs Last 12 Hrs  T(F): 98.6 (07-06-25 @ 11:44), Max: 98.6 (07-06-25 @ 11:44)  HR: 92 (07-06-25 @ 11:44) (85 - 92)  BP: 138/66 (07-06-25 @ 11:44) (132/50 - 138/66)  BP(mean): 90 (07-06-25 @ 11:44) (78 - 90)  RR: 18 (07-06-25 @ 11:44) (18 - 18)  SpO2: 98% (07-06-25 @ 11:44) (95% - 98%)  I&O's Summary    05 Jul 2025 07:01  -  06 Jul 2025 07:00  --------------------------------------------------------  IN: 0 mL / OUT: 1350 mL / NET: -1350 mL    06 Jul 2025 07:01  -  06 Jul 2025 14:01  --------------------------------------------------------  IN: 0 mL / OUT: 400 mL / NET: -400 mL        PHYSICAL EXAM:  General: NAD  HEENT: PERRL, EOM intact, sclera anicteric, MMM  Cardiovascular: RRR; no MRG;   Respiratory: CTAB; no WRR  GI/: soft; NTND; BS x4  Extremities: WWP; 2+ peripheral pulses bilaterally; no LE edema  Skin: normal color & turgor; no rash  Neurologic: aox3; no focal deficits    LABS:                        9.8    11.98 )-----------( 294      ( 06 Jul 2025 06:45 )             34.9     07-06    152[H]  |  123[H]  |  82[H]  ----------------------------<  160[H]  3.8   |  21[L]  |  1.10    Ca    8.0[L]      06 Jul 2025 06:45  Phos  2.6     07-06  Mg     2.3     07-06    TPro  6.9  /  Alb  2.3[L]  /  TBili  0.4  /  DBili  x   /  AST  20  /  ALT  12  /  AlkPhos  53  07-06    PT/INR - ( 05 Jul 2025 04:38 )   PT: 13.0 sec;   INR: 1.11          PTT - ( 05 Jul 2025 04:38 )  PTT:35.7 sec  Urinalysis Basic - ( 06 Jul 2025 06:45 )    Color: x / Appearance: x / SG: x / pH: x  Gluc: 160 mg/dL / Ketone: x  / Bili: x / Urobili: x   Blood: x / Protein: x / Nitrite: x   Leuk Esterase: x / RBC: x / WBC x   Sq Epi: x / Non Sq Epi: x / Bacteria: x          RADIOLOGY & ADDITIONAL TESTS:    MEDICATIONS  (STANDING):  dextrose 5%. 1000 milliLiter(s) (175 mL/Hr) IV Continuous <Continuous>  dextrose 5%. 1000 milliLiter(s) (100 mL/Hr) IV Continuous <Continuous>  dextrose 5%. 1000 milliLiter(s) (50 mL/Hr) IV Continuous <Continuous>  dextrose 50% Injectable 25 Gram(s) IV Push once  dextrose 50% Injectable 12.5 Gram(s) IV Push once  dextrose 50% Injectable 25 Gram(s) IV Push once  enoxaparin Injectable 40 milliGRAM(s) SubCutaneous every 24 hours  glucagon  Injectable 1 milliGRAM(s) IntraMuscular once  polyethylene glycol 3350 17 Gram(s) Oral every 12 hours  senna 2 Tablet(s) Oral at bedtime    MEDICATIONS  (PRN):  acetaminophen     Tablet .. 650 milliGRAM(s) Oral every 6 hours PRN Temp greater or equal to 38C (100.4F), Mild Pain (1 - 3)  aluminum hydroxide/magnesium hydroxide/simethicone Suspension 30 milliLiter(s) Oral every 4 hours PRN Dyspepsia  benzonatate 100 milliGRAM(s) Oral three times a day PRN Cough  dextrose Oral Gel 15 Gram(s) Oral once PRN Blood Glucose LESS THAN 70 milliGRAM(s)/deciliter  melatonin 3 milliGRAM(s) Oral at bedtime PRN Insomnia

## 2025-07-06 NOTE — PROGRESS NOTE ADULT - PROBLEM SELECTOR PLAN 8
Daughter reports the patient had a brain mass that was removed in 2015 Patient has a history of dementia. A&Ox1 to person only, but daughter states that this is the baseline and denies any deterioration and confusion.    - CTM

## 2025-07-06 NOTE — PROGRESS NOTE ADULT - PROBLEM SELECTOR PLAN 6
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 She was deemed as not a candidate for immunotherapy or surgery. She was started on chemotherapy and underwent 3 cycles, last one on 3-June 2025.  Per daughter, patient missed her cycle on Betina-15 due to difficulty ambulation.  She follows up with Dr. Poly Keith (med onc), Dr. Jesse Villeda (rad onc), and Dr. Mauri Arroyo (breast surgeon)    - Palliative consulted, f/u recs  - Dr. Keith and Dr. Arroyo were notified via email

## 2025-07-06 NOTE — PROGRESS NOTE ADULT - PROBLEM SELECTOR PLAN 3
Patient has a history of CKD stage 3a and hyponatremia (possibly 2/2 SIADH per outpatient nephro note).  Sodium was 157 on admission, went up to 165 after the administration of 2L of NS.  Patient has 4.3L free water deficit    - 175ml/hr D5  - repeat BMP to trend sodium q8  - bladder scan for PVR q6h Patient's daughter started noticing blood in the stool for the past 4 days while changing her diapers. Initially, GI bleed was suspected, but CT angio of abdomen/pelvis was normal and patient's hgb was 12.1 but on examination patient has an oozing sacral decubitus ulcer on examination. Patient has been having difficulty walking due to leg swelling and has been bed bound recently.  Bleeding is likely 2/2 sacral decubitus ulcer.    - wound care consulted  - HSV swab PND  - DVT ppx resumed

## 2025-07-06 NOTE — PROGRESS NOTE ADULT - SUBJECTIVE AND OBJECTIVE BOX
Neurology Progress Note     RODRIGO VILLALTA 89y Female [id number]    Hospital Day: 1d     Interval events:  Pt evaluated at bedside. Pt not communicating verbally      Vital Signs  T(F): 97.6 (06:02), Max: 98.6 (00:50)  HR: 85 (06:02) (85 - 92)  BP: 132/50 (06:02) (96/58 - 145/98)  RR: 18 (06:02) (18 - 18)  SpO2: 95% (06:02) (95% - 100%)    Neurological Exam:   Mental status: Waxing and waning alertness/lethargy- hypophonic, dysarthric and nasally voice, attempts to repeat some sounds, occasionally successful otherwise nonverbal, nods yes to "do you understand" but does not always answer/answer appropriately. When sleeping, observed to be snoring.   Cranial nerves:   - Eyes: EOMI without nystagmus, Visual fields full   - Face: BL V1-V3 sensation intact symmetrically, face symmetric   - ENT: Hearing intact to voice, palate elevation symmetric, tongue was midline  Motor: 4/5 b/l shoulder, 4+/5 wrist flexion/extension, R hand  slightly weaker than left, 3/5 hip flexion/extension b/l but drift noted in RLE, 2/5 R dorsiflexion/plantarflexion on the R, 3/5 on the left, flaccid tone +paratonia. Able to raise head, but unable to properly assess NF/E  Sensation: Unable to assess   Coordination: Unable to assess.  Reflexes: 2+ in bilateral UE/LE - brisk   Gait: Deferred      Labs:  WBC 11.98 /HGB 9.8 /MCV 98.9 /HCT 34.9 / / 07-06  WBC 10.30 /HGB 12.1 /MCV 97.7 /HCT 42.3 / / 07-05    07-06    152[H]  |  123[H]  |  82[H]  ----------------------------<  160[H]  3.8   |  21[L]  |  1.10    Ca    8.0[L]      06 Jul 2025 06:45  Phos  2.6     07-06  Mg     2.3     07-06    TPro  6.9  /  Alb  2.3[L]  /  TBili  0.4  /  DBili  x   /  AST  20  /  ALT  12  /  AlkPhos  53  07-06    LIVER FUNCTIONS - ( 06 Jul 2025 06:45 )  Alb: 2.3 g/dL / Pro: 6.9 g/dL / ALK PHOS: 53 U/L / ALT: 12 U/L / AST: 20 U/L / GGT: x           PT/INR - ( 05 Jul 2025 04:38 )   PT: 13.0 sec;   INR: 1.11          PTT - ( 05 Jul 2025 04:38 )  PTT:35.7 sec  Urinalysis Basic - ( 06 Jul 2025 06:45 )    Color: x / Appearance: x / SG: x / pH: x  Gluc: 160 mg/dL / Ketone: x  / Bili: x / Urobili: x   Blood: x / Protein: x / Nitrite: x   Leuk Esterase: x / RBC: x / WBC x   Sq Epi: x / Non Sq Epi: x / Bacteria: x        Medications:  acetaminophen     Tablet .. 650 milliGRAM(s) Oral every 6 hours PRN  aluminum hydroxide/magnesium hydroxide/simethicone Suspension 30 milliLiter(s) Oral every 4 hours PRN  benzonatate 100 milliGRAM(s) Oral three times a day PRN  dextrose 5%. 500 milliLiter(s) IV Continuous <Continuous>  dextrose 5%. 1000 milliLiter(s) IV Continuous <Continuous>  dextrose 5%. 1000 milliLiter(s) IV Continuous <Continuous>  dextrose 5%. 1000 milliLiter(s) IV Continuous <Continuous>  dextrose 50% Injectable 25 Gram(s) IV Push once  dextrose 50% Injectable 12.5 Gram(s) IV Push once  dextrose 50% Injectable 25 Gram(s) IV Push once  dextrose Oral Gel 15 Gram(s) Oral once PRN  glucagon  Injectable 1 milliGRAM(s) IntraMuscular once  melatonin 3 milliGRAM(s) Oral at bedtime PRN      Neuroimaging:  CT Head No Cont:   ACC: [id number] EXAM:  CT BRAIN   ORDERED BY: CAIN GAINES     PROCEDURE DATE:  07/06/2025          INTERPRETATION:  CLINICAL INFORMATION: Dysarthria. Right facial droop.    COMPARISON: MRI brain 9/2/2016. CT head 5/29/2015. Additional prior   studies dating back to 3/21/2014.    CONTRAST:  IV Contrast: NONE  .    TECHNIQUE:  Serial axial images were obtained from the skull base to the   vertex using multi-slice helical technique. Sagittal and coronal   reformats were obtained.    FINDINGS:    Somewhat limited evaluation due to motion.    VENTRICLES AND SULCI: Age appropriate involutional changes.  INTRA-AXIAL: No mass effect, acute hemorrhage, or midline shift.  There   are periventricular and subcortical white matter hypodensities,   consistent with moderate microvascular type changes.  EXTRA-AXIAL: No mass or fluid collection. Basal cisterns are normal in   appearance.    VISUALIZED SINUSES:  Clear.  TYMPANOMASTOID CAVITIES:  Clear.  VISUALIZED ORBITS: Normal.  CALVARIUM: Left frontal craniotomy. No acute fracture.      IMPRESSION:  No evidence of acute intracranial pathology.    Consider MRI as clinically warranted.    --- End of Report ---          CARY ARAIZA MD; Resident Radiologist  This document has been electronically signed.  CATHY DAY MD; Attending Radiologist  This document has been electronically signed. Jul 6 2025 10:27AM (07-06-25 @ 09:36)      PERTINENT HISTORICAL RESULTS:

## 2025-07-06 NOTE — PROGRESS NOTE ADULT - PROBLEM SELECTOR PLAN 4
Patient's daughter was told that the patient has CKD but patient never experienced any problems with urination and has appropriate urine output.  On admission: Cr 1.59, , eGFR 30    - manage hypernatremia and bladder scans as above Patient has a history of CKD stage 3a and hyponatremia (possibly 2/2 SIADH per outpatient nephro note).  Sodium was 157 on admission, went up to 165 after the administration of 2L of NS.  Patient has 4.3L free water deficit    - 175ml/hr D5  - repeat BMP to trend sodium q8  - bladder scan for PVR q6h

## 2025-07-06 NOTE — PROGRESS NOTE ADULT - PROBLEM SELECTOR PLAN 1
- bladder scan with 380cc x2, straight cathed   - possible etiology: constipation, KUB with increased stool burden, miralax and senna ordered  - if failed x3, consider need for pinzon - new onset subjective weakness   - non-focal, neuro following   - recs for MRI with/without contrast brain and L spine   - CK, Myasthenia Gravis/Lambert-Eaton Myasthenic Syndrome Evaluation, Acetylcholine Modulating AB, Acetylcholine Blocking, LRP4 Autoantibody Test, Musk antibody, Paraneoplastic panel, ganglioside Ab ordered, PND   - NIF/VC ordered PND

## 2025-07-06 NOTE — PROGRESS NOTE ADULT - ASSESSMENT
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 herpetic) and found to have FTT with hyponatremia. At baseline, patient was walking 3 weeks ago and symptoms progressively worsening since last chemotherapy session. On admission, patient severely hypernatremic to 160.  Neurology consulted for worsening LE weakness and AMS. On exam, patient is severely dysarthric, slightly decreased  strength RUE compared to LUE, and decreased strength b/l LE (3/5) with drift in the RLE compared to left. Given presentation and exam, differentials include TME given severe hypernatremia and dehydration vs possible sub-acute stroke vs intracerebral pathology (tumor, mets..) vs medication-induced neuropathy (from methotrexate or 5-FU).    Recommendations:  - obtain MRI brain w/wo contrast and MRI L-spine w/wo contrast  - Obtain CK, Myasthenia Gravis/Lambert-Eaton Myasthenic Syndrome Evaluation, Acetylcholine Modulating AB, Acetylcholine Blocking, LRP4 Autoantibody Test, Musk antibody, Paraneoplastic panel, ganglioside Ab  - Obtain NIF/VC for baseline  - treat underlying TME  - rest per primary team

## 2025-07-06 NOTE — PHYSICAL THERAPY INITIAL EVALUATION ADULT - MANUAL MUSCLE TESTING RESULTS, REHAB EVAL
right  3/5, elbow flex and shoulder flex 2-/5, left UE shoulder flex 3-/5, elbow flex and  3/5, left knee ext 3/5, right knee ext 3-/5

## 2025-07-06 NOTE — CONSULT NOTE ADULT - SUBJECTIVE AND OBJECTIVE BOX
NEPHROLOGY SERVICE INITIAL CONSULT NOTE    HPI:  89y F with pmh dementia, metastatic breast cancer, hypertension, stage 3a chronic kidney disease, hyperlipidemia, cerebral meningioma, hyponatremia, and vitamin D deficiency brought in by EMS for rectal bleeding.  The patient's daughter reports noticing blood (maroon and dark in nature) in the stool for the past 4 days and denies associated nausea, vomiting, or diarrhea. The daughter reports that the patient had an ulcer on her lower back for 2 weeks and that she occasionally scratches it. The patient hasn't been eating for the past week and has been given ensure and soup by he daughter.  The patient was started on CMF chemotherapy regimen received 3 cycles of treatment  with the last one being on Betina-3-2025. She was scheduled for her next cycle on Betina 15 but was unable to attend to it as she has been having difficulty ambulating and became bedbound since that time,  She had rectal bleeding in  and underwent colonoscopy which showed multiple polyps, all of which were removed.  She follows up with Dr. Tyson Schumacher (PCP), Dr. Poly Keith (med onc), Dr. Jesse Villeda (rad onc), and Dr. Mauri Arroyo (breast surgeon)      In the ED:  Vitals: T 97.9, BP 90/61, HR 96, RR 20, SpO2 98% (RA)  Labs: MCHC 28.6 (H), smudge cells, lactate 2.4, sodium 157, , Cr 1.59, eGFR 30, glucose 124, protein total 9.4 , albumin 2.7, Mg 3.1  Urine: cloudy, protein 30, RBC 99, WBC 7 , moderate leukocyte esterase, few bacteria  CXR: normal  Imaging: CT scan showed no evidence of active gastrointestinal bleeding. Partially visualized left breast mass largest measuring up to 4.7cm, and increase in size  2L fluids (2025 09:01)        REVIEW OF SYSTEMS: Otherwise negative except as specified in HPI  PAST MEDICAL & SURGICAL HISTORY:  Alzheimer's disease  Alzheimer's dementia      Hyperlipidemia  Hyperlipidemia      Essential hypertension  HTN (hypertension)      Benign neoplasm of cerebral meninges  Left frontal lobe      Breast cancer        FAMILY HISTORY:    SOCIAL HISTORY:  HOME MEDICATIONS:    Allergies    No Known Allergies    Intolerances        ACTIVE MEDICATIONS:  MEDICATIONS  (STANDING):  dextrose 5%. 1000 milliLiter(s) (175 mL/Hr) IV Continuous <Continuous>  dextrose 5%. 1000 milliLiter(s) (100 mL/Hr) IV Continuous <Continuous>  dextrose 5%. 1000 milliLiter(s) (50 mL/Hr) IV Continuous <Continuous>  dextrose 50% Injectable 25 Gram(s) IV Push once  dextrose 50% Injectable 12.5 Gram(s) IV Push once  dextrose 50% Injectable 25 Gram(s) IV Push once  enoxaparin Injectable 40 milliGRAM(s) SubCutaneous every 24 hours  glucagon  Injectable 1 milliGRAM(s) IntraMuscular once  polyethylene glycol 3350 17 Gram(s) Oral every 12 hours  senna 2 Tablet(s) Oral at bedtime    MEDICATIONS  (PRN):  acetaminophen     Tablet .. 650 milliGRAM(s) Oral every 6 hours PRN Temp greater or equal to 38C (100.4F), Mild Pain (1 - 3)  aluminum hydroxide/magnesium hydroxide/simethicone Suspension 30 milliLiter(s) Oral every 4 hours PRN Dyspepsia  benzonatate 100 milliGRAM(s) Oral three times a day PRN Cough  dextrose Oral Gel 15 Gram(s) Oral once PRN Blood Glucose LESS THAN 70 milliGRAM(s)/deciliter  melatonin 3 milliGRAM(s) Oral at bedtime PRN Insomnia        VITAL SIGNS:  Vital Signs Last 24 Hrs  T(C): 37 (2025 11:44), Max: 37 (2025 00:50)  T(F): 98.6 (2025 11:44), Max: 98.6 (2025 00:50)  HR: 92 (2025 11:44) (85 - 92)  BP: 138/66 (2025 11:44) (96/58 - 145/98)  BP(mean): 90 (2025 11:44) (70 - 113)  RR: 18 (2025 11:44) (18 - 18)  SpO2: 98% (:44) (95% - 100%)    Parameters below as of :44  Patient On (Oxygen Delivery Method): room air        25 @ 07:01  -  25 @ 07:00  --------------------------------------------------------  IN:  Total IN: 0 mL    OUT:    Intermittent Catheterization - Urethral (mL): 550 mL    Voided (mL): 800 mL  Total OUT: 1350 mL    Total NET: -1350 mL      25 @ 07:01  -  25 @ 14:50  --------------------------------------------------------  IN:  Total IN: 0 mL    OUT:    Intermittent Catheterization - Urethral (mL): 400 mL  Total OUT: 400 mL    Total NET: -400 mL          PE:  General: Not in acute distress   Chest: CTAP b/l, no use of accessory respiratory muscles  Heart: RRR, S1/S2 wnl, no MRG  Abdomen: Soft, nontender  Extremities: No edema  Neuro:  Alert, no apparent focal deficits       Pertinent labs & Imagin.8    11.98 )-----------( 294      ( 2025 06:45 )             34.9     07-06    152[H]  |  123[H]  |  82[H]  ----------------------------<  160[H]  3.8   |  21[L]  |  1.10    Ca    8.0[L]      2025 06:45  Phos  2.6     07-06  Mg     2.3     07-06    TPro  6.9  /  Alb  2.3[L]  /  TBili  0.4  /  DBili  x   /  AST  20  /  ALT  12  /  AlkPhos  53  07-06    PT/INR - ( 2025 04:38 )   PT: 13.0 sec;   INR: 1.11          PTT - ( 2025 04:38 )  PTT:35.7 sec  Urinalysis Basic - ( 2025 06:45 )    Color: x / Appearance: x / SG: x / pH: x  Gluc: 160 mg/dL / Ketone: x  / Bili: x / Urobili: x   Blood: x / Protein: x / Nitrite: x   Leuk Esterase: x / RBC: x / WBC x   Sq Epi: x / Non Sq Epi: x / Bacteria: x          CAPILLARY BLOOD GLUCOSE      POCT Blood Glucose.: 170 mg/dL (2025 17:57)          RADIOLOGY & ADDITIONAL TESTS: Reviewed.

## 2025-07-06 NOTE — PROGRESS NOTE ADULT - PROBLEM SELECTOR PLAN 7
Patient has a history of dementia. A&Ox1 to person only, but daughter states that this is the baseline and denies any deterioration and confusion.    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    - CTM

## 2025-07-06 NOTE — PROGRESS NOTE ADULT - PROBLEM SELECTOR PLAN 9
Patient has a history of left frontal meningioma that was resected in 2015    Patient has a history of hypertension. She was prescribed valsartan 40mg per surescripts but hasn't been taking it for weeks per daughter.  Blood pressure on admission was 90/61 went up to 137/58 after fluid bolus in the ED.    - hold home valsartan Daughter reports the patient had a brain mass that was removed in 2015

## 2025-07-06 NOTE — PROGRESS NOTE ADULT - PROBLEM SELECTOR PROBLEM 11
O neg requested per MD. Received from blood bank, starting rapid infusion now. Prophylactic measure Hyperlipidemia

## 2025-07-06 NOTE — PROGRESS NOTE ADULT - PROBLEM SELECTOR PLAN 5
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 She was deemed as not a candidate for immunotherapy or surgery. She was started on chemotherapy and underwent 3 cycles, last one on 3-June 2025.  Per daughter, patient missed her cycle on Betina-15 due to difficulty ambulation.  She follows up with Dr. Poly Keith (med onc), Dr. Jesse Villeda (rad onc), and Dr. Mauri Arroyo (breast surgeon)    - Palliative consulted, f/u recs  - Dr. Keith and Dr. Arroyo were notified via email Patient's daughter was told that the patient has CKD but patient never experienced any problems with urination and has appropriate urine output.  On admission: Cr 1.59, , eGFR 30    - manage hypernatremia and bladder scans as above

## 2025-07-06 NOTE — PROGRESS NOTE ADULT - ASSESSMENT
89y F with pmh Alzheimer's dementia, metastatic breast cancer, hypertension, stage 3a chronic kidney disease, hyperlipidemia, cerebral meningioma, hyponatremia, and vitamin D deficiency brought in by EMS for rectal bleeding.

## 2025-07-06 NOTE — PROGRESS NOTE ADULT - PROBLEM SELECTOR PLAN 10
Patient has a history of hyperlipidemia. She was prescribed atorvastatin 10mg per surescripts  but hasn't been taking it for weeks per daughter.    - hold home meds Patient has a history of left frontal meningioma that was resected in 2015    Patient has a history of hypertension. She was prescribed valsartan 40mg per surescripts but hasn't been taking it for weeks per daughter.  Blood pressure on admission was 90/61 went up to 137/58 after fluid bolus in the ED.    - hold home valsartan

## 2025-07-06 NOTE — PROGRESS NOTE ADULT - ATTENDING COMMENTS
89y F Shanghainese/Cantonese/limited English, b/l  hearing impairment (better hearing on left ear), with PMHx of Alzheimer's dementia without behavioral disturbance, AAOx1, partial ADL's, gait disturbance (RW assist, was able to walk 3 weeks prior to admission now bed bound for 5 days),hx left frontal craniotomy for cerebral meningioma ( 2015), Obesity (BMI 29.7), HTN, HLD, CKD3a, hx colonic polyps, moderate internal hemorrhoid and rectal prolapse (2021), hx hyponatremia 2/2 SIADH, recently diagnosed stage IIIc (T3N2Mo) triple negative ( ER-/NV-/HER2-) intraductal carcinoma( IDC) of left breast ~ 4.7cm ( 2/2025) followed by Dr.Paul Arroyo/Dr.Bonnie Keith receiving neoadjuvant chemoRx w/ CMF ( cyclophosphamide/methotrexate/5-FU started 4/15/25, s/p cycle 3 on 6/3 out of 8 cycles) now BIBEMS for rectal bleeding likely 2/2 sacral ulcer (? herpetic looking) and found to have ME/FTT with hyponatremia, RAFFI on CKD3a and functional quadriplegia Vs gait abnormality /bed bound for 5 days.   AAOx1 conversant in Cantonese NAD Heart RRR normal S1 S2,Lungs clear Abd soft ND/NT, Ext: no edema, chronic skin changes, Rectal: rectal fissure laceration and a 2-3 rectal ulcers~ 1cm, neuro non-focal  Labs/imaging reviewed     # rectal bleed  -CTA abd negative, doubt LGIB/diverticulosis bleed, Hgb normal 12-> 9.8( hemodilution). likely bleeding from the rectal ulcer doubt sacral decubitus, to rule out HSV/genital ulcers, local wound care w/ foam dressing, WOCN consult, HSV wound culture. If positive HSV, will treat with Valtrex 1g PO bid for 7 days     # Hx Hyponatremia/SIADH  # Hypernatremia  # RAFFI ( likely 2/2 prerenal azotemia Vs ischemic ATN) on CKD3a  # acute urinary retention likely 2/2 constipation( moderate stool burden seen on CT)   - Renal consult and f/u appreciated   - FWD 4.3L, c/w D5W repeat BMP q8h  - SNa baseline 133( 4/15/25)-> 157 ( ED 7/5)-> 165 peaked -> 157 -> 152  - SCr baseline 0.7( 4/15/25)-> 1.61 (ED)-> 1.61 peaked -> 1.21-> 1.1  - PVR 550ml and 400ml s/p strainght cath x2 , SC for PVR>300ml, will need pinzon cath if failed SC x3   - bowel regimen for constipation; Senna 2 tabs qHS and miralax 17g PO daily , monitor BM    # FTT likely related to chemoRx   # Protein Calorie malnutrition (albumin 2.6-2.7)   - Nutrition consult   - Ensure max protein (Vanilla) bid     # Deconditioning   # Functional quadriplegia   # gait abnormality to rule out Brain mets Vs stroke Vs medical induced neuropathy   - Neuro consult appreciated   - CTH negative( 7/6)  - MRI brain w/wo contrast and MRI lumber spine w/wo contrast ordered   - Obtain CK, Myasthenia Gravis/Lambert-Eaton Myasthenic Syndrome Evaluation, Acetylcholine Modulating AB, Acetylcholine Blocking, LRP4 Autoantibody Test, Musk antibody, Paraneoplastic panel, ganglioside Ab  - Obtain NIF/VC for baseline  - Fall precautions   - PT eval rec: JEREMY, will d/w daughter Yoli    # Stage IIIc/T3N2/Mo triple negative IDC L breast ( TNBC)  - undergoing neoadjuvant chemo; CMF s/p 3 cycles 4/15, 5/13 and 6/3   - Breast surgeon Dr. Mauri arroyo, Med Onc Dr. Poly Keith ( informed via emails)     # Alzheimer's dementia wo behavioral disturbance  # ? ME   - AAOx1  - SLP eval appreciated rec: puree w/ thin   - delirium precaution     # DVT ppx:  Lovenox     # Advance directives: DNR/DNI ( MOLST form filled out) GOC as d/w daughter/Yoli, no heroic measures i.e. CPR or intubation in an event required resuscitation. Consider Palliative care consult , will inform Dr. Arroyo and Dr. Keith about admission     Contacts:  legal guardian( daughter): Bozena (Yoli) Tomasa [phone]  PMD Dr.Daniel Schumacher  Breast Surgeon: Dr.Paul Arroyo   Med Onc Dr. Poly Keith   Rad Onc Dr. Jesse Villeda     POC as d/w 7UR2 team    Time-based billing (NON-critical care).     51 minutes spent on total encounter. The necessity of the time spent during the encounter on this date of service was due to:     Time spent on this encounter including   reviewed HIE for collaterals, chart, vitals,labs, imaging   interpretation and documentation  discussion with caregiver and housestaff   excluded teaching time, separately billed services. 89y F Shanghainese/Cantonese/limited English, b/l  hearing impairment (better hearing on left ear), with PMHx of Alzheimer's dementia without behavioral disturbance, AAOx1, partial ADL's, gait disturbance (RW assist, was able to walk 3 weeks prior to admission now bed bound for 5 days),hx left frontal craniotomy for cerebral meningioma ( 2015), Obesity (BMI 29.7), HTN, HLD, CKD3a, hx colonic polyps, moderate internal hemorrhoid and rectal prolapse (2021), hx hyponatremia 2/2 SIADH, recently diagnosed stage IIIc (T3N2Mo) triple negative ( ER-/ME-/HER2-) intraductal carcinoma( IDC) of left breast ~ 4.7cm ( 2/2025) followed by Dr.Paul Arroyo/Dr.Bonnie Keith receiving neoadjuvant chemoRx w/ CMF ( cyclophosphamide/methotrexate/5-FU started 4/15/25, s/p cycle 3 on 6/3 out of 8 cycles) now BIBEMS for rectal bleeding likely 2/2 sacral ulcer (? herpetic looking) and found to have ME/FTT with hyponatremia, RAFFI on CKD3a and functional quadriplegia Vs gait abnormality /bed bound for 5 days.   AAOx1 conversant in Cantonese NAD Heart RRR normal S1 S2,Lungs clear Abd soft ND/NT, Ext: no edema, chronic skin changes, Rectal: rectal fissure laceration and a 2-3 rectal ulcers~ 1cm, neuro non-focal  Labs/imaging reviewed     # rectal bleed  -CTA abd negative, doubt LGIB/diverticulosis bleed, Hgb normal 12-> 9.8( hemodilution). likely bleeding from the rectal ulcer doubt sacral decubitus, to rule out HSV/genital ulcers, local wound care w/ foam dressing, WOCN consult, HSV wound culture. If positive HSV, will treat with Valtrex 1g PO bid for 7 days     # Hx Hyponatremia/SIADH  # Hypernatremia  # RAFFI ( likely 2/2 prerenal azotemia Vs ischemic ATN) on CKD3a  # acute urinary retention likely 2/2 constipation( moderate stool burden seen on CT)   - Renal consult and f/u appreciated   - FWD 4.3L, c/w D5W repeat BMP q8h  - SNa baseline 133( 4/15/25)-> 157 ( ED 7/5)-> 165 peaked -> 157 -> 152  - SCr baseline 0.7( 4/15/25)-> 1.61 (ED)-> 1.61 peaked -> 1.21-> 1.1  - PVR 550ml and 400ml s/p strainght cath x2 , SC for PVR>300ml, will need pinzon cath if failed SC x3   - bowel regimen for constipation; Senna 2 tabs qHS and miralax 17g PO daily , monitor BM    # FTT likely related to chemoRx   # Protein Calorie malnutrition (albumin 2.6-2.7)   - Nutrition consult   - Ensure max protein (Vanilla) bid     # Deconditioning   # Functional quadriplegia   # gait abnormality to rule out Brain mets Vs stroke Vs medical induced neuropathy   - Neuro consult appreciated   - CTH negative( 7/6)  - MRI brain w/wo contrast and MRI lumber spine w/wo contrast ordered   - Obtain CK, Myasthenia Gravis/Lambert-Eaton Myasthenic Syndrome Evaluation, Acetylcholine Modulating AB, Acetylcholine Blocking, LRP4 Autoantibody Test, Musk antibody, Paraneoplastic panel, ganglioside Ab  - Obtain NIF/VC for baseline  - Fall precautions   - PT eval rec: JEREMY, will d/w daughter Yoli    # Stage IIIc/T3N2/Mo triple negative IDC L breast ( TNBC)  - undergoing neoadjuvant chemo; CMF s/p 3 cycles 4/15, 5/13 and 6/3   - Breast surgeon Dr. Mauri arroyo, Med Onc Dr. Poly Keith ( informed via emails)     # Alzheimer's dementia wo behavioral disturbance  # ? ME   - AAOx1  - SLP eval appreciated rec: puree w/ thin   - delirium precaution     # DVT ppx:  Lovenox     # Advance directives: DNR/DNI ( MOLST form filled out) GOC as d/w daughter/Yoli, no heroic measures i.e. CPR or intubation in an event required resuscitation. Consider Palliative care consult , will inform Dr. Arroyo and Dr. Keith about admission     Contacts:  legal guardian( daughter): Bozena (Yoli) Tomasa [phone]  PMD Dr.Daniel Scuhmacher  Breast Surgeon: Dr.Paul Arroyo   Med Onc Dr. Poly Keith   Rad Onc Dr. Jesse Villeda     POC as d/w 7UR2 team    Time-based billing (NON-critical care).     51 minutes spent on total encounter. The necessity of the time spent during the encounter on this date of service was due to:     Time spent on this encounter including   reviewed HIE for collaterals, chart, vitals,labs, imaging   interpretation and documentation  discussion with caregiver and housestaff   excluded teaching time, separately billed services.

## 2025-07-06 NOTE — CONSULT NOTE ADULT - ASSESSMENT
89y F with PMH of Alzheimer's dementia, HTN,, CKD3 admitted for sacral wound bleeding and generalized weakness (7/5). Nephrology consulted for hypernatremia    1–Acute on chronic hypernatremia:  No associated polyuria or diarrhea  On admission (7/5 at 5 AM): SNa 157  When consulted (7/5 at 18:00): 164  Estimated free water deficit: 4L to reach 152  SNa improved, most recent labs (7/6 at 7 AM): 152  Target SNa 140 by tomorrow 5 AM  –Continue dextrose 5% at rate of 175 mL/h for the next 24 hours  –BMP every 6 hours    2–Nonoliguric RAFFI:  DDx include hemodynamic RAFFI (prerenal azotemia or ischemic ATN), obstructive uropathy as patient is requiring multiple straight caths  On admission (7/5): SCr 1.59,   Improved renal function, most recent labs (7/6): SCr 1.1, BUN 82  –Consider urinary indwelling catheter  –Strict I/O monitoring  –BMP every 6 hours    Thank you for consulting Nephrology.    Cayetano Manzano MD  PGY-5 Nephrology Fellow

## 2025-07-06 NOTE — PHYSICAL THERAPY INITIAL EVALUATION ADULT - ADDITIONAL COMMENTS
Patient amb without a device at home. Has a rollator and RW however does not use it. Was able to negotiate stairs with assist.

## 2025-07-06 NOTE — PHYSICAL THERAPY INITIAL EVALUATION ADULT - PERTINENT HX OF CURRENT PROBLEM, REHAB EVAL
89 year old female BIBEMS for rectal bleeding likely 2/2 sacral ulcer (? herpetic) and found to have FTT with hyponatremia. At baseline, patient was walking 3 weeks ago and symptoms progressively worsening since last chemotherapy session. On admission, patient severely hypernatremic to 160. Neurology consulted for worsening LE weakness and AMS. On exam, patient is severely dysarthric, slightly decreased  strength RUE compared to LUE, and decreased strength b/l LE (3/5) with drift in the RLE compared to left. Given presentation and exam, differentials include TME given severe hypernatremia and dehydration vs possible sub-acute stroke vs intracerebral pathology (tumor, mets..) vs medication-induced neuropathy.

## 2025-07-06 NOTE — PROGRESS NOTE ADULT - PROBLEM SELECTOR PLAN 11
F: tolerating PO, no IVF  E: replete K<4, Mg<2  N: NPO    VTE Prophylaxis: None  GI: miralax  C: DNR/DNI  D: RMF Patient has a history of hyperlipidemia. She was prescribed atorvastatin 10mg per surescripts  but hasn't been taking it for weeks per daughter.    - hold home meds

## 2025-07-06 NOTE — PROGRESS NOTE ADULT - PROBLEM SELECTOR PLAN 12
F: tolerating PO, no IVF  E: replete K<4, Mg<2  N: NPO    VTE Prophylaxis: None  GI: miralax  C: DNR/DNI  D: RMF

## 2025-07-06 NOTE — PROGRESS NOTE ADULT - ATTENDING COMMENTS
severe neurological dysfunction, subacute on chronic worsening which could localize anywhere in the nervous system based on presenting signs and symptoms    - obtain MRI brain w/wo contrast and MRI L-spine w/wo contrast  - Obtain CK, Myasthenia Gravis/Lambert-Eaton Myasthenic Syndrome Evaluation, Acetylcholine Modulating AB, Acetylcholine Blocking, LRP4 Autoantibody Test, Musk antibody, Paraneoplastic panel, ganglioside Ab  - Obtain NIF/VC for baseline  - treat underlying TME  - rest per primary team    I spent 40 minutes on her care today

## 2025-07-07 DIAGNOSIS — I50.9 HEART FAILURE, UNSPECIFIED: ICD-10-CM

## 2025-07-07 DIAGNOSIS — Z51.5 ENCOUNTER FOR PALLIATIVE CARE: ICD-10-CM

## 2025-07-07 DIAGNOSIS — Z71.89 OTHER SPECIFIED COUNSELING: ICD-10-CM

## 2025-07-07 DIAGNOSIS — E43 UNSPECIFIED SEVERE PROTEIN-CALORIE MALNUTRITION: ICD-10-CM

## 2025-07-07 DIAGNOSIS — K59.00 CONSTIPATION, UNSPECIFIED: ICD-10-CM

## 2025-07-07 DIAGNOSIS — B00.9 HERPESVIRAL INFECTION, UNSPECIFIED: ICD-10-CM

## 2025-07-07 LAB
ALBUMIN SERPL ELPH-MCNC: 1.9 G/DL — LOW (ref 3.3–5)
ALP SERPL-CCNC: 57 U/L — SIGNIFICANT CHANGE UP (ref 40–120)
ALT FLD-CCNC: 11 U/L — SIGNIFICANT CHANGE UP (ref 10–45)
ANION GAP SERPL CALC-SCNC: 9 MMOL/L — SIGNIFICANT CHANGE UP (ref 5–17)
AST SERPL-CCNC: 17 U/L — SIGNIFICANT CHANGE UP (ref 10–40)
BASOPHILS # BLD AUTO: 0.04 K/UL — SIGNIFICANT CHANGE UP (ref 0–0.2)
BASOPHILS NFR BLD AUTO: 0.4 % — SIGNIFICANT CHANGE UP (ref 0–2)
BILIRUB SERPL-MCNC: 0.3 MG/DL — SIGNIFICANT CHANGE UP (ref 0.2–1.2)
BUN SERPL-MCNC: 34 MG/DL — HIGH (ref 7–23)
CALCIUM SERPL-MCNC: 7.6 MG/DL — LOW (ref 8.4–10.5)
CHLORIDE SERPL-SCNC: 112 MMOL/L — HIGH (ref 96–108)
CO2 SERPL-SCNC: 21 MMOL/L — LOW (ref 22–31)
CREAT SERPL-MCNC: 0.87 MG/DL — SIGNIFICANT CHANGE UP (ref 0.5–1.3)
EGFR: 63 ML/MIN/1.73M2 — SIGNIFICANT CHANGE UP
EGFR: 63 ML/MIN/1.73M2 — SIGNIFICANT CHANGE UP
EOSINOPHIL # BLD AUTO: 0.61 K/UL — HIGH (ref 0–0.5)
EOSINOPHIL NFR BLD AUTO: 6.1 % — HIGH (ref 0–6)
GLUCOSE SERPL-MCNC: 134 MG/DL — HIGH (ref 70–99)
HCT VFR BLD CALC: 31 % — LOW (ref 34.5–45)
HGB BLD-MCNC: 9.1 G/DL — LOW (ref 11.5–15.5)
IMM GRANULOCYTES # BLD AUTO: 0.06 K/UL — SIGNIFICANT CHANGE UP (ref 0–0.07)
IMM GRANULOCYTES NFR BLD AUTO: 0.6 % — SIGNIFICANT CHANGE UP (ref 0–0.9)
LYMPHOCYTES # BLD AUTO: 1.11 K/UL — SIGNIFICANT CHANGE UP (ref 1–3.3)
LYMPHOCYTES NFR BLD AUTO: 11.2 % — LOW (ref 13–44)
MAGNESIUM SERPL-MCNC: 1.8 MG/DL — SIGNIFICANT CHANGE UP (ref 1.6–2.6)
MCHC RBC-ENTMCNC: 28.2 PG — SIGNIFICANT CHANGE UP (ref 27–34)
MCHC RBC-ENTMCNC: 29.4 G/DL — LOW (ref 32–36)
MCV RBC AUTO: 96 FL — SIGNIFICANT CHANGE UP (ref 80–100)
MONOCYTES # BLD AUTO: 0.59 K/UL — SIGNIFICANT CHANGE UP (ref 0–0.9)
MONOCYTES NFR BLD AUTO: 5.9 % — SIGNIFICANT CHANGE UP (ref 2–14)
NEUTROPHILS # BLD AUTO: 7.52 K/UL — HIGH (ref 1.8–7.4)
NEUTROPHILS NFR BLD AUTO: 75.8 % — SIGNIFICANT CHANGE UP (ref 43–77)
NRBC # BLD AUTO: 0 K/UL — SIGNIFICANT CHANGE UP (ref 0–0)
NRBC # FLD: 0 K/UL — SIGNIFICANT CHANGE UP (ref 0–0)
NRBC BLD AUTO-RTO: 0 /100 WBCS — SIGNIFICANT CHANGE UP (ref 0–0)
PHOSPHATE SERPL-MCNC: 2.9 MG/DL — SIGNIFICANT CHANGE UP (ref 2.5–4.5)
PLATELET # BLD AUTO: 278 K/UL — SIGNIFICANT CHANGE UP (ref 150–400)
PMV BLD: 11.6 FL — SIGNIFICANT CHANGE UP (ref 7–13)
POTASSIUM SERPL-MCNC: 3.6 MMOL/L — SIGNIFICANT CHANGE UP (ref 3.5–5.3)
POTASSIUM SERPL-SCNC: 3.6 MMOL/L — SIGNIFICANT CHANGE UP (ref 3.5–5.3)
PROT SERPL-MCNC: 6.6 G/DL — SIGNIFICANT CHANGE UP (ref 6–8.3)
RBC # BLD: 3.23 M/UL — LOW (ref 3.8–5.2)
RBC # FLD: 19.9 % — HIGH (ref 10.3–14.5)
SODIUM SERPL-SCNC: 142 MMOL/L — SIGNIFICANT CHANGE UP (ref 135–145)
WBC # BLD: 9.93 K/UL — SIGNIFICANT CHANGE UP (ref 3.8–10.5)
WBC # FLD AUTO: 9.93 K/UL — SIGNIFICANT CHANGE UP (ref 3.8–10.5)

## 2025-07-07 PROCEDURE — 70450 CT HEAD/BRAIN W/O DYE: CPT

## 2025-07-07 PROCEDURE — 86366 MUSCLE-SPECIFIC KINASE ANTB: CPT

## 2025-07-07 PROCEDURE — 80053 COMPREHEN METABOLIC PANEL: CPT

## 2025-07-07 PROCEDURE — 83036 HEMOGLOBIN GLYCOSYLATED A1C: CPT

## 2025-07-07 PROCEDURE — 82962 GLUCOSE BLOOD TEST: CPT

## 2025-07-07 PROCEDURE — 86901 BLOOD TYPING SEROLOGIC RH(D): CPT

## 2025-07-07 PROCEDURE — 87040 BLOOD CULTURE FOR BACTERIA: CPT

## 2025-07-07 PROCEDURE — 83735 ASSAY OF MAGNESIUM: CPT

## 2025-07-07 PROCEDURE — 99223 1ST HOSP IP/OBS HIGH 75: CPT | Mod: 25

## 2025-07-07 PROCEDURE — 83690 ASSAY OF LIPASE: CPT

## 2025-07-07 PROCEDURE — 84100 ASSAY OF PHOSPHORUS: CPT

## 2025-07-07 PROCEDURE — 70553 MRI BRAIN STEM W/O & W/DYE: CPT | Mod: 26

## 2025-07-07 PROCEDURE — 84540 ASSAY OF URINE/UREA-N: CPT

## 2025-07-07 PROCEDURE — G0545: CPT

## 2025-07-07 PROCEDURE — 82330 ASSAY OF CALCIUM: CPT

## 2025-07-07 PROCEDURE — 82803 BLOOD GASES ANY COMBINATION: CPT

## 2025-07-07 PROCEDURE — 36415 COLL VENOUS BLD VENIPUNCTURE: CPT

## 2025-07-07 PROCEDURE — 84133 ASSAY OF URINE POTASSIUM: CPT

## 2025-07-07 PROCEDURE — 85025 COMPLETE CBC W/AUTO DIFF WBC: CPT

## 2025-07-07 PROCEDURE — 86850 RBC ANTIBODY SCREEN: CPT

## 2025-07-07 PROCEDURE — 71045 X-RAY EXAM CHEST 1 VIEW: CPT

## 2025-07-07 PROCEDURE — 86043 ACETYLCHOLN RCPTR MODLG ANTB: CPT

## 2025-07-07 PROCEDURE — 87086 URINE CULTURE/COLONY COUNT: CPT

## 2025-07-07 PROCEDURE — 84132 ASSAY OF SERUM POTASSIUM: CPT

## 2025-07-07 PROCEDURE — 74174 CTA ABD&PLVS W/CONTRAST: CPT

## 2025-07-07 PROCEDURE — 82550 ASSAY OF CK (CPK): CPT

## 2025-07-07 PROCEDURE — 84300 ASSAY OF URINE SODIUM: CPT

## 2025-07-07 PROCEDURE — 99233 SBSQ HOSP IP/OBS HIGH 50: CPT

## 2025-07-07 PROCEDURE — 82570 ASSAY OF URINE CREATININE: CPT

## 2025-07-07 PROCEDURE — 72158 MRI LUMBAR SPINE W/O & W/DYE: CPT | Mod: 26

## 2025-07-07 PROCEDURE — 85610 PROTHROMBIN TIME: CPT

## 2025-07-07 PROCEDURE — 84156 ASSAY OF PROTEIN URINE: CPT

## 2025-07-07 PROCEDURE — 86042 ACETYLCHOLN RCPTR BLCKG ANTB: CPT

## 2025-07-07 PROCEDURE — 87798 DETECT AGENT NOS DNA AMP: CPT

## 2025-07-07 PROCEDURE — 85730 THROMBOPLASTIN TIME PARTIAL: CPT

## 2025-07-07 PROCEDURE — 83605 ASSAY OF LACTIC ACID: CPT

## 2025-07-07 PROCEDURE — 83930 ASSAY OF BLOOD OSMOLALITY: CPT

## 2025-07-07 PROCEDURE — 86900 BLOOD TYPING SEROLOGIC ABO: CPT

## 2025-07-07 PROCEDURE — 84295 ASSAY OF SERUM SODIUM: CPT

## 2025-07-07 PROCEDURE — 80048 BASIC METABOLIC PNL TOTAL CA: CPT

## 2025-07-07 PROCEDURE — 83516 IMMUNOASSAY NONANTIBODY: CPT

## 2025-07-07 PROCEDURE — 87529 HSV DNA AMP PROBE: CPT

## 2025-07-07 PROCEDURE — 81001 URINALYSIS AUTO W/SCOPE: CPT

## 2025-07-07 PROCEDURE — 99497 ADVNCD CARE PLAN 30 MIN: CPT | Mod: 25

## 2025-07-07 PROCEDURE — 86255 FLUORESCENT ANTIBODY SCREEN: CPT

## 2025-07-07 PROCEDURE — 99233 SBSQ HOSP IP/OBS HIGH 50: CPT | Mod: GC

## 2025-07-07 PROCEDURE — 99222 1ST HOSP IP/OBS MODERATE 55: CPT

## 2025-07-07 RX ORDER — BISACODYL 5 MG
10 TABLET, DELAYED RELEASE (ENTERIC COATED) ORAL ONCE
Refills: 0 | Status: COMPLETED | OUTPATIENT
Start: 2025-07-07 | End: 2025-07-07

## 2025-07-07 RX ORDER — B1/B2/B3/B5/B6/B12/VIT C/FOLIC 500-0.5 MG
1 TABLET ORAL DAILY
Refills: 0 | Status: DISCONTINUED | OUTPATIENT
Start: 2025-07-07 | End: 2025-07-16

## 2025-07-07 RX ORDER — SODIUM CHLORIDE 9 G/1000ML
250 INJECTION, SOLUTION INTRAVENOUS ONCE
Refills: 0 | Status: COMPLETED | OUTPATIENT
Start: 2025-07-07 | End: 2025-07-07

## 2025-07-07 RX ORDER — MAGNESIUM SULFATE 500 MG/ML
1 SYRINGE (ML) INJECTION ONCE
Refills: 0 | Status: COMPLETED | OUTPATIENT
Start: 2025-07-07 | End: 2025-07-07

## 2025-07-07 RX ADMIN — Medication 100 GRAM(S): at 09:02

## 2025-07-07 RX ADMIN — Medication 113 MILLIGRAM(S): at 19:01

## 2025-07-07 RX ADMIN — Medication 1000 MILLIGRAM(S): at 09:02

## 2025-07-07 RX ADMIN — ENOXAPARIN SODIUM 40 MILLIGRAM(S): 100 INJECTION SUBCUTANEOUS at 16:51

## 2025-07-07 RX ADMIN — SODIUM CHLORIDE 250 MILLILITER(S): 9 INJECTION, SOLUTION INTRAVENOUS at 17:47

## 2025-07-07 RX ADMIN — POLYETHYLENE GLYCOL 3350 17 GRAM(S): 17 POWDER, FOR SOLUTION ORAL at 18:04

## 2025-07-07 NOTE — DIETITIAN INITIAL EVALUATION ADULT - ADD RECOMMEND
1. Continue with puree diet per SLP recommendations. RD to add Magic cup BID (290kcal, 9g protein per serving), Mighty Shake BID (220kcal, 6g protein per serving), fortified mashed potatoes (240kcal, 14g protein per serving).  2. Encourage pt to meet nutritional needs as able   3. Monitor labs: electrolytes, cbc  4. Monitor weights   5. Pain and bowel regimen per team   6. Will continue to assess/honor food preferences as able  7. Monitor adherence to diet education  1. Continue with puree diet per SLP recommendations. RD to add Magic cup BID (290kcal, 9g protein per serving), Mighty Shake BID (220kcal, 6g protein per serving), fortified mashed potatoes (240kcal, 14g protein per serving). Recommend MVI to promote wound healing. Consider adding Vitamin C and zinc sulfate.  2. Encourage pt to meet nutritional needs as able   3. Monitor labs: electrolytes, cbc  4. Monitor weights   5. Pain and bowel regimen per team   6. Will continue to assess/honor food preferences as able  7. Monitor adherence to diet education

## 2025-07-07 NOTE — CONSULT NOTE ADULT - SUBJECTIVE AND OBJECTIVE BOX
Patient is a 89y old  Female who presents with a chief complaint of Rectal bleeding (07 Jul 2025 06:53)      HPI:  89y F with pmh dementia, metastatic breast cancer, hypertension, stage 3a chronic kidney disease, hyperlipidemia, cerebral meningioma, hyponatremia, and vitamin D deficiency brought in by EMS for rectal bleeding.  The patient's daughter reports noticing blood (maroon and dark in nature) in the stool for the past 4 days and denies associated nausea, vomiting, or diarrhea. The daughter reports that the patient had an ulcer on her lower back for 2 weeks and that she occasionally scratches it. The patient hasn't been eating for the past week and has been given ensure and soup by he daughter.  The patient was started on CMF chemotherapy regimen received 3 cycles of treatment  with the last one being on Betina-3-2025. She was scheduled for her next cycle on Betina 15 but was unable to attend to it as she has been having difficulty ambulating and became bedbound since that time,  She had rectal bleeding in 2021 and underwent colonoscopy which showed multiple polyps, all of which were removed.  She follows up with Dr. Tyson Schumacher (PCP), Dr. Poly Keith (med onc), Dr. Jesse Villeda (rad onc), and Dr. Mauri Arroyo (breast surgeon)      In the ED:  Vitals: T 97.9, BP 90/61, HR 96, RR 20, SpO2 98% (RA)  Labs: MCHC 28.6 (H), smudge cells, lactate 2.4, sodium 157, , Cr 1.59, eGFR 30, glucose 124, protein total 9.4 , albumin 2.7, Mg 3.1  Urine: cloudy, protein 30, RBC 99, WBC 7 , moderate leukocyte esterase, few bacteria  CXR: normal  Imaging: CT scan showed no evidence of active gastrointestinal bleeding. Partially visualized left breast mass largest measuring up to 4.7cm, and increase in size  2L fluids (05 Jul 2025 09:01)    PAST MEDICAL & SURGICAL HISTORY:  Alzheimer's disease  Alzheimer's dementia      Hyperlipidemia  Hyperlipidemia      Essential hypertension  HTN (hypertension)      Benign neoplasm of cerebral meninges  Left frontal lobe      Breast cancer        MEDICATIONS  (STANDING):  dextrose 5%. 1000 milliLiter(s) (175 mL/Hr) IV Continuous <Continuous>  dextrose 5%. 1000 milliLiter(s) (100 mL/Hr) IV Continuous <Continuous>  dextrose 5%. 1000 milliLiter(s) (50 mL/Hr) IV Continuous <Continuous>  dextrose 50% Injectable 25 Gram(s) IV Push once  dextrose 50% Injectable 12.5 Gram(s) IV Push once  dextrose 50% Injectable 25 Gram(s) IV Push once  enoxaparin Injectable 40 milliGRAM(s) SubCutaneous every 24 hours  glucagon  Injectable 1 milliGRAM(s) IntraMuscular once  polyethylene glycol 3350 17 Gram(s) Oral every 12 hours  senna 2 Tablet(s) Oral at bedtime    MEDICATIONS  (PRN):  acetaminophen     Tablet .. 650 milliGRAM(s) Oral every 6 hours PRN Temp greater or equal to 38C (100.4F), Mild Pain (1 - 3)  aluminum hydroxide/magnesium hydroxide/simethicone Suspension 30 milliLiter(s) Oral every 4 hours PRN Dyspepsia  benzonatate 100 milliGRAM(s) Oral three times a day PRN Cough  dextrose Oral Gel 15 Gram(s) Oral once PRN Blood Glucose LESS THAN 70 milliGRAM(s)/deciliter  melatonin 3 milliGRAM(s) Oral at bedtime PRN Insomnia          Home Living Status :  lives with her daughter in a 3rd floor walkup apt          -  Home Services :  none         Baseline Functional Level Prior to Admission :             - ADL's/ IADL's :   requires partial assistance with IADLS         - Ambulatory status prior to admission :   walked with no assistive devices , has a cane , rolling walker         FAMILY HISTORY:      CBC Full  -  ( 07 Jul 2025 07:00 )  WBC Count : 9.93 K/uL  RBC Count : 3.23 M/uL  Hemoglobin : 9.1 g/dL  Hematocrit : 31.0 %  Platelet Count - Automated : 278 K/uL  Mean Cell Volume : 96.0 fl  Mean Cell Hemoglobin : 28.2 pg  Mean Cell Hemoglobin Concentration : 29.4 g/dL  Auto Neutrophil # : 7.52 K/uL  Auto Lymphocyte # : 1.11 K/uL  Auto Monocyte # : 0.59 K/uL  Auto Eosinophil # : 0.61 K/uL  Auto Basophil # : 0.04 K/uL  Auto Neutrophil % : 75.8 %  Auto Lymphocyte % : 11.2 %  Auto Monocyte % : 5.9 %  Auto Eosinophil % : 6.1 %  Auto Basophil % : 0.4 %      07-06    144  |  115[H]  |  47[H]  ----------------------------<  133[H]  3.6   |  21[L]  |  0.89    Ca    7.7[L]      06 Jul 2025 22:53  Phos  2.6     07-06  Mg     2.3     07-06    TPro  6.9  /  Alb  2.3[L]  /  TBili  0.4  /  DBili  x   /  AST  20  /  ALT  12  /  AlkPhos  53  07-06      Urinalysis Basic - ( 06 Jul 2025 22:53 )    Color: x / Appearance: x / SG: x / pH: x  Gluc: 133 mg/dL / Ketone: x  / Bili: x / Urobili: x   Blood: x / Protein: x / Nitrite: x   Leuk Esterase: x / RBC: x / WBC x   Sq Epi: x / Non Sq Epi: x / Bacteria: x        Radiology :       ACC: [id number] EXAM:  CT BRAIN   ORDERED BY: CAIN GAINES     PROCEDURE DATE:  07/06/2025          INTERPRETATION:  CLINICAL INFORMATION: Dysarthria. Right facial droop.    COMPARISON: MRI brain 9/2/2016. CT head 5/29/2015. Additional prior   studies dating back to 3/21/2014.    CONTRAST:  IV Contrast: NONE  .    TECHNIQUE:  Serial axial images were obtained from the skull base to the   vertex using multi-slice helical technique. Sagittal and coronal   reformats were obtained.    FINDINGS:    Somewhat limited evaluation due to motion.    VENTRICLES AND SULCI: Age appropriate involutional changes.  INTRA-AXIAL: No mass effect, acute hemorrhage, or midline shift.  There   are periventricular and subcortical white matter hypodensities,   consistent with moderate microvascular type changes.  EXTRA-AXIAL: No mass or fluid collection. Basal cisterns are normal in   appearance.    VISUALIZED SINUSES:  Clear.  TYMPANOMASTOID CAVITIES:  Clear.  VISUALIZED ORBITS: Normal.  CALVARIUM: Left frontal craniotomy. No acute fracture.      IMPRESSION:  No evidence of acute intracranial pathology.            Review of Systems : per HPI         Vital Signs Last 24 Hrs  T(C): 36.6 (07 Jul 2025 05:51), Max: 37 (06 Jul 2025 11:44)  T(F): 97.8 (07 Jul 2025 05:51), Max: 98.6 (06 Jul 2025 11:44)  HR: 94 (07 Jul 2025 05:51) (83 - 94)  BP: 125/64 (07 Jul 2025 05:51) (123/67 - 138/66)  BP(mean): 86 (06 Jul 2025 20:38) (86 - 90)  RR: 18 (07 Jul 2025 05:51) (18 - 18)  SpO2: 95% (07 Jul 2025 05:51) (95% - 98%)    Parameters below as of 07 Jul 2025 05:51  Patient On (Oxygen Delivery Method): room air            Physical Exam:   89 y o woman  lying comfortably in semi Rodriguez's position , somnolent ,  NAD     Head: normocephalic , atraumatic    Eyes: PERRLA , EOMI , no nystagmus , sclera anicteric    ENT / FACE: neg nasal discharge , uvula midline , no oropharyngeal erythema / exudate    Neck: supple , negative JVD , negative carotid bruits , no thyromegaly    Chest: CTA bilaterally      Cardiovascular: regular rate and rhythm , neg murmurs / rubs / gallops    Abdomen: soft , non distended , no tenderness to palpation in all 4 quadrants ,  normal bowel sounds     Extremities: WWP , neg cyanosis /clubbing / edema     Neurologic Exam:     somnolent and oriented to person     Cranial Nerves:           II:                         pupils equal , round and reactive to light , visual fields intact         III/ IV/VI:             extraocular movements intact , neg nystagmus , neg ptosis        V:                        facial sensation intact , V1-3 normal        VII:                      face symmetric , no droop , normal eye closure and smile        VIII:                     hearing intact to finger rub bilaterally        IX and X:             no hoarseness , gag intact , palate/ uvula rise symmetrically        XI:                       SCM / trapezius strength intact bilateral        XII:                      no tongue deviation    Motor Exam:        poor effort 2/2 mental status > 3/5 x 4 extremities     Sensation:         withdraws to pinch  x 4 extremities     DTR:           biceps/brachioradialis: equal                            patella/ankle: equal          neg Babinski       Gait:  not tested         PM&R Impression: admitted for BRBPR      - deconditioned    - no focal weakness        Recommendations / Plan:       1) Physical / Occupational therapy focusing on therapeutic exercises , equipment evaluation , bed mobility/transfer out of bed evaluation , progressive ambulation with assistive devices prn .    2) Current disposition plan recommendation:   subacute rehab placement

## 2025-07-07 NOTE — CONSULT NOTE ADULT - SUBJECTIVE AND OBJECTIVE BOX
INFECTIOUS DISEASES INITIAL CONSULT NOTE    HPI:  89y F with pmh dementia, metastatic breast cancer, hypertension, stage 3a chronic kidney disease, hyperlipidemia, cerebral meningioma, hyponatremia, and vitamin D deficiency brought in by EMS for rectal bleeding on 7/5/2025. The patient's daughter reports noticing blood (maroon and dark in nature) in the stool for the past 4 days prior to admission and denies associated nausea, vomiting, or diarrhea. The daughter reports that the patient had an ulcer on her lower back for the past 2 weeks and that she occasionally picks at it. The patient hasn't been eating for the past week but has been given ensure and soup by the daughter. Of note, patient was started on CMF chemotherapy regimen received 3 cycles of treatment with the last one being on 6/3/2025. She was scheduled for her next cycle on 6/15 but was unable to attend to it as she has been having difficulty ambulating and became bedbound since that time. She has had rectal bleeding in 2021 and underwent colonoscopy at that time which showed multiple polyps, all of which were removed. She follows up with Dr. Tyson Schumacher (PCP), Dr. Poly Keith (med onc), Dr. Jesse Villeda (rad onc), and Dr. Mauri Arryoo (breast surgeon).     In the ED:  Vitals: T 97.9, BP 90/61, HR 96, RR 20, SpO2 98% (RA)  Labs: MCHC 28.6 (H), smudge cells, lactate 2.4, sodium 157, , Cr 1.59, eGFR 30, glucose 124, protein total 9.4 , albumin 2.7, Mg 3.1  Urine: cloudy, protein 30, RBC 99, WBC 7 , moderate leukocyte esterase, few bacteria  CXR: normal  Imaging: CT scan showed no evidence of active gastrointestinal bleeding. Partially visualized left breast mass largest measuring up to 4.7cm, and increase in size  2L fluids     Patient has been afebrile since admission. She was seen and examined with daughter Bozena at bedside. Patient A&Ox1 (baseline A&Ox1-2), was initially sleeping about aroused to verbal calling of her name. Daughter reports patient only complained of leg pain at home with no injury, she states patient just woke up one morning and said that she could not get up due to the pain. Daughter denies her complaining of fever/chills, shortness of breath, chest pain, abdominal pain or dysuria. Daughter states patient is aware when she need to urinate or defecate and denied any numbness to buttock region. Patient was interactive - pulling at blankets and sheets but uncooperative with exam. Patient was initially responding yes or no to questions asked by daughter but her mental status waxed and waned throughout interview.     PAST MEDICAL & SURGICAL HISTORY:  Alzheimer's disease  Hyperlipidemia  Essential hypertension  Benign neoplasm of cerebral meninges  Left frontal lobe  Breast cancer    Review of Systems:   Constitutional, eyes, ENT, cardiovascular, respiratory, gastrointestinal, genitourinary, integumentary, neurological, psychiatric and heme/lymph are otherwise negative other than noted above     ANTIBIOTICS:  MEDICATIONS  (STANDING):  acyclovir IVPB 650 milliGRAM(s) IV Intermittent every 12 hours  dextrose 5%. 1000 milliLiter(s) (100 mL/Hr) IV Continuous <Continuous>  dextrose 5%. 1000 milliLiter(s) (50 mL/Hr) IV Continuous <Continuous>  dextrose 50% Injectable 25 Gram(s) IV Push once  dextrose 50% Injectable 12.5 Gram(s) IV Push once  dextrose 50% Injectable 25 Gram(s) IV Push once  enoxaparin Injectable 40 milliGRAM(s) SubCutaneous every 24 hours  glucagon  Injectable 1 milliGRAM(s) IntraMuscular once  multivitamin 1 Tablet(s) Oral daily  polyethylene glycol 3350 17 Gram(s) Oral every 12 hours  senna 2 Tablet(s) Oral at bedtime    MEDICATIONS  (PRN):  acetaminophen     Tablet .. 650 milliGRAM(s) Oral every 6 hours PRN Temp greater or equal to 38C (100.4F), Mild Pain (1 - 3)  aluminum hydroxide/magnesium hydroxide/simethicone Suspension 30 milliLiter(s) Oral every 4 hours PRN Dyspepsia  benzonatate 100 milliGRAM(s) Oral three times a day PRN Cough  dextrose Oral Gel 15 Gram(s) Oral once PRN Blood Glucose LESS THAN 70 milliGRAM(s)/deciliter  melatonin 3 milliGRAM(s) Oral at bedtime PRN Insomnia    Allergies  No Known Allergies  Intolerances    SOCIAL HISTORY:  Patient lives at home with daughter and used to ambulate with rolling walker at baseline until 6/23/2025.  passed away. She was born in [facility] and moved to [place] in [place]. No recent travel or sick contacts, no animals at home for the past 2 years (previous dog has passed away). No cigarette smoking, alcohol use or recreational drug use.     FAMILY HISTORY:   no FH leading to current infection    Vital Signs Last 24 Hrs  T(C): 36.7 (07 Jul 2025 12:00), Max: 36.7 (07 Jul 2025 12:00)  T(F): 98.1 (07 Jul 2025 12:00), Max: 98.1 (07 Jul 2025 12:00)  HR: 81 (07 Jul 2025 12:00) (81 - 94)  BP: 108/62 (07 Jul 2025 12:00) (108/62 - 125/64)  BP(mean): 78 (07 Jul 2025 12:00) (78 - 86)  RR: 18 (07 Jul 2025 12:00) (18 - 18)  SpO2: 96% (07 Jul 2025 12:00) (95% - 96%)    Parameters below as of 07 Jul 2025 12:00  Patient On (Oxygen Delivery Method): room air        07-06-25 @ 07:01  -  07-07-25 @ 07:00  --------------------------------------------------------  IN: 0 mL / OUT: 900 mL / NET: -900 mL    07-07-25 @ 07:01  -  07-07-25 @ 20:05  --------------------------------------------------------  IN: 0 mL / OUT: 550 mL / NET: -550 mL        PHYSICAL EXAM:  Constitutional: awake & alert, NAD  Eyes: the sclera and conjunctiva were normal.   ENT: the ears and nose were normal in appearance.   Neck: the appearance of the neck was normal and the neck was supple.   Pulmonary: no respiratory distress and lungs were clear to auscultation bilaterally.   Heart: heart rate was normal and rhythm regular, normal S1 and S2  Vascular: 1+ pitting edema of BLE   Abdomen: normal bowel sounds, soft, non-tender  Neurological: A&Ox1 (baseline 1-2) with no focal deficits, moving extremities spontaneously      LABS:                        9.1    9.93  )-----------( 278      ( 07 Jul 2025 07:00 )             31.0     07-07    142  |  112[H]  |  34[H]  ----------------------------<  134[H]  3.6   |  21[L]  |  0.87    Ca    7.6[L]      07 Jul 2025 07:00  Phos  2.9     07-07  Mg     1.8     07-07    TPro  6.6  /  Alb  1.9[L]  /  TBili  0.3  /  DBili  x   /  AST  17  /  ALT  11  /  AlkPhos  57  07-07    Urinalysis Basic - ( 07 Jul 2025 07:00 )    Color: x / Appearance: x / SG: x / pH: x  Gluc: 134 mg/dL / Ketone: x  / Bili: x / Urobili: x   Blood: x / Protein: x / Nitrite: x   Leuk Esterase: x / RBC: x / WBC x   Sq Epi: x / Non Sq Epi: x / Bacteria: x    MICROBIOLOGY:    Urinalysis with Rflx Culture (collected 05 Jul 2025 07:20)    Culture - Urine (collected 05 Jul 2025 07:20)  Source: Clean Catch None  Final Report (06 Jul 2025 08:34):    >=3 organisms. Probable collection contamination.    Culture - Blood (collected 05 Jul 2025 04:21)  Source: Blood Blood-Peripheral  Preliminary Report (07 Jul 2025 13:02):    No growth at 48 Hours    Culture - Blood (collected 05 Jul 2025 04:21)  Source: Blood Blood-Peripheral  Preliminary Report (07 Jul 2025 13:02):    No growth at 48 Hours    RADIOLOGY & ADDITIONAL STUDIES:  Reviewed.

## 2025-07-07 NOTE — PROGRESS NOTE ADULT - PROBLEM SELECTOR PLAN 2
- bladder scan with 380cc x2, straight cathed   - possible etiology: constipation, KUB with increased stool burden, miralax and senna ordered  - if failed x3, consider need for pinzon - bladder scan with 390cc, straight cathed   - possible etiology: constipation, KUB with increased stool burden, miralax and senna ordered  - consider need for pinzon - bladder scan with 390cc, straight cathed   - possible etiology: constipation, KUB with increased stool burden, miralax and senna ordered  - pinzon placed

## 2025-07-07 NOTE — CONSULT NOTE ADULT - PROBLEM SELECTOR RECOMMENDATION 2
.  - Common complication in immobile patients, highlighting decreased mobility and increased care needs.  - Ulcer(s) pose risks of infection, pain, and further functional decline, compounding the challenges of advanced disease and often reflects an overall deterioration in health and a trajectory towards end-of-life. .  Patient with several risk factors for chronic constipation including physical inactivity, concurrent medication use, decreased caloric intake, comorbidities, female gender  - Recommend mLax qAM 17 g/day standing, hold for loose stool  - Recommend senna 2 tabs PO q12h PRN No BM for >3 days  - If above is ineffective, recommend bisacodyl suppository   - Recommend daily fiber intake 20-25 g/day, increasing fluid intake and OOB/movement as able  - Primary goal is to relieve symptoms, secondary is to have at least 3-4 easy to pass BMs per week

## 2025-07-07 NOTE — PROGRESS NOTE ADULT - PROBLEM SELECTOR PLAN 11
Patient has a history of hyperlipidemia. She was prescribed atorvastatin 10mg per surescripts  but hasn't been taking it for weeks per daughter.    - hold home meds Patient has a history of left frontal meningioma that was resected in 2015    Patient has a history of hypertension. She was prescribed valsartan 40mg per surescripts but hasn't been taking it for weeks per daughter.  Blood pressure on admission was 90/61 went up to 137/58 after fluid bolus in the ED.    - hold home valsartan    HLD  Patient has a history of hyperlipidemia. She was prescribed atorvastatin 10mg per surescripts  but hasn't been taking it for weeks per daughter.    - hold home meds

## 2025-07-07 NOTE — PROGRESS NOTE ADULT - ASSESSMENT
89y F with pmh Alzheimer's dementia, metastatic breast cancer, hypertension, stage 3a chronic kidney disease, hyperlipidemia, cerebral meningioma, hyponatremia, and vitamin D deficiency brought in by EMS for rectal bleeding.   89y F with pmh Alzheimer's dementia, metastatic breast cancer, hypertension, stage 3a chronic kidney disease, hyperlipidemia, cerebral meningioma, hyponatremia, and vitamin D deficiency brought in by EMS for rectal bleeding. CTAP showed no GI bleeding. 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 Also with new weakness, CTH showed no acute intracranial pathology. Course complicated by hypernatremia to 163 which resolved with D5.    89y F with pmh Alzheimer's dementia, metastatic breast cancer, hypertension, stage 3a chronic kidney disease, hyperlipidemia, cerebral meningioma, hyponatremia, and vitamin D deficiency brought in by EMS for rectal bleeding. CTAP showed no GI bleeding. 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 Also with new weakness, CTH showed no acute intracranial pathology. Course complicated by hypernatremia to 163 which resolved with D5 now at 142.    89y F with pmh Alzheimer's dementia, metastatic breast cancer, hypertension, stage 3a chronic kidney disease, hyperlipidemia, cerebral meningioma, hyponatremia, and vitamin D deficiency brought in by EMS for rectal bleeding. CTAP showed no GI bleeding. 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 Also with new weakness, CTH showed no acute intracranial pathology. Course complicated by hypernatremia to 163 which resolved with D5 now at 142.

## 2025-07-07 NOTE — CONSULT NOTE ADULT - PROBLEM SELECTOR RECOMMENDATION 4
on neoadjuvant chemo; CMF s/p 3 cycles 4/15, 5/13 and 6/3   - follows with Med Onc Dr. Poly Keith   - If no further disease modifying treatments offered or patient/family declined further disease modifying treatments,  patient would qualify for hospice .  alb 2.6-2.7  - Malnutrition secondary to malignancy is contributing to patient's progressive functional decline, increasing vulnerability to infections, and impairing wound healing, ultimately impacting prognosis  - nutrition consult, supportive care  - Allow food for comfort with strict aspiration precautions if patient is awake and willing

## 2025-07-07 NOTE — CONSULT NOTE ADULT - PROBLEM SELECTOR RECOMMENDATION 5
- comorbidity supports limited life expectancy and long term prognosis on neoadjuvant chemo; CMF s/p 3 cycles 4/15, 5/13 and 6/3 with functional decline, deconditioning, and declining nutritional status   - follows with Med Onc Dr. Poly Keith   - If no further disease modifying treatments offered or patient/family declined further disease modifying treatments,  patient would qualify for hospice; GOC discussions are ongoing

## 2025-07-07 NOTE — CONSULT NOTE ADULT - ATTENDING COMMENTS
89F pmhx dementia Agree w above. 89F pmhx dementia, triple-negative L breast cancer  (diag. 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 Also p/w AMS and FTT x ~1 wk. She was found to have hyponatremia and RAFFI on CKD. 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 Neuro c/f HSV-2 Elsberg syndrome (lumbosacral radiculitis w/wo myelitits).   DC ValACV; start IV ACV 10 mg/kg, renally adjusted w prehydration  She is awaiting MRI and goals of care (?LP for CSF analysis).   ID Team 1 will follow.

## 2025-07-07 NOTE — DIETITIAN INITIAL EVALUATION ADULT - PERTINENT MEDS FT
MEDICATIONS  (STANDING):  dextrose 5%. 1000 milliLiter(s) (175 mL/Hr) IV Continuous <Continuous>  dextrose 5%. 1000 milliLiter(s) (100 mL/Hr) IV Continuous <Continuous>  dextrose 5%. 1000 milliLiter(s) (50 mL/Hr) IV Continuous <Continuous>  dextrose 50% Injectable 25 Gram(s) IV Push once  dextrose 50% Injectable 12.5 Gram(s) IV Push once  dextrose 50% Injectable 25 Gram(s) IV Push once  enoxaparin Injectable 40 milliGRAM(s) SubCutaneous every 24 hours  glucagon  Injectable 1 milliGRAM(s) IntraMuscular once  polyethylene glycol 3350 17 Gram(s) Oral every 12 hours  senna 2 Tablet(s) Oral at bedtime  valACYclovir 1000 milliGRAM(s) Oral two times a day    MEDICATIONS  (PRN):  acetaminophen     Tablet .. 650 milliGRAM(s) Oral every 6 hours PRN Temp greater or equal to 38C (100.4F), Mild Pain (1 - 3)  aluminum hydroxide/magnesium hydroxide/simethicone Suspension 30 milliLiter(s) Oral every 4 hours PRN Dyspepsia  benzonatate 100 milliGRAM(s) Oral three times a day PRN Cough  dextrose Oral Gel 15 Gram(s) Oral once PRN Blood Glucose LESS THAN 70 milliGRAM(s)/deciliter  melatonin 3 milliGRAM(s) Oral at bedtime PRN Insomnia

## 2025-07-07 NOTE — PROGRESS NOTE ADULT - PROBLEM SELECTOR PLAN 1
- new onset subjective weakness   - non-focal, neuro following   - recs for MRI with/without contrast brain and L spine   - CK, Myasthenia Gravis/Lambert-Eaton Myasthenic Syndrome Evaluation, Acetylcholine Modulating AB, Acetylcholine Blocking, LRP4 Autoantibody Test, Musk antibody, Paraneoplastic panel, ganglioside Ab ordered, PND   - NIF/VC ordered PND - new onset subjective weakness   - non-focal, neuro following   - MRI with/without contrast brain and L spine today  - CK, Myasthenia Gravis/Lambert-Eaton Myasthenic Syndrome Evaluation, Acetylcholine Modulating AB, Acetylcholine Blocking, LRP4 Autoantibody Test, Musk antibody, Paraneoplastic panel, ganglioside Ab ordered, PND   - NIF/VC ordered PND - new onset subjective weakness   - non-focal, neuro following   - MRI with/without contrast brain and L spine today  -  Awaiting CK, Myasthenia Gravis/Lambert-Eaton Myasthenic Syndrome Evaluation, Acetylcholine Modulating AB, Acetylcholine Blocking, LRP4 Autoantibody Test, Musk antibody, Paraneoplastic panel, ganglioside Ab ordered, PND   - NIF/VC ordered PND

## 2025-07-07 NOTE — PROGRESS NOTE ADULT - PROBLEM SELECTOR PLAN 5
Patient's daughter was told that the patient has CKD but patient never experienced any problems with urination and has appropriate urine output.  On admission: Cr 1.59, , eGFR 30    - manage hypernatremia and bladder scans as above Patient's daughter was told that the patient has CKD but patient never experienced any problems with urination and has appropriate urine output.  On admission: Cr 1.59, , eGFR 30  Now: Cr .87 BUN 34, eGFR 63    - RAFFI resolved Patient has a history of CKD stage 3a and hyponatremia (possibly 2/2 SIADH per outpatient nephro note).  Sodium was 157 on admission, went up to 165 after the administration of 2L of NS.  Patient has 4.3L free water deficit  Renal consulted, given 165mL/hr D5  Na downtrending 144 > 142    - resolved

## 2025-07-07 NOTE — DIETITIAN INITIAL EVALUATION ADULT - PROBLEM SELECTOR PLAN 3
Patient's daughter was told that the patient has CKD but patient never experienced any problems with urination and has appropriate urine output.  On admission: Cr 1.59, , eGFR 30    - manage hypernatremia and bladder scans as above Patient/Caregiver provided printed discharge information.

## 2025-07-07 NOTE — PROGRESS NOTE ADULT - ATTENDING COMMENTS
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 herpetic looking) and found to have ME/FTT with hyponatremia, RAFFI on CKD3a and functional quadriplegia Vs gait abnormality /bed bound for 5 days.   AAOx1 conversant in Cantonese, denies pain, urinary symptoms, denies saddle anesthesia, BMx2 overnight, still has urinary retention w. PVR 500ml  NAD Heart RRR normal S1 S2,Lungs clear Abd soft ND/NT, Ext: no edema, chronic skin changes, Rectal: rectal fissure laceration and a 2-3 rectal ulcers~ 1cm, neuro +3/5 RLE/RLE, no pronation drift  Labs/imaging reviewed     # rectal bleed likely 2/2 sacral ulcer ( HSV detected)   # r/o Elsberg syndrome ( HSV myeloradiculitis)   -CTA abd negative, doubt LGIB/diverticulosis bleed, Hgb normal 12-> 9.8-> 9.1 ( hemodilution from D5W). likely bleeding from the rectal ulcer doubt sacral decubitus  - HSV detected from sacral ulcer wound, consider Valtrex 1g PO bid for 7 days   - Neuro as d/w  raised concerns for HSV myeloradiculitis ( low clinical suspicion, no saddle anesthesia). ID consult as d/w TEAM 1 Dr. Keshia Dorsey   - local wound care w/ foam dressing  - WOCN consult     # Hx Hyponatremia/SIADH  # Hypernatremia ( corrected)   # RAFFI corrected ( likely 2/2 prerenal azotemia Vs ischemic ATN) on CKD3a  # acute urinary retention likely 2/2 constipation( moderate stool burden seen on CT)   - Renal f/u appreciated  - SNa baseline 133( 4/15/25)-> 157 ( ED 7/5)-> 165 peaked -> 157 -> 152-> 142  - SCr baseline 0.7( 4/15/25)-> 1.61 (ED)-> 1.61 peaked -> 1.21-> 1.1-> 0.87  - PVR 550ml and 400ml s/p straight cath x2 , SC for PVR>300ml-> 500ml, will need pinzon cath placement ( 7/7)   - bowel regimen for constipation; Senna 2 tabs qHS and miralax 17g PO daily , monitor BM ( 2BMs overnight), will get portable abd x-ray to evaluate stool burden    # FTT likely related to chemoRx   # Protein Calorie malnutrition (albumin 2.6-2.7)   - Nutrition consult   - Ensure max protein (Vanilla) bid     # Deconditioning   # Functional quadriplegia   # gait abnormality to rule out Brain mets Vs stroke Vs medical induced neuropathy , doubt Elsberg syndrome   - Neuro consult appreciated   - CTH negative( 7/6)  - MRI brain w/wo contrast and MRI lumber spine w/wo contrast ordered ( expedited)   - Obtain CK, Myasthenia Gravis/Lambert-Eaton Myasthenic Syndrome Evaluation, Acetylcholine Modulating AB, Acetylcholine Blocking, LRP4 Autoantibody Test, Musk antibody, Paraneoplastic panel, ganglioside Ab  - Obtain NIF/VC for baseline  - Fall precautions   - PT eval rec: JEREMY, but daughter Bozena declined. Opted for home PT     # Stage IIIc/T3N2/Mo triple negative IDC L breast ( TNBC)  - undergoing neoadjuvant chemo; CMF s/p 3 cycles ( 4/15, 5/13 and 6/3)    - Breast surgeon Dr. Mauri arroyo, Med Onc Dr. Poly Keith ( informed via emails)   - Palliative care consult appreciated for GOC, and home Hospice     # Alzheimer's dementia wo behavioral disturbance  # ? ME   - AAOx1  - SLP eval appreciated rec: puree w/ thin   - delirium precaution     # DVT ppx:  Lovenox     # Advance directives: DNR/DNI (MOLST form filled out) GOC as d/w daughter/Yoli, no heroic measures i.e. CPR or intubation in an event required resuscitation. Informed Dr. Arroyo and Dr. Keith about admission     Contacts:  legal guardian( daughter): Bozena (Yoli) Tomasa [phone]  PMD Dr.Daniel Schumacher  Breast Surgeon: Dr.Paul Arroyo   Med Onc Dr. Poly Keith   Rad Onc Dr. Jesse Villeda     POC as d/w 7UR2 team    Time-based billing (NON-critical care).     51 minutes spent on total encounter. The necessity of the time spent during the encounter on this date of service was due to:     Time spent on this encounter including   reviewed HIE for collaterals, chart, vitals,labs, imaging   interpretation and documentation  discussion with caregiver and housestaff   excluded teaching time, separately billed services.

## 2025-07-07 NOTE — OCCUPATIONAL THERAPY INITIAL EVALUATION ADULT - PERTINENT HX OF CURRENT PROBLEM, REHAB EVAL
89y F with pmh Alzheimer's dementia, metastatic breast cancer, hypertension, stage 3a chronic kidney disease, hyperlipidemia, cerebral meningioma, hyponatremia, and vitamin D deficiency brought in by EMS for rectal bleeding. CTAP showed no GI bleeding. 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 Also with new weakness, CTH showed no acute intracranial pathology. Course complicated by hypernatremia to 163 which resolved with D5 now at 142.

## 2025-07-07 NOTE — CONSULT NOTE ADULT - ASSESSMENT
I M    89y F with pmh Alzheimer's dementia, metastatic breast cancer, hypertension, stage 3a chronic kidney disease, hyperlipidemia, cerebral meningioma, hyponatremia, and vitamin D deficiency brought in by EMS for rectal bleeding.    Problem/Plan - 1:  ·  Problem: Weakness.   ·  Plan: - new onset subjective weakness   - non-focal, neuro following   - recs for MRI with/without contrast brain and L spine   - CK, Myasthenia Gravis/Lambert-Eaton Myasthenic Syndrome Evaluation, Acetylcholine Modulating AB, Acetylcholine Blocking, LRP4 Autoantibody Test, Musk antibody, Paraneoplastic panel, ganglioside Ab ordered, PND   - NIF/VC ordered PND.    Problem/Plan - 2:  ·  Problem: Urinary retention.   ·  Plan: - bladder scan with 380cc x2, straight cathed   - possible etiology: constipation, KUB with increased stool burden, miralax and senna ordered  - if failed x3, consider need for pinzon.    Problem/Plan - 3:  ·  Problem: Sacral ulcer.   ·  Plan: Patient's daughter started noticing blood in the stool for the past 4 days while changing her diapers. Initially, GI bleed was suspected, but CT angio of abdomen/pelvis was normal and patient's hgb was 12.1 but on examination patient has an oozing sacral decubitus ulcer on examination. Patient has been having difficulty walking due to leg swelling and has been bed bound recently.  Bleeding is likely 2/2 sacral decubitus ulcer.    - wound care consulted  - HSV swab PND  - DVT ppx resumed.    Problem/Plan - 4:  ·  Problem: Hypernatremia.   ·  Plan: Patient has a history of CKD stage 3a and hyponatremia (possibly 2/2 SIADH per outpatient nephro note).  Sodium was 157 on admission, went up to 165 after the administration of 2L of NS.  Patient has 4.3L free water deficit    - 175ml/hr D5  - repeat BMP to trend sodium q8  - bladder scan for PVR q6h.    Problem/Plan - 5:  ·  Problem: Acute kidney injury superimposed on CKD.   ·  Plan: Patient's daughter was told that the patient has CKD but patient never experienced any problems with urination and has appropriate urine output.  On admission: Cr 1.59, , eGFR 30    - manage hypernatremia and bladder scans as above.    Problem/Plan - 6:  ·  Problem: Breast cancer.   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 She was deemed as not a candidate for immunotherapy or surgery. She was started on chemotherapy and underwent 3 cycles, last one on 3-June 2025.  Per daughter, patient missed her cycle on Betina-15 due to difficulty ambulation.  She follows up with Dr. Poly Keith (med onc), Dr. Jesse Villeda (rad onc), and Dr. Mauri Arroyo (breast surgeon)    - Palliative consulted, f/u recs  - Dr. Keith and Dr. Arroyo were notified via email.    Problem/Plan - 7:  ·  Problem: Chronic hyponatremia.   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    - CTM.    Problem/Plan - 8:  ·  Problem: Dementia.   ·  Plan: Patient has a history of dementia. A&Ox1 to person only, but daughter states that this is the baseline and denies any deterioration and confusion.    - CTM.    Problem/Plan - 9:  ·  Problem: History of cerebral meningioma.   ·  Plan: Daughter reports the patient had a brain mass that was removed in 2015.    Problem/Plan - 10:  ·  Problem: Hypertension.   ·  Plan; Patient has a history of left frontal meningioma that was resected in 2015    Patient has a history of hypertension. She was prescribed valsartan 40mg per surescripts but hasn't been taking it for weeks per daughter.  Blood pressure on admission was 90/61 went up to 137/58 after fluid bolus in the ED.    - hold home valsartan.    Problem/Plan - 11:  ·  Problem: Hyperlipidemia.   ·  Plan: Patient has a history of hyperlipidemia. She was prescribed atorvastatin 10mg per surescripts  but hasn't been taking it for weeks per daughter.    - hold home meds.    Problem/Plan - 12:  ·  Problem: Prophylactic measure.   ·  Plan: F: tolerating PO, no IVF  E: replete K<4, Mg<2  N: NPO    VTE Prophylaxis: None  GI: miralax  C: DNR/DNI  D: RMF.

## 2025-07-07 NOTE — OCCUPATIONAL THERAPY INITIAL EVALUATION ADULT - ADDITIONAL COMMENTS
Patient lethargic with decreased command following, daughter at bedside provided PLOF and social history. Patient lives with her daughter in an 3rd floor walkup. Patient required assistance with all ADL's, IADL's and functional mobility and no AD. The past couple of weeks patient has been mostly bedbound 2/2 weakness. Patient is R hand dominant and owns a rollator and RW. Patient's daughter assists with bathing in bed.

## 2025-07-07 NOTE — DIETITIAN INITIAL EVALUATION ADULT - OTHER INFO
Per H&P: 89y F with pmh Alzheimer's dementia, metastatic breast cancer, hypertension, stage 3a chronic kidney disease, hyperlipidemia, cerebral meningioma, hyponatremia, and vitamin D deficiency brought in by EMS for rectal bleeding. CTAP showed no GI bleeding. 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 Also with new weakness, CTH showed no acute intracranial pathology. Course complicated by hypernatremia to 163 which resolved with D5.     Patient seen at bedside for nutrition assessment. Current diet order: puree. Subjective information obtained from patient's family member at bedside. No known food allergies. Family member reports providing puree solids at home - seen by SLP who also recommends puree. Family member reports patient has had decreased appetite for ~2 weeks PTA which she reports is also when she noticed the bleeding sacrum. Says she is eating well in the hospital. Provided nutrition education - see below. Family agreeable to adding fortified foods and supplements - see below. Dosing weight: 147 pounds, BMI 29.7, denies recent weight loss. Noted with stage 2 pressure ulcer to sacrum, stage 3 pressure ulcer to BL buttocks, DTI to L heel, unstageable pressure ulcer to R ankle, stage 3 pressure ulcer to R rectum. +1 edema to BL ankles, +2 edema to L arm and hand. Noted with fecal incontinence, last BM 7/6 which was loose. Labs: BUN 34. Meds: D5% IVF, bowel regimen. Observed with no overt signs and symptoms of muscle or fat wasting. Based on ASPEN guidelines, patient does not meet criteria for malnutrition. See nutrition recommendations below.

## 2025-07-07 NOTE — DIETITIAN INITIAL EVALUATION ADULT - PROBLEM SELECTOR PLAN 8
Patient has a history of left frontal meningioma that was resected in 2015    Patient has a history of hypertension. She was prescribed valsartan 40mg per surescripts but hasn't been taking it for weeks per daughter.  Blood pressure on admission was 90/61 went up to 137/58 after fluid bolus in the ED.    - hold home valsartan

## 2025-07-07 NOTE — CONSULT NOTE ADULT - PROBLEM SELECTOR RECOMMENDATION 9
new funct quad in days prior to admission  - mgmt per primary team: r/o  Brain mets Vs stroke Vs medical induced neuropathy new funct quad in days prior to admission. pps 40%.   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

## 2025-07-07 NOTE — DIETITIAN INITIAL EVALUATION ADULT - PROBLEM SELECTOR PLAN 9
Patient has a history of hyperlipidemia. She was prescribed atorvastatin 10mg per surescripts  but hasn't been taking it for weeks per daughter.    - hold home meds

## 2025-07-07 NOTE — DIETITIAN INITIAL EVALUATION ADULT - PERSON TAUGHT/METHOD
Discussed importance of adequate protein, food sources of protein. Daughter verbalized understanding./verbal instruction/daughter instructed

## 2025-07-07 NOTE — PROGRESS NOTE ADULT - PROBLEM SELECTOR PLAN 12
F: tolerating PO, no IVF  E: replete K<4, Mg<2  N: NPO    VTE Prophylaxis: None  GI: miralax  C: DNR/DNI  D: RMF F: tolerating PO, no IVF  E: replete K<4, Mg<2  N: Pureed    VTE Prophylaxis: None  GI: miralax  C: DNR/DNI  D: RMF Plan: F: tolerating PO, no IVF  E: replete K<4, Mg<2  N: puree    VTE Prophylaxis: None  GI: miralax  C: DNR/DNI  D: RMF.

## 2025-07-07 NOTE — DIETITIAN INITIAL EVALUATION ADULT - PERTINENT LABORATORY DATA
07-07    142  |  112[H]  |  34[H]  ----------------------------<  134[H]  3.6   |  21[L]  |  0.87    Ca    7.6[L]      07 Jul 2025 07:00  Phos  2.9     07-07  Mg     1.8     07-07    TPro  6.6  /  Alb  1.9[L]  /  TBili  0.3  /  DBili  x   /  AST  17  /  ALT  11  /  AlkPhos  57  07-07  POCT Blood Glucose.: 176 mg/dL (07-06-25 @ 17:39)  A1C with Estimated Average Glucose Result: 6.1 % (07-06-25 @ 06:45)

## 2025-07-07 NOTE — CONSULT NOTE ADULT - PROBLEM SELECTOR RECOMMENDATION 7
.  Will continue to follow for ongoing GOC discussion / titration of palliative regimen. Emotional support provided, questions answered. Complex medical decision making / symptom management in the setting of     Active Psychosocial Referrals:  [x]Social Work/Case management []PT/OT [ ]Chaplaincy [ ]Hospice   []Holistic RN []Massage Therapy []Music Therapy []Ethics  Coping: [] well [] with difficulty [ ] poor coping [] unable to assess  Support system: [] strong [] adequate [ ] inadequate    For new or uncontrolled symptoms, please call Palliative Care at [phone] ([phone]). The service is available 24/7 (including nights & weekends) to provide symptom management recommendations over the phone as appropriate. - DNR DNI  - daughter Amara would be surrogate decision maker in the absence of a documented HCP  - GOC as above    In addition to the E/M visit, an advance care planning meeting was performed. Start time: 200; End time: 230; Total time: 30 min. A in-person meeting to discuss advance care planning was held today regarding: Melissa Adam    Primary decision maker:  Patient is unable to participate in decision making;  Alternate/surrogate: Melodie Randolph   . Discussed advance directives including, but not limited to, healthcare proxy and code status, as well as disease trajectory, patient's values/goals, and health care options that are available for end of life care. Decision regarding code status: DNR/DNI  Documentation completed today:  GOC note        Will continue to follow for ongoing GOC discussion / titration of palliative regimen. Emotional support provided, questions answered. Complex medical decision making / symptom management in the setting of br CA, AD     Active Psychosocial Referrals:  [x]Social Work/Case management [x]PT/OT [ ]Chaplaincy [ ]Hospice   []Holistic RN [x]Massage Therapy [x]Music Therapy []Ethics  Coping: [] well [] with difficulty [ ] poor coping [x] unable to assess  Support system: [x] strong [] adequate [ ] inadequate    For new or uncontrolled symptoms, please call Palliative Care at [phone] ([phone]). The service is available 24/7 (including nights & weekends) to provide symptom management recommendations over the phone as appropriate.

## 2025-07-07 NOTE — PROGRESS NOTE ADULT - PROBLEM SELECTOR PLAN 6
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 She was deemed as not a candidate for immunotherapy or surgery. She was started on chemotherapy and underwent 3 cycles, last one on 3-June 2025.  Per daughter, patient missed her cycle on Betina-15 due to difficulty ambulation.  She follows up with Dr. Poly Keith (med onc), Dr. Jesse Villeda (rad onc), and Dr. Mauri Arroyo (breast surgeon)    - Palliative consulted, f/u recs  - Dr. Keith and Dr. Arroyo were notified via email Patient's daughter was told that the patient has CKD but patient never experienced any problems with urination and has appropriate urine output.  On admission: Cr 1.59, , eGFR 30  Now: Cr .87 BUN 34, eGFR 63    - RAFFI resolved

## 2025-07-07 NOTE — CONSULT NOTE ADULT - PROBLEM SELECTOR RECOMMENDATION 6
- DNR DNI  - daughter Amara would be surrogate decision maker in the absence of a documented HCP    In addition to the E/M visit, an advance care planning meeting was performed. Start time: ; End time: ; Total time: min. A teleconference in-person meeting to discuss advance care planning was held today regarding:     Primary decision maker:  Patient is unable to participate in decision making;  Alternate/surrogate:    . Discussed advance directives including, but not limited to, healthcare proxy and code status, as well as disease trajectory, patient's values/goals, and health care options that are available for end of life care. Decision regarding code status: DNR/DNI FULL CODE; Documentation completed today: [facility] HCP Hospice Referral [facility] note - comorbidity supports limited life expectancy and long term prognosis  - Delirium precautions: bedside window with blinds open; frequent re-orientation; pictures of family if possible; minimize lines and physical restraints, nighttime awakenings as medically feasible; ensure bowel movements daily or every other day, monitor for urinary retention; avoid anticholinergic medications or benzodiazepines as clinically feasible.

## 2025-07-07 NOTE — CONSULT NOTE ADULT - ASSESSMENT
89F Alzheimer's dementia,  CKD, and stage IIIc triple-negative left breast cancer (s/p 3 cycles of neoadjuvant chemo) presented with rectal bleeding, hyponatremia/hypernatremia, RAFFI, urinary retention, and functional decline. Palliative consulted to discuss goals of care and treatment preferences in the setting of advanced illness. 89F Alzheimer's dementia,  CKD, and stage IIIc triple-negative left breast cancer (s/p 3 cycles of neoadjuvant chemo) pw bleeding sacral ulcer, hyponatremia/hypernatremia, RAFFI, urinary retention, and functional decline. Palliative consulted to discuss goals of care and treatment preferences in the setting of advanced illness.    GOC as above.  Recommend discussion between Melodie and patient's oncologist to clarify prognosis, treatment options (or lack thereof), and expected trajectory. This will help Melodie make informed decisions aligned with her mother's likely wishes and best interests. Introduced home hospice. Discussions to continue as hospital course progresses.

## 2025-07-07 NOTE — DIETITIAN INITIAL EVALUATION ADULT - PROBLEM SELECTOR PLAN 2
Patient has a history of CKD stage 3a and hyponatremia (possibly 2/2 SIADH per outpatient nephro note).  Sodium was 157 on admission, went up to 165 after the administration of 2L of NS.  Patient has 4.3L free water deficit    - 97 ml/hr D5W  - repeat BMP to trend sodium q8  - bladder scan for PVR q6h

## 2025-07-07 NOTE — PROGRESS NOTE ADULT - PROBLEM SELECTOR PLAN 3
Patient's daughter started noticing blood in the stool for the past 4 days while changing her diapers. Initially, GI bleed was suspected, but CT angio of abdomen/pelvis was normal and patient's hgb was 12.1 but on examination patient has an oozing sacral decubitus ulcer on examination. Patient has been having difficulty walking due to leg swelling and has been bed bound recently.  Bleeding is likely 2/2 sacral decubitus ulcer.    - wound care consulted  - HSV swab PND  - DVT ppx resumed Patient's daughter started noticing blood in the stool for the past 4 days while changing her diapers. Initially, GI bleed was suspected, but CT angio of abdomen/pelvis was normal and patient's hgb was 12.1 but on examination patient has an oozing sacral decubitus ulcer on examination. Patient has been having difficulty walking due to leg swelling and has been bed bound recently.  Bleeding is likely 2/2 sacral decubitus ulcer.  HSV 2 positive    - wound care consulted  - DVT ppx resumed Patient's daughter started noticing blood in the stool for the past 4 days while changing her diapers. Initially, GI bleed was suspected, but CT angio of abdomen/pelvis was normal and patient's hgb was 12.1 but on examination patient has an oozing sacral decubitus ulcer on examination. Patient has been having difficulty walking due to leg swelling and has been bed bound recently.  Bleeding is likely 2/2 sacral decubitus ulcer.  HSV 2 positive    - awaiting wound care recs  - DVT ppx resumed Decubital ulcer PCR positive for HSV2  - Start acycolvir 1g PO BID x 7 days  - neuro c/f possible elsberg syndrome, awaiting MRI brain and L-spine  - ID consult ordered for possible IV acycolvir, appreciate recs Decubital ulcer PCR positive for HSV2  - Start valacyclovir 1g PO BID x 7 days  - neuro c/f possible elsberg syndrome, awaiting MRI brain and L-spine  - ID consult ordered for possible IV acycolvir, trish recs

## 2025-07-07 NOTE — CONSULT NOTE ADULT - ASSESSMENT
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 Neurology is currently following and is concerned for myeloradiculitis (Elsberg Syndrome)  which could be caused by HSV given patient's presentation of the ulcer and new onset BLE weakness. Patient has remained afebrile during admission with resolved leukocytosis (11.98 -> 9.93), UCx 3 organisms (likely contaminant), and BCx NGTD @ 48. On exam, difficult to assess neurologically due to patient's baseline mental status and waxing/waning cooperation. Lungs ctab, abd soft ntnd, 1+ pitting edema BLE, no nuchal rigidity or pain on palpation. Unclear if patient having urinary or bowel incontinence, she uses diapers at home and daughter reports patient does alert when urinating/defecating. Due to concern for myeloradiculitis, will start antivirals while pending further imaging and monitor patient's clinical status for improvement. Can consider LP in the future if within GOC, palliative is following and daughter would like to further discuss patient's prognosis with oncology.    - start acyclovir 700mg q12h (renally dosed)   - will need to pre-hydrate with 250cc NS before each dose   - f/u MRI head & L-spine   - consider LP if within GOC (complete imaging first)     ID team 1 will follow.   Case discussed with Dr. Dorsey.

## 2025-07-07 NOTE — CONSULT NOTE ADULT - CONVERSATION DETAILS
Patient with AD and unable to participate in GOC. Met with patient's daughter at bedside for discussion of GOC, treatment preferences and hospice benefit.     Expressed concern for patient's overall decline after receiving several cycles of chemo for triple negative breast cancer. I expressed concern for her overall prognosis in light of baseline hx of Alzheimer's, her decreased PO intake complicated by RAFFI, poor functional status, and inability to work with PT in a meaningful way. We reviewed primary team plan of care (dw Dr. Geronimo prior) including neuro cs, MRI brain and L spine +/- ID cs for further work up of  weakness.     Melodie was unaware that there were fewer treatment options available for triple negative breast cancer and that patient would not be a candidate for surgical resection if she is unable to complete infusions. She would like more feedback about patient's prognosis and trajectory from patient's oncologist.     DNR DNI order is in place. What is most important right now is continuing comprehensive work up. Melodie understanding of the burdens of being a PCG, though it is a priority for her to avoid placing her mother in a NH. She is interested to learn more about other support/resources for home to improve her mother's comfort.     We discussed philosophy and benefits of home hospice. Explained that hospice focuses on providing comfort and managing distressing symptoms with the goal to minimize suffering and enhance patient's quality of life. Enrollment in hospice would help to avoid frequent hospital visits and admissions, which can be disruptive and uncomfortable for the patient.  It allows patient to be home in a familiar environment, which can be comforting for patients, reduce stress, and help maintain a sense of normalcy.     Discussions to continue as hospital course progresses.

## 2025-07-07 NOTE — PROGRESS NOTE ADULT - SUBJECTIVE AND OBJECTIVE BOX
**incomplete    INTERVAL/OVERNIGHT EVENTS: None    SUBJECTIVE:  Patient seen and examined at bedside, comfortable. AOx1, baseline for pt. following commands, no focal deficits.     Vital Signs Last 12 Hrs  T(F): 98.6 (07-06-25 @ 11:44), Max: 98.6 (07-06-25 @ 11:44)  HR: 92 (07-06-25 @ 11:44) (85 - 92)  BP: 138/66 (07-06-25 @ 11:44) (132/50 - 138/66)  BP(mean): 90 (07-06-25 @ 11:44) (78 - 90)  RR: 18 (07-06-25 @ 11:44) (18 - 18)  SpO2: 98% (07-06-25 @ 11:44) (95% - 98%)  I&O's Summary    05 Jul 2025 07:01  -  06 Jul 2025 07:00  --------------------------------------------------------  IN: 0 mL / OUT: 1350 mL / NET: -1350 mL    06 Jul 2025 07:01  -  06 Jul 2025 14:01  --------------------------------------------------------  IN: 0 mL / OUT: 400 mL / NET: -400 mL        PHYSICAL EXAM:  General: NAD  HEENT: PERRL, EOM intact, sclera anicteric, MMM  Cardiovascular: RRR; no MRG;   Respiratory: CTAB; no WRR  GI/: soft; NTND; BS x4  Extremities: WWP; 2+ peripheral pulses bilaterally; no LE edema  Skin: normal color & turgor; no rash  Neurologic: aox3; no focal deficits    LABS:                        9.8    11.98 )-----------( 294      ( 06 Jul 2025 06:45 )             34.9     07-06    152[H]  |  123[H]  |  82[H]  ----------------------------<  160[H]  3.8   |  21[L]  |  1.10    Ca    8.0[L]      06 Jul 2025 06:45  Phos  2.6     07-06  Mg     2.3     07-06    TPro  6.9  /  Alb  2.3[L]  /  TBili  0.4  /  DBili  x   /  AST  20  /  ALT  12  /  AlkPhos  53  07-06    PT/INR - ( 05 Jul 2025 04:38 )   PT: 13.0 sec;   INR: 1.11          PTT - ( 05 Jul 2025 04:38 )  PTT:35.7 sec  Urinalysis Basic - ( 06 Jul 2025 06:45 )    Color: x / Appearance: x / SG: x / pH: x  Gluc: 160 mg/dL / Ketone: x  / Bili: x / Urobili: x   Blood: x / Protein: x / Nitrite: x   Leuk Esterase: x / RBC: x / WBC x   Sq Epi: x / Non Sq Epi: x / Bacteria: x          RADIOLOGY & ADDITIONAL TESTS:    MEDICATIONS  (STANDING):  dextrose 5%. 1000 milliLiter(s) (175 mL/Hr) IV Continuous <Continuous>  dextrose 5%. 1000 milliLiter(s) (100 mL/Hr) IV Continuous <Continuous>  dextrose 5%. 1000 milliLiter(s) (50 mL/Hr) IV Continuous <Continuous>  dextrose 50% Injectable 25 Gram(s) IV Push once  dextrose 50% Injectable 12.5 Gram(s) IV Push once  dextrose 50% Injectable 25 Gram(s) IV Push once  enoxaparin Injectable 40 milliGRAM(s) SubCutaneous every 24 hours  glucagon  Injectable 1 milliGRAM(s) IntraMuscular once  polyethylene glycol 3350 17 Gram(s) Oral every 12 hours  senna 2 Tablet(s) Oral at bedtime    MEDICATIONS  (PRN):  acetaminophen     Tablet .. 650 milliGRAM(s) Oral every 6 hours PRN Temp greater or equal to 38C (100.4F), Mild Pain (1 - 3)  aluminum hydroxide/magnesium hydroxide/simethicone Suspension 30 milliLiter(s) Oral every 4 hours PRN Dyspepsia  benzonatate 100 milliGRAM(s) Oral three times a day PRN Cough  dextrose Oral Gel 15 Gram(s) Oral once PRN Blood Glucose LESS THAN 70 milliGRAM(s)/deciliter  melatonin 3 milliGRAM(s) Oral at bedtime PRN Insomnia   **incomplete    INTERVAL/OVERNIGHT EVENTS: None    SUBJECTIVE:  Patient seen and examined at bedside, comfortable and sleeping. AOx1, baseline for pt. following commands, no focal deficits.     Vital Signs Last 12 Hrs  T(F): 98.6 (07-06-25 @ 11:44), Max: 98.6 (07-06-25 @ 11:44)  HR: 92 (07-06-25 @ 11:44) (85 - 92)  BP: 138/66 (07-06-25 @ 11:44) (132/50 - 138/66)  BP(mean): 90 (07-06-25 @ 11:44) (78 - 90)  RR: 18 (07-06-25 @ 11:44) (18 - 18)  SpO2: 98% (07-06-25 @ 11:44) (95% - 98%)  I&O's Summary    05 Jul 2025 07:01  -  06 Jul 2025 07:00  --------------------------------------------------------  IN: 0 mL / OUT: 1350 mL / NET: -1350 mL    06 Jul 2025 07:01  -  06 Jul 2025 14:01  --------------------------------------------------------  IN: 0 mL / OUT: 400 mL / NET: -400 mL        PHYSICAL EXAM:  General: NAD  HEENT: PERRL, sclera anicteric, MMM  Cardiovascular: RRR; no MRG;   Respiratory: CTAB; no WRR  GI/: soft; NTND; BS x4  Extremities: WWP; 2+ peripheral pulses bilaterally; no LE edema  Skin: normal color & turgor; no rash  Neurologic: aox1; no focal deficits    LABS:                        9.8    11.98 )-----------( 294      ( 06 Jul 2025 06:45 )             34.9     07-06    152[H]  |  123[H]  |  82[H]  ----------------------------<  160[H]  3.8   |  21[L]  |  1.10    Ca    8.0[L]      06 Jul 2025 06:45  Phos  2.6     07-06  Mg     2.3     07-06    TPro  6.9  /  Alb  2.3[L]  /  TBili  0.4  /  DBili  x   /  AST  20  /  ALT  12  /  AlkPhos  53  07-06    PT/INR - ( 05 Jul 2025 04:38 )   PT: 13.0 sec;   INR: 1.11          PTT - ( 05 Jul 2025 04:38 )  PTT:35.7 sec  Urinalysis Basic - ( 06 Jul 2025 06:45 )    Color: x / Appearance: x / SG: x / pH: x  Gluc: 160 mg/dL / Ketone: x  / Bili: x / Urobili: x   Blood: x / Protein: x / Nitrite: x   Leuk Esterase: x / RBC: x / WBC x   Sq Epi: x / Non Sq Epi: x / Bacteria: x          RADIOLOGY & ADDITIONAL TESTS:    MEDICATIONS  (STANDING):  dextrose 5%. 1000 milliLiter(s) (175 mL/Hr) IV Continuous <Continuous>  dextrose 5%. 1000 milliLiter(s) (100 mL/Hr) IV Continuous <Continuous>  dextrose 5%. 1000 milliLiter(s) (50 mL/Hr) IV Continuous <Continuous>  dextrose 50% Injectable 25 Gram(s) IV Push once  dextrose 50% Injectable 12.5 Gram(s) IV Push once  dextrose 50% Injectable 25 Gram(s) IV Push once  enoxaparin Injectable 40 milliGRAM(s) SubCutaneous every 24 hours  glucagon  Injectable 1 milliGRAM(s) IntraMuscular once  polyethylene glycol 3350 17 Gram(s) Oral every 12 hours  senna 2 Tablet(s) Oral at bedtime    MEDICATIONS  (PRN):  acetaminophen     Tablet .. 650 milliGRAM(s) Oral every 6 hours PRN Temp greater or equal to 38C (100.4F), Mild Pain (1 - 3)  aluminum hydroxide/magnesium hydroxide/simethicone Suspension 30 milliLiter(s) Oral every 4 hours PRN Dyspepsia  benzonatate 100 milliGRAM(s) Oral three times a day PRN Cough  dextrose Oral Gel 15 Gram(s) Oral once PRN Blood Glucose LESS THAN 70 milliGRAM(s)/deciliter  melatonin 3 milliGRAM(s) Oral at bedtime PRN Insomnia   INTERVAL/OVERNIGHT EVENTS: None    SUBJECTIVE:  Patient seen and examined at bedside, comfortable and sleeping. AOx1, baseline for pt. following commands, no focal deficits.     Vital Signs Last 12 Hrs  T(F): 98.6 (07-06-25 @ 11:44), Max: 98.6 (07-06-25 @ 11:44)  HR: 92 (07-06-25 @ 11:44) (85 - 92)  BP: 138/66 (07-06-25 @ 11:44) (132/50 - 138/66)  BP(mean): 90 (07-06-25 @ 11:44) (78 - 90)  RR: 18 (07-06-25 @ 11:44) (18 - 18)  SpO2: 98% (07-06-25 @ 11:44) (95% - 98%)  I&O's Summary    05 Jul 2025 07:01  -  06 Jul 2025 07:00  --------------------------------------------------------  IN: 0 mL / OUT: 1350 mL / NET: -1350 mL    06 Jul 2025 07:01  -  06 Jul 2025 14:01  --------------------------------------------------------  IN: 0 mL / OUT: 400 mL / NET: -400 mL        PHYSICAL EXAM:  General: NAD  HEENT: PERRL, sclera anicteric, MMM  Cardiovascular: RRR; no MRG;   Respiratory: CTA b/l; no w/r/r  GI/: soft; NTND; BS x4  Extremities: WWP; 2+ peripheral pulses bilaterally; no LE edema  Skin: normal color & turgor; no rash  Neurologic: aox1; no focal deficits    LABS:                        9.8    11.98 )-----------( 294      ( 06 Jul 2025 06:45 )             34.9     07-06    152[H]  |  123[H]  |  82[H]  ----------------------------<  160[H]  3.8   |  21[L]  |  1.10    Ca    8.0[L]      06 Jul 2025 06:45  Phos  2.6     07-06  Mg     2.3     07-06    TPro  6.9  /  Alb  2.3[L]  /  TBili  0.4  /  DBili  x   /  AST  20  /  ALT  12  /  AlkPhos  53  07-06    PT/INR - ( 05 Jul 2025 04:38 )   PT: 13.0 sec;   INR: 1.11          PTT - ( 05 Jul 2025 04:38 )  PTT:35.7 sec  Urinalysis Basic - ( 06 Jul 2025 06:45 )    Color: x / Appearance: x / SG: x / pH: x  Gluc: 160 mg/dL / Ketone: x  / Bili: x / Urobili: x   Blood: x / Protein: x / Nitrite: x   Leuk Esterase: x / RBC: x / WBC x   Sq Epi: x / Non Sq Epi: x / Bacteria: x          RADIOLOGY & ADDITIONAL TESTS:    MEDICATIONS  (STANDING):  dextrose 5%. 1000 milliLiter(s) (175 mL/Hr) IV Continuous <Continuous>  dextrose 5%. 1000 milliLiter(s) (100 mL/Hr) IV Continuous <Continuous>  dextrose 5%. 1000 milliLiter(s) (50 mL/Hr) IV Continuous <Continuous>  dextrose 50% Injectable 25 Gram(s) IV Push once  dextrose 50% Injectable 12.5 Gram(s) IV Push once  dextrose 50% Injectable 25 Gram(s) IV Push once  enoxaparin Injectable 40 milliGRAM(s) SubCutaneous every 24 hours  glucagon  Injectable 1 milliGRAM(s) IntraMuscular once  polyethylene glycol 3350 17 Gram(s) Oral every 12 hours  senna 2 Tablet(s) Oral at bedtime    MEDICATIONS  (PRN):  acetaminophen     Tablet .. 650 milliGRAM(s) Oral every 6 hours PRN Temp greater or equal to 38C (100.4F), Mild Pain (1 - 3)  aluminum hydroxide/magnesium hydroxide/simethicone Suspension 30 milliLiter(s) Oral every 4 hours PRN Dyspepsia  benzonatate 100 milliGRAM(s) Oral three times a day PRN Cough  dextrose Oral Gel 15 Gram(s) Oral once PRN Blood Glucose LESS THAN 70 milliGRAM(s)/deciliter  melatonin 3 milliGRAM(s) Oral at bedtime PRN Insomnia

## 2025-07-07 NOTE — CONSULT NOTE ADULT - TIME BILLING
motion. Tenderness (Right posterior neck) present. No rigidity.      Right lower leg: No edema.      Left lower leg: No edema.   Neurological:      Mental Status: He is alert and oriented to person, place, and time.         LAB DATA REVIEWED:    Recent Results (from the past 12 hour(s))   CBC with Auto Differential    Collection Time: 10/31/24  5:18 PM   Result Value Ref Range    WBC 7.8 4.1 - 11.1 K/uL    RBC 4.15 4.10 - 5.70 M/uL    Hemoglobin 12.9 12.1 - 17.0 g/dL    Hematocrit 38.4 36.6 - 50.3 %    MCV 92.5 80.0 - 99.0 FL    MCH 31.1 26.0 - 34.0 PG    MCHC 33.6 30.0 - 36.5 g/dL    RDW 13.2 11.5 - 14.5 %    Platelets 192 150 - 400 K/uL    MPV 9.3 8.9 - 12.9 FL    Nucleated RBCs 0.0 0  WBC    nRBC 0.00 0.00 - 0.01 K/uL    Neutrophils % 74 32 - 75 %    Lymphocytes % 15 12 - 49 %    Monocytes % 8 5 - 13 %    Eosinophils % 1 0 - 7 %    Basophils % 1 0 - 1 %    Immature Granulocytes % 1 (H) 0.0 - 0.5 %    Neutrophils Absolute 5.9 1.8 - 8.0 K/UL    Lymphocytes Absolute 1.1 0.8 - 3.5 K/UL    Monocytes Absolute 0.6 0.0 - 1.0 K/UL    Eosinophils Absolute 0.0 0.0 - 0.4 K/UL    Basophils Absolute 0.0 0.0 - 0.1 K/UL    Immature Granulocytes Absolute 0.0 0.00 - 0.04 K/UL    Differential Type AUTOMATED     Comprehensive Metabolic Panel    Collection Time: 10/31/24  5:18 PM   Result Value Ref Range    Sodium 136 136 - 145 mmol/L    Potassium 4.0 3.5 - 5.1 mmol/L    Chloride 101 97 - 108 mmol/L    CO2 29 21 - 32 mmol/L    Anion Gap 6 2 - 12 mmol/L    Glucose 280 (H) 65 - 100 mg/dL    BUN 18 6 - 20 MG/DL    Creatinine 0.87 0.70 - 1.30 MG/DL    BUN/Creatinine Ratio 21 (H) 12 - 20      Est, Glom Filt Rate >90 >60 ml/min/1.73m2    Calcium 8.5 8.5 - 10.1 MG/DL    Total Bilirubin 0.4 0.2 - 1.0 MG/DL    ALT 21 12 - 78 U/L    AST 22 15 - 37 U/L    Alk Phosphatase 91 45 - 117 U/L    Total Protein 6.8 6.4 - 8.2 g/dL    Albumin 3.3 (L) 3.5 - 5.0 g/dL    Globulin 3.5 2.0 - 4.0 g/dL    Albumin/Globulin Ratio 0.9 (L) 1.1 - 2.2   
HSV-2
Emotional Support/Supportive Care and Clarification of Potential Disease Trajectory related to AD, breast CA  Assessment of Symptom [place] and Palliative regimen  Exploration of GOC/Advanced Directives including HCP designation, code status, and hospice eligibility    Time exclusive of ACP discussion  Time inclusive of chart review, medication ordering, discussion with primary team, clinical documentation, and communication with family/caregiver

## 2025-07-07 NOTE — DIETITIAN INITIAL EVALUATION ADULT - PROBLEM SELECTOR PLAN 4
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 She was deemed as not a candidate for immunotherapy or surgery. She was started on chemotherapy and underwent 3 cycles, last one on 3-June 2025.  Per daughter, patient missed her cycle on [facility]-15 due to difficulty ambulation.  She follows up with Dr. Poly Keith (med onc), Dr. Jesse Villeda (rad onc), and Dr. Mauri Arroyo (breast surgeon)    - Palliative consulted, f/u recs  - Dr. Keith and Dr. Arroyo were notified via email

## 2025-07-07 NOTE — CONSULT NOTE ADULT - SUBJECTIVE AND OBJECTIVE BOX
[facility]  Christine Santacruz Geriatrics & Palliative Care NP  Contact Info: Call [phone] (HEAL Line) or message on Microsoft Teams    HPI:  89y F with pmh dementia, metastatic breast cancer, hypertension, stage 3a chronic kidney disease, hyperlipidemia, cerebral meningioma, hyponatremia, and vitamin D deficiency brought in by EMS for rectal bleeding.  The patient's daughter reports noticing blood (maroon and dark in nature) in the stool for the past 4 days and denies associated nausea, vomiting, or diarrhea. The daughter reports that the patient had an ulcer on her lower back for 2 weeks and that she occasionally scratches it. The patient hasn't been eating for the past week and has been given ensure and soup by he daughter.  The patient was started on CMF chemotherapy regimen received 3 cycles of treatment  with the last one being on Betina-3-2025. She was scheduled for her next cycle on Betina 15 but was unable to attend to it as she has been having difficulty ambulating and became bedbound since that time,  She had rectal bleeding in 2021 and underwent colonoscopy which showed multiple polyps, all of which were removed.  She follows up with Dr. Tyson Schumacher (PCP), Dr. Poly Keith (med onc), Dr. Jesse Villeda (rad onc), and Dr. Mauri Arroyo (breast surgeon)      In the ED:  Vitals: T 97.9, BP 90/61, HR 96, RR 20, SpO2 98% (RA)  Labs: MCHC 28.6 (H), smudge cells, lactate 2.4, sodium 157, , Cr 1.59, eGFR 30, glucose 124, protein total 9.4 , albumin 2.7, Mg 3.1  Urine: cloudy, protein 30, RBC 99, WBC 7 , moderate leukocyte esterase, few bacteria  CXR: normal  Imaging: CT scan showed no evidence of active gastrointestinal bleeding. Partially visualized left breast mass largest measuring up to 4.7cm, and increase in size  2L fluids (05 Jul 2025 09:01)      PAST MEDICAL & SURGICAL HISTORY:  Alzheimer's disease  Alzheimer's dementia      Hyperlipidemia  Hyperlipidemia      Essential hypertension  HTN (hypertension)      Benign neoplasm of cerebral meninges  Left frontal lobe      Breast cancer          FAMILY HISTORY:   Reviewed; no history of     in mother or father    Opiate Naive (Y/N):   iStop reviewed (Y/N): Yes.   No Rx found on iStop review (Ref#:           Items that are not checked are not present  PSYCHOSOCIAL ASSESSMENT:  - Significant other/partner: [  ]  Children: [  ]  - Living Situation: Home [  ] Long term care [  ]  Rehab[  ]  Other[  ]  - Support system: strong[  ] adequate[  ] inadequate[  ]  - Gnosticist/Spiritual practice: deferred   - Role of organized Mandaeism important [  ] some [  ] unable to assess dt pt mentation [  ]  - Coping: well[  ]  with difficulty[  ]  poor coping[  ]  unable to assess dt pt mentation [  ]    ADVANCE DIRECTIVES:    - MOLST[  ]     - Living Will [  ]     DECISION MAKER(s):  - Health Care Proxy(s) [  ]  Surrogate(s)[  ] Guardian[  ]           Name(s)/Phone Number(s):     BASELINE (I)ADLs (prior to admission):  - [place]:  Total[  ] Moderate[  ] Dependent[  ]    Allergies    No Known Allergies    Intolerances        Medications:      MEDICATIONS  (STANDING):  dextrose 5%. 1000 milliLiter(s) (175 mL/Hr) IV Continuous <Continuous>  dextrose 5%. 1000 milliLiter(s) (100 mL/Hr) IV Continuous <Continuous>  dextrose 5%. 1000 milliLiter(s) (50 mL/Hr) IV Continuous <Continuous>  dextrose 50% Injectable 25 Gram(s) IV Push once  dextrose 50% Injectable 12.5 Gram(s) IV Push once  dextrose 50% Injectable 25 Gram(s) IV Push once  enoxaparin Injectable 40 milliGRAM(s) SubCutaneous every 24 hours  glucagon  Injectable 1 milliGRAM(s) IntraMuscular once  polyethylene glycol 3350 17 Gram(s) Oral every 12 hours  senna 2 Tablet(s) Oral at bedtime  valACYclovir 1000 milliGRAM(s) Oral two times a day    MEDICATIONS  (PRN):  acetaminophen     Tablet .. 650 milliGRAM(s) Oral every 6 hours PRN Temp greater or equal to 38C (100.4F), Mild Pain (1 - 3)  aluminum hydroxide/magnesium hydroxide/simethicone Suspension 30 milliLiter(s) Oral every 4 hours PRN Dyspepsia  benzonatate 100 milliGRAM(s) Oral three times a day PRN Cough  dextrose Oral Gel 15 Gram(s) Oral once PRN Blood Glucose LESS THAN 70 milliGRAM(s)/deciliter  melatonin 3 milliGRAM(s) Oral at bedtime PRN Insomnia      24 hour PRN use:          PRESENT SYMPTOMS:   [ ] Patient unable to self report   Source if other than patient:  [ ]Family   [ ]Team     Pain [  ]  Location :        Quality:  Radiation:  Timing:  Aggravating factors:  Minimal acceptable level (0-10 scale):   Severity in last 24h (0-10 scale) :  Current score (0-10 scale):  Improves with:     PAIN AD Score:   [email or web address] (press ctrl +  left click to view)    If [  ], pt denies symptom.   Dyspnea:         [  ]  Anxiety:           [  ]  Difficulty sleeping: [  ]  Fatigue:           [  ]  Nausea:           [  ]  Loss of appetite:     [  ]  Dysphagia: [  ]  Constipation:   [  ]        LBM   Other Symptoms:    All other review of systems negative [  ]    ECOG Performance:       Current Palliative Performance Scale/Karnofsky Score:    %  Preadmit Karnofsky:   %          PEx:  General:   alert  oriented x       lethargic, distressed, cachexia,  nonverbal,  unarousable, verbal  NEURO:  No deficits, cognitive impairment, encephalopathic dsyphagia dysarthria paresis  Behavioral: Anxiety  Delirium Cooperative  HEENT:  No temporal wasting,  No dry mouth,  ET tube/trach  RESP: Reg rhythm, No  tachypnea/labored breathing,  CTAB, diminished bilat bases  CV:  No  tachycardia  GI: soft non distended non tender    MUSK: weakness x4,  edema, ambulatory with assistance,   bedbound  SKIN:  Poor skin turgor, Pallor, Pressure ulcer stage:       T(C): 36.6 (07-07-25 @ 05:51), Max: 37 (07-06-25 @ 11:44)  HR: 94 (07-07-25 @ 05:51) (83 - 94)  BP: 125/64 (07-07-25 @ 05:51) (123/67 - 138/66)  RR: 18 (07-07-25 @ 05:51) (18 - 18)  SpO2: 95% (07-07-25 @ 05:51) (95% - 98%)  Wt(kg): --    Labs:    CBC:                        9.1    9.93  )-----------( 278      ( 07 Jul 2025 07:00 )             31.0     CMP:    07-07    142  |  112[H]  |  34[H]  ----------------------------<  134[H]  3.6   |  21[L]  |  0.87    Ca    7.6[L]      07 Jul 2025 07:00  Phos  2.9     07-07  Mg     1.8     07-07    TPro  6.6  /  Alb  1.9[L]  /  TBili  0.3  /  DBili  x   /  AST  17  /  ALT  11  /  AlkPhos  57  07-07       Urinalysis Basic - ( 07 Jul 2025 07:00 )    Color: x / Appearance: x / SG: x / pH: x  Gluc: 134 mg/dL / Ketone: x  / Bili: x / Urobili: x   Blood: x / Protein: x / Nitrite: x   Leuk Esterase: x / RBC: x / WBC x   Sq Epi: x / Non Sq Epi: x / Bacteria: x        RADIOLOGY & ADDITIONAL STUDIES:  Imaging:  Reviewed      GO discussion:  [facility]  Christine Santacruz, Geriatrics & Palliative Care NP  Contact Info: Call [phone] (HEAL Line) or message on Microsoft Teams    HPI:  89y F with pmh dementia, metastatic breast cancer, hypertension, stage 3a chronic kidney disease, hyperlipidemia, cerebral meningioma, hyponatremia, and vitamin D deficiency brought in by EMS for rectal bleeding.  The patient's daughter reports noticing blood (maroon and dark in nature) in the stool for the past 4 days and denies associated nausea, vomiting, or diarrhea. The daughter reports that the patient had an ulcer on her lower back for 2 weeks and that she occasionally scratches it. The patient hasn't been eating for the past week and has been given ensure and soup by he daughter.  The patient was started on CMF chemotherapy regimen received 3 cycles of treatment  with the last one being on Betina-3-2025. She was scheduled for her next cycle on Betina 15 but was unable to attend to it as she has been having difficulty ambulating and became bedbound since that time,  She had rectal bleeding in 2021 and underwent colonoscopy which showed multiple polyps, all of which were removed.  She follows up with Dr. Tyson Schumacher (PCP), Dr. Poly Keith (med onc), Dr. Jesse Villeda (rad onc), and Dr. Mauri Arroyo (breast surgeon)  Imaging: CT scan showed no evidence of active gastrointestinal bleeding. Partially visualized left breast mass largest measuring up to 4.7cm, and increase in size  2L fluids (05 Jul 2025 09:01)    Hospital course, primary team, and consultant notes reviewed. Patient seen and examined, lying in bed overtly comfortable. encephalopathic (ox1 at baseline) and unable to engage in ROS in meaningful way. Comprehensive ROS as noted below, collateral obtained from chart and patient's daughter and PCG, Melodie. Exploration of GOC documented as below.    PAST MEDICAL & SURGICAL HISTORY:  Alzheimer's disease  Alzheimer's dementia  Hyperlipidemia  Hyperlipidemia  Essential hypertension  HTN (hypertension)  Benign neoplasm of cerebral meninges  Left frontal lobe  Breast cancer    FAMILY HISTORY:   Reviewed; no history of     in mother or father    Opiate Naive (Y/N):   iStop reviewed (Y/N): Yes.   No Rx found on iStop review (Ref#:           Items that are not checked are not present  PSYCHOSOCIAL ASSESSMENT:  - Significant other/partner: [x  ]  Children: [  x]  - Living Situation: Home [ x ] Long term care [  ]  Rehab[  ]  Other[  ]  - Support system: strong[ x ] adequate[  ] inadequate[  ]  - Voodoo/Spiritual practice: deferred   - Role of organized Hoahaoism important [  ] some [  ] unable to assess dt pt mentation [x  ]  - Coping: well[  ]  with difficulty[  ]  poor coping[  ]  unable to assess dt pt mentation [ x ]    ADVANCE DIRECTIVES:    - MOLST[  ]     - Living Will [  ]     DECISION MAKER(s):  - Health Care Proxy(s) [  ]  Surrogate(s)[  ] Guardian[  ]           Name(s)/Phone Number(s):   - Daughter Melodie Matt would be surrogate decision maker in the absence of a documented HCP    BASELINE (I)ADLs (prior to admission):  - [place]:  Total[  ] Moderate[  ] Dependent[x  ]    Allergies    No Known Allergies    Intolerances        Medications:      MEDICATIONS  (STANDING):  dextrose 5%. 1000 milliLiter(s) (175 mL/Hr) IV Continuous <Continuous>  dextrose 5%. 1000 milliLiter(s) (100 mL/Hr) IV Continuous <Continuous>  dextrose 5%. 1000 milliLiter(s) (50 mL/Hr) IV Continuous <Continuous>  dextrose 50% Injectable 25 Gram(s) IV Push once  dextrose 50% Injectable 12.5 Gram(s) IV Push once  dextrose 50% Injectable 25 Gram(s) IV Push once  enoxaparin Injectable 40 milliGRAM(s) SubCutaneous every 24 hours  glucagon  Injectable 1 milliGRAM(s) IntraMuscular once  polyethylene glycol 3350 17 Gram(s) Oral every 12 hours  senna 2 Tablet(s) Oral at bedtime  valACYclovir 1000 milliGRAM(s) Oral two times a day    MEDICATIONS  (PRN):  acetaminophen     Tablet .. 650 milliGRAM(s) Oral every 6 hours PRN Temp greater or equal to 38C (100.4F), Mild Pain (1 - 3)  aluminum hydroxide/magnesium hydroxide/simethicone Suspension 30 milliLiter(s) Oral every 4 hours PRN Dyspepsia  benzonatate 100 milliGRAM(s) Oral three times a day PRN Cough  dextrose Oral Gel 15 Gram(s) Oral once PRN Blood Glucose LESS THAN 70 milliGRAM(s)/deciliter  melatonin 3 milliGRAM(s) Oral at bedtime PRN Insomnia      24 hour PRN use:          PRESENT SYMPTOMS:   [x ] Patient unable to self report   Source if other than patient:  [x ]Family   [x ]Team     Pain [  ]  PAIN AD Score: 0  [email or web address] (press ctrl +  left click to view)    If [  ], pt denies symptom.   Dyspnea:         [  ]  Anxiety:           [  ]  Difficulty sleeping: [  ]  Fatigue:           [ x ]  Nausea:           [  ]  Loss of appetite:     [ x ]  Dysphagia: [  ]  Constipation:   [  ]        Other Symptoms:    All other review of systems negative [x  ]    ECOG Performance:     3  Current Palliative Performance Scale/Karnofsky Score:  40  %  Preadmit Karnofsky:  50  %          PEx:  General: tired appearing older woman sitting up in bed, NAD  oriented x    1   lethargic, minimally verbal  NEURO:  +cognitive impairment,+ encephalopathic No dsyphagia dysarthria paresis  Behavioral: Withdrawn   HEENT:  No temporal wasting,  No dry mouth  RESP: Reg rhythm, No  tachypnea/labored breathing,  CTAB, diminished bilat bases  CV:  No  tachycardia  GI: soft non distended non tender    MUSK: weakness x4, No edema,  non ambulatory   SKIN:  Poor skin turgor, Pallor, Pressure ulcer stage: multiple pressure wounds, DTE see primary team comprehensive assessment; skin assessment deferred for patient comfort     T(C): 36.6 (07-07-25 @ 05:51), Max: 37 (07-06-25 @ 11:44)  HR: 94 (07-07-25 @ 05:51) (83 - 94)  BP: 125/64 (07-07-25 @ 05:51) (123/67 - 138/66)  RR: 18 (07-07-25 @ 05:51) (18 - 18)  SpO2: 95% (07-07-25 @ 05:51) (95% - 98%)  Wt(kg): --    Labs:    CBC:                        9.1    9.93  )-----------( 278      ( 07 Jul 2025 07:00 )             31.0     CMP:    07-07    142  |  112[H]  |  34[H]  ----------------------------<  134[H]  3.6   |  21[L]  |  0.87    Ca    7.6[L]      07 Jul 2025 07:00  Phos  2.9     07-07  Mg     1.8     07-07    TPro  6.6  /  Alb  1.9[L]  /  TBili  0.3  /  DBili  x   /  AST  17  /  ALT  11  /  AlkPhos  57  07-07       Urinalysis Basic - ( 07 Jul 2025 07:00 )    Color: x / Appearance: x / SG: x / pH: x  Gluc: 134 mg/dL / Ketone: x  / Bili: x / Urobili: x   Blood: x / Protein: x / Nitrite: x   Leuk Esterase: x / RBC: x / WBC x   Sq Epi: x / Non Sq Epi: x / Bacteria: x        RADIOLOGY & ADDITIONAL STUDIES:  Imaging:  Reviewed  < from: CT Angio Abdomen and Pelvis w/ IV Cont (07.05.25 @ 06:08) >  PROCEDURE:  CT of the Abdomen and Pelvis was performed.  Precontrast, Arterial and Delayed phases were performed.  Sagittal and coronal reformats were performed.    FINDINGS:  Evaluation is limited by motion artifact.    LOWER CHEST: Bibasilar atelectasis. Partially visualized left breast   masses largest measuring up to 4.7 cm.    LIVER: Within normal limits.  BILE DUCTS: Normal caliber.  GALLBLADDER: Within normal limits.  SPLEEN: Within normal limits.  PANCREAS: Within normal limits.  ADRENALS: Within normal limits.  KIDNEYS/URETERS: Subcentimeter right renal hypodensities, too small to   characterize. No stones or hydronephrosis.    BLADDER: Within normal limits.  REPRODUCTIVE ORGANS: Uterus and adnexa within normal limits.    BOWEL: No intraluminal contrast extravasation. No bowel obstruction.   Colonic diverticulosis. Stool throughout the colon with moderate rectal   distention. Appendix is normal.  PERITONEUM/RETROPERITONEUM: Within normal limits.  VESSELS: Atherosclerotic changes.  LYMPH NODES: No lymphadenopathy.  ABDOMINAL WALL: Within normal limits.  BONES: Degenerative changes.    IMPRESSION:  No evidence of active gastrointestinal bleeding.    Partially visualized left breast masses largest measuring up to 4.7 cm,   and increase in size.    < from: CT Head No Cont (07.06.25 @ 09:36) >  INTERPRETATION:  CLINICAL INFORMATION: Dysarthria. Right facial droop.    COMPARISON: MRI brain 9/2/2016. CT head 5/29/2015. Additional prior   studies dating back to 3/21/2014.    CONTRAST:  IV Contrast: NONE  .    TECHNIQUE:  Serial axial images were obtained from the skull base to the   vertex using multi-slice helical technique. Sagittal and coronal   reformats were obtained.    FINDINGS:    Somewhat limited evaluation due to motion.    VENTRICLES AND SULCI: Age appropriate involutional changes.  INTRA-AXIAL: No mass effect, acute hemorrhage, or midline shift.  There   are periventricular and subcortical white matter hypodensities,   consistent with moderate microvascular type changes.  EXTRA-AXIAL: No mass or fluid collection. Basal cisterns are normal in   appearance.    VISUALIZED SINUSES:  Clear.  TYMPANOMASTOID CAVITIES:  Clear.  VISUALIZED ORBITS: Normal.  CALVARIUM: Left frontal craniotomy. No acute fracture.      IMPRESSION:  No evidence of acute intracranial pathology.    Consider MRI as clinically warranted.      [facility] discussion:

## 2025-07-07 NOTE — DIETITIAN INITIAL EVALUATION ADULT - NSFNSPHYEXAMSKINFT_GEN_A_CORE
Pressure Injury 1: sacrum, Stage III  Pressure Injury 2: buttocks, Bilateral:, Stage III  Pressure Injury 3: Left:, heel, Suspected deep tissue injury  Pressure Injury 4: Right:, ankle, Unstageable  Pressure Injury 5: Right:, rectal, none  Pressure Injury 6: none, none  Pressure Injury 7: none, none  Pressure Injury 8: none, none  Pressure Injury 9: none, none  Pressure Injury 10: none, none  Pressure Injury 11: none, none, Pressure Injury 1: sacrum, Stage III  Pressure Injury 2: Bilateral:, buttocks, Stage III  Pressure Injury 3: Left:, heel, Suspected deep tissue injury  Pressure Injury 4: Right:, ankle, Unstageable  Pressure Injury 5: Right:, rectum, none  Pressure Injury 6: none, none  Pressure Injury 7: none, none  Pressure Injury 8: none, none  Pressure Injury 9: none, none  Pressure Injury 10: none, none  Pressure Injury 11: none, none

## 2025-07-07 NOTE — DIETITIAN INITIAL EVALUATION ADULT - OTHER CALCULATIONS
Spontaneous
IBW 97.5 pounds. Patient above % IBW (151%) thus ideal body weight used for all calculations. Needs adjusted for advanced age, metastatic cancer, multiple pressure ulcers. Fluids per team due to edema.

## 2025-07-07 NOTE — OCCUPATIONAL THERAPY INITIAL EVALUATION ADULT - DIAGNOSIS, OT EVAL
Patient brought to [facility] 2/2 rectal bleeding, Cantonese/English speaking,  called, presents lethargic, unable to verbalize, difficulty following single step commands 25%, RUE/RLE weakness, deficits with balance, postural control, activity tolerance impacting independence with functional activities and mobility.

## 2025-07-07 NOTE — OCCUPATIONAL THERAPY INITIAL EVALUATION ADULT - GENERAL OBSERVATIONS, REHAB EVAL
Rehab Aidleyda Razo present. Patient received semisupine in bed +IV, +room air, NAD. Patient A&Ox1, agreeable and tolerated session poorly.

## 2025-07-07 NOTE — OCCUPATIONAL THERAPY INITIAL EVALUATION ADULT - BED MOBILITY LIMITATIONS, REHAB EVAL
Patient sat at EOB for 8 mins with Mod A x 1 for dynamic sitting balance, Min A x 1 for static sitting, engaged in BUE/BLE AAROM therapeutic exercises, grooming tasks

## 2025-07-07 NOTE — PROGRESS NOTE ADULT - ASSESSMENT
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 herpetic) and found to have FTT with hyponatremia. At baseline, patient was walking 3 weeks ago and symptoms progressively worsening since last chemotherapy session. On admission, patient severely hypernatremic to 160.  Neurology consulted for worsening LE weakness and AMS. On exam, patient is severely dysarthric, slightly decreased  strength RUE compared to LUE, and decreased strength b/l LE (3/5) with drift in the RLE compared to left. Given presentation and exam, differentials include TME given severe hypernatremia and dehydration vs possible sub-acute stroke vs Elsberg syndrome or HSV-related radiculoneuropathy vs intracerebral pathology (tumor, mets..) vs medication-induced neuropathy (from methotrexate or 5-FU).    Recommendations:  - consider switching to IV Acyclovir and treat as disseminated HSV infection  - f/u MRI brain w/wo contrast and MRI L-spine w/wo contrast  - obtain CK, Myasthenia Gravis/Lambert-Eaton Myasthenic Syndrome Evaluation, Acetylcholine Modulating AB, Acetylcholine Blocking, LRP4 Autoantibody Test, Musk antibody, Paraneoplastic panel, ganglioside Ab  - obtain NIF/VC for baseline  - treat underlying TME  - rest per primary team    Case discussed with Neurology attending Dr. Albright. This is the case of a 89-year-old female Shanghainese/cantonese/limited English speaking, b/l hearing impairment (better hearing on left ear), with PMHx of Alzheimer's dementia without behavioral disturbance, AAOx1, partial ADL's, gait disturbance (RW assist, bed bound for 5 days),hx left frontal craniotomy for cerebral meningioma ( 2015), Obesity (BMI 29.7), HTN, HLD, CKD3a, hx colonic polyps, internal hemorrhoid and rectal prolapse ( 2021), hx hyponatremia, recently diagnosed stage IIIc (T3N2Mo) triple negative ( ER-/[place]-/HER2-) intraductal carcinoma( IDC) of left breast ~ 4.7cm ( 2/2025) followed by Dr.Paul Arroyo/Dr.Bonnie Keith s/p neoadjuvant chemoRx w/ CMF ( cyclophosphamide/methotrexate/5-FU started 4/15/25, s/p cycle 3 6/3) now BIBEMS for rectal bleeding likely 2/2 sacral ulcer (? herpetic) and found to have FTT with hyponatremia. At baseline, patient was walking 3 weeks ago and symptoms progressively worsening since last chemotherapy session. On admission, patient severely hypernatremic to 160.  Neurology consulted for worsening LE weakness and AMS. On exam, patient is severely dysarthric, slightly decreased  strength RUE compared to LUE, and decreased strength b/l LE (3/5) with drift in the RLE compared to left. Given presentation and exam, differentials include TME given severe hypernatremia and dehydration vs possible sub-acute stroke vs Elsberg syndrome or HSV-related myeloradiculitis vs intracerebral pathology (tumor, mets..) vs medication-induced neuropathy (from methotrexate or 5-FU).    Recommendations:  - consider switching to IV Acyclovir and treat as disseminated HSV infection  - f/u MRI brain w/wo contrast and MRI L-spine w/wo contrast  - obtain CK, Myasthenia Gravis/Lambert-Eaton Myasthenic Syndrome Evaluation, Acetylcholine Modulating AB, Acetylcholine Blocking, LRP4 Autoantibody Test, Musk antibody, Paraneoplastic panel, ganglioside Ab  - obtain NIF/VC for baseline  - treat underlying TME  - rest per primary team    Case discussed with Neurology attending Dr. Albright. This is the case of a 89-year-old female Shanghainese/cantonese/limited English speaking, b/l hearing impairment (better hearing on left ear), with PMHx of Alzheimer's dementia without behavioral disturbance, AAOx1, partial ADL's, gait disturbance (RW assist, bed bound for 5 days),hx left frontal craniotomy for cerebral meningioma ( 2015), Obesity (BMI 29.7), HTN, HLD, CKD3a, hx colonic polyps, internal hemorrhoid and rectal prolapse ( 2021), hx hyponatremia, recently diagnosed stage IIIc (T3N2Mo) triple negative ( ER-/[place]-/HER2-) intraductal carcinoma( IDC) of left breast ~ 4.7cm ( 2/2025) followed by Dr.Paul Arroyo/Dr.Bonnie Keith s/p neoadjuvant chemoRx w/ CMF ( cyclophosphamide/methotrexate/5-FU started 4/15/25, s/p cycle 3 6/3) now BIBEMS for rectal bleeding likely 2/2 sacral ulcer (? herpetic) and found to have FTT with hyponatremia. At baseline, patient was walking 3 weeks ago and symptoms progressively worsening since last chemotherapy session. On admission, patient severely hypernatremic to 160.  Neurology consulted for worsening LE weakness and AMS. On exam, patient is severely dysarthric, slightly decreased  strength RUE compared to LUE, and decreased strength b/l LE (3/5) with drift in the RLE compared to left. Given presentation and exam, differentials include TME given severe hypernatremia and dehydration vs possible sub-acute stroke vs Elsberg syndrome or HSV-related myeloradiculitis vs intracerebral pathology (tumor, mets..) vs medication-induced neuropathy (from methotrexate or 5-FU).    Recommendations:  - consider ID consult and switching to IV Acyclovir and treat as disseminated HSV infection  - f/u MRI brain w/wo contrast and MRI L-spine w/wo contrast  - treat underlying TME  - rest per primary team    Case discussed with Neurology attending Dr. Albright.

## 2025-07-07 NOTE — PROGRESS NOTE ADULT - PROBLEM SELECTOR PLAN 4
Patient has a history of CKD stage 3a and hyponatremia (possibly 2/2 SIADH per outpatient nephro note).  Sodium was 157 on admission, went up to 165 after the administration of 2L of NS.  Patient has 4.3L free water deficit    - 175ml/hr D5  - repeat BMP to trend sodium q8  - bladder scan for PVR q6h Patient has a history of CKD stage 3a and hyponatremia (possibly 2/2 SIADH per outpatient nephro note).  Sodium was 157 on admission, went up to 165 after the administration of 2L of NS.  Patient has 4.3L free water deficit  Renal consulted, given 165mL/hr D5  Na downtrending 144 >     - repeat BMP to trend sodium q8  - bladder scan for PVR q6h Patient has a history of CKD stage 3a and hyponatremia (possibly 2/2 SIADH per outpatient nephro note).  Sodium was 157 on admission, went up to 165 after the administration of 2L of NS.  Patient has 4.3L free water deficit  Renal consulted, given 165mL/hr D5  Na downtrending 144 > 142    - resolved Patient's daughter started noticing blood in the stool for the past 4 days while changing her diapers. Initially, GI bleed was suspected, but CT angio of abdomen/pelvis was normal and patient's hgb was 12.1 but on examination patient has an oozing sacral decubitus ulcer on examination. Patient has been having difficulty walking due to leg swelling and has been bed bound recently.  Bleeding is likely 2/2 sacral decubitus ulcer.  HSV 2 positive    - awaiting wound care recs  - DVT ppx resumed

## 2025-07-07 NOTE — PROGRESS NOTE ADULT - ATTENDING COMMENTS
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  On antiviral medication.  Also needs correction of metabolic derangements.  Pending MRI brain and lumbar spine with and without gadolinium.

## 2025-07-07 NOTE — PROGRESS NOTE ADULT - SUBJECTIVE AND OBJECTIVE BOX
NEUROLOGY CONSULT PROGRESS NOTE    INTERVAL HISTORY:  Patient with lesion tested positive for HSV-2. MRI brain and L-spine pending.     REVIEW OF SYSTEMS:  As per HPI, otherwise negative for Constitutional, Eyes, Ears/Nose/Mouth/Throat, Neck, Cardiovascular, Respiratory, Gastrointestinal, Genitourinary, Skin, Endocrine, Musculoskeletal, Psychiatric, and Hematologic/Lymphatic.    MEDICATIONS:  acetaminophen     Tablet .. 650 milliGRAM(s) Oral every 6 hours PRN  aluminum hydroxide/magnesium hydroxide/simethicone Suspension 30 milliLiter(s) Oral every 4 hours PRN  benzonatate 100 milliGRAM(s) Oral three times a day PRN  dextrose 5%. 1000 milliLiter(s) IV Continuous <Continuous>  dextrose 5%. 1000 milliLiter(s) IV Continuous <Continuous>  dextrose 5%. 1000 milliLiter(s) IV Continuous <Continuous>  dextrose 50% Injectable 25 Gram(s) IV Push once  dextrose 50% Injectable 12.5 Gram(s) IV Push once  dextrose 50% Injectable 25 Gram(s) IV Push once  dextrose Oral Gel 15 Gram(s) Oral once PRN  enoxaparin Injectable 40 milliGRAM(s) SubCutaneous every 24 hours  glucagon  Injectable 1 milliGRAM(s) IntraMuscular once  melatonin 3 milliGRAM(s) Oral at bedtime PRN  polyethylene glycol 3350 17 Gram(s) Oral every 12 hours  senna 2 Tablet(s) Oral at bedtime  valACYclovir 1000 milliGRAM(s) Oral two times a day    VITAL SIGNS:  Vital Signs Last 24 Hrs  T(C): 36.6 (07 Jul 2025 05:51), Max: 37 (06 Jul 2025 11:44)  T(F): 97.8 (07 Jul 2025 05:51), Max: 98.6 (06 Jul 2025 11:44)  HR: 94 (07 Jul 2025 05:51) (83 - 94)  BP: 125/64 (07 Jul 2025 05:51) (123/67 - 138/66)  BP(mean): 86 (06 Jul 2025 20:38) (86 - 90)  RR: 18 (07 Jul 2025 05:51) (18 - 18)  SpO2: 95% (07 Jul 2025 05:51) (95% - 98%)    Parameters below as of 07 Jul 2025 05:51  Patient On (Oxygen Delivery Method): room air        PHYSICAL EXAMINATION:  Mental status: Waxing and waning alertness/lethargy- hypophonic, dysarthric and nasally voice, attempts to repeat some sounds, occasionally successful otherwise nonverbal, nods yes to "do you understand" but does not always answer/answer appropriately. When sleeping, observed to be snoring.   Cranial nerves:   - Eyes: EOMI without nystagmus, Visual fields full   - Face: BL V1-V3 sensation intact symmetrically, face symmetric   - ENT: Hearing intact to voice, palate elevation symmetric, tongue was midline  Motor: 4/5 b/l shoulder, 4+/5 wrist flexion/extension, R hand  slightly weaker than left, 3/5 hip flexion/extension b/l but drift noted in RLE, 2/5 R dorsiflexion/plantarflexion on the R, 3/5 on the left, flaccid tone +paratonia. Able to raise head, but unable to properly assess NF/E  Sensation: Unable to assess   Coordination: Unable to assess.  Reflexes: 2+ in bilateral UE/LE - brisk   Gait: Deferred      LABS:                          9.1    9.93  )-----------( 278      ( 07 Jul 2025 07:00 )             31.0     07-07    142  |  112[H]  |  34[H]  ----------------------------<  134[H]  3.6   |  21[L]  |  0.87    Ca    7.6[L]      07 Jul 2025 07:00  Phos  2.9     07-07  Mg     1.8     07-07    TPro  6.6  /  Alb  1.9[L]  /  TBili  0.3  /  DBili  x   /  AST  17  /  ALT  11  /  AlkPhos  57  07-07      Urinalysis Basic - ( 07 Jul 2025 07:00 )    Color: x / Appearance: x / SG: x / pH: x  Gluc: 134 mg/dL / Ketone: x  / Bili: x / Urobili: x   Blood: x / Protein: x / Nitrite: x   Leuk Esterase: x / RBC: x / WBC x   Sq Epi: x / Non Sq Epi: x / Bacteria: x        RADIOLOGY & ADDITIONAL STUDIES:      IMPRESSION & RECOMMENDATIONS:

## 2025-07-07 NOTE — CONSULT NOTE ADULT - PROBLEM SELECTOR RECOMMENDATION 3
.  alb 2.6-2.7  - Malnutrition secondary to malignancy is contributing to patient's progressive functional decline, increasing vulnerability to infections, and impairing wound healing, ultimately impacting prognosis .  - Common complication in immobile patients, highlighting decreased mobility and increased care needs.  - Ulcer(s) pose risks of infection, pain, and further functional decline, compounding the challenges of advanced disease and often reflects an overall deterioration in health and a trajectory towards end-of-life.

## 2025-07-07 NOTE — DIETITIAN INITIAL EVALUATION ADULT - PROBLEM SELECTOR PLAN 1
Patient's daughter started noticing blood in the stool for the past 4 days while changing her diapers. Initially, GI bleed was suspected, but CT angio of abdomen/pelvis was normal and patient's hgb was 12.1 but on examination patient has an oozing sacral decubitus ulcer on examination. Patient has been having difficulty walking due to leg swelling and has been bed bound recently.  Bleeding is likely 2/2 sacral decubitus ulcer.    - wound care consulted  - HSV swab  - start DVT prophylaxis in the AM if no GI bleeding is confirmed

## 2025-07-07 NOTE — CONSULT NOTE ADULT - CONSULT REASON
Palliative consulted to discuss goals of care and treatment preferences in the setting of advanced illness

## 2025-07-08 PROBLEM — C50.919 MALIGNANT NEOPLASM OF UNSPECIFIED SITE OF UNSPECIFIED FEMALE BREAST: Chronic | Status: ACTIVE | Noted: 2025-07-05

## 2025-07-08 LAB
ALBUMIN SERPL ELPH-MCNC: 2.2 G/DL — LOW (ref 3.3–5)
ALP SERPL-CCNC: 57 U/L — SIGNIFICANT CHANGE UP (ref 40–120)
ALT FLD-CCNC: 13 U/L — SIGNIFICANT CHANGE UP (ref 10–45)
ANION GAP SERPL CALC-SCNC: 9 MMOL/L — SIGNIFICANT CHANGE UP (ref 5–17)
AST SERPL-CCNC: 19 U/L — SIGNIFICANT CHANGE UP (ref 10–40)
BASOPHILS # BLD AUTO: 0.03 K/UL — SIGNIFICANT CHANGE UP (ref 0–0.2)
BASOPHILS NFR BLD AUTO: 0.4 % — SIGNIFICANT CHANGE UP (ref 0–2)
BILIRUB SERPL-MCNC: 0.4 MG/DL — SIGNIFICANT CHANGE UP (ref 0.2–1.2)
BUN SERPL-MCNC: 22 MG/DL — SIGNIFICANT CHANGE UP (ref 7–23)
CALCIUM SERPL-MCNC: 7.8 MG/DL — LOW (ref 8.4–10.5)
CHLORIDE SERPL-SCNC: 114 MMOL/L — HIGH (ref 96–108)
CO2 SERPL-SCNC: 19 MMOL/L — LOW (ref 22–31)
CREAT SERPL-MCNC: 0.84 MG/DL — SIGNIFICANT CHANGE UP (ref 0.5–1.3)
EGFR: 66 ML/MIN/1.73M2 — SIGNIFICANT CHANGE UP
EGFR: 66 ML/MIN/1.73M2 — SIGNIFICANT CHANGE UP
EOSINOPHIL # BLD AUTO: 0.48 K/UL — SIGNIFICANT CHANGE UP (ref 0–0.5)
EOSINOPHIL NFR BLD AUTO: 6.6 % — HIGH (ref 0–6)
GD1A GANGL IGG+IGM SER IA-ACNC: 8 IV — SIGNIFICANT CHANGE UP (ref 0–50)
GD1B GANGL IGG+IGM SER IA-ACNC: 6 IV — SIGNIFICANT CHANGE UP (ref 0–50)
GLUCOSE SERPL-MCNC: 98 MG/DL — SIGNIFICANT CHANGE UP (ref 70–99)
GM1 ASIALO IGG+IGM SER IA-ACNC: 9 IV — SIGNIFICANT CHANGE UP (ref 0–50)
GM1 GANGL IGG+IGM SER-ACNC: 6 IV — SIGNIFICANT CHANGE UP (ref 0–50)
GM2 GANGL IGG+IGM SER IA-ACNC: 7 IV — SIGNIFICANT CHANGE UP (ref 0–50)
GQ1B GANGL IGG+IGM SER IA-ACNC: 7 IV — SIGNIFICANT CHANGE UP (ref 0–50)
HCT VFR BLD CALC: 30.7 % — LOW (ref 34.5–45)
HGB BLD-MCNC: 9.3 G/DL — LOW (ref 11.5–15.5)
IMM GRANULOCYTES # BLD AUTO: 0.03 K/UL — SIGNIFICANT CHANGE UP (ref 0–0.07)
IMM GRANULOCYTES NFR BLD AUTO: 0.4 % — SIGNIFICANT CHANGE UP (ref 0–0.9)
LYMPHOCYTES # BLD AUTO: 0.76 K/UL — LOW (ref 1–3.3)
LYMPHOCYTES NFR BLD AUTO: 10.5 % — LOW (ref 13–44)
MAGNESIUM SERPL-MCNC: 1.9 MG/DL — SIGNIFICANT CHANGE UP (ref 1.6–2.6)
MCHC RBC-ENTMCNC: 28.2 PG — SIGNIFICANT CHANGE UP (ref 27–34)
MCHC RBC-ENTMCNC: 30.3 G/DL — LOW (ref 32–36)
MCV RBC AUTO: 93 FL — SIGNIFICANT CHANGE UP (ref 80–100)
MONOCYTES # BLD AUTO: 0.49 K/UL — SIGNIFICANT CHANGE UP (ref 0–0.9)
MONOCYTES NFR BLD AUTO: 6.8 % — SIGNIFICANT CHANGE UP (ref 2–14)
NEUTROPHILS # BLD AUTO: 5.43 K/UL — SIGNIFICANT CHANGE UP (ref 1.8–7.4)
NEUTROPHILS NFR BLD AUTO: 75.3 % — SIGNIFICANT CHANGE UP (ref 43–77)
NRBC # BLD AUTO: 0 K/UL — SIGNIFICANT CHANGE UP (ref 0–0)
NRBC # FLD: 0 K/UL — SIGNIFICANT CHANGE UP (ref 0–0)
NRBC BLD AUTO-RTO: 0 /100 WBCS — SIGNIFICANT CHANGE UP (ref 0–0)
PHOSPHATE SERPL-MCNC: 2.2 MG/DL — LOW (ref 2.5–4.5)
PLATELET # BLD AUTO: 279 K/UL — SIGNIFICANT CHANGE UP (ref 150–400)
PMV BLD: 11.5 FL — SIGNIFICANT CHANGE UP (ref 7–13)
POTASSIUM SERPL-MCNC: 3.7 MMOL/L — SIGNIFICANT CHANGE UP (ref 3.5–5.3)
POTASSIUM SERPL-SCNC: 3.7 MMOL/L — SIGNIFICANT CHANGE UP (ref 3.5–5.3)
PROT SERPL-MCNC: 6.7 G/DL — SIGNIFICANT CHANGE UP (ref 6–8.3)
RBC # BLD: 3.3 M/UL — LOW (ref 3.8–5.2)
RBC # FLD: 19.2 % — HIGH (ref 10.3–14.5)
SODIUM SERPL-SCNC: 142 MMOL/L — SIGNIFICANT CHANGE UP (ref 135–145)
WBC # BLD: 7.22 K/UL — SIGNIFICANT CHANGE UP (ref 3.8–10.5)
WBC # FLD AUTO: 7.22 K/UL — SIGNIFICANT CHANGE UP (ref 3.8–10.5)

## 2025-07-08 PROCEDURE — G0545: CPT

## 2025-07-08 PROCEDURE — 99232 SBSQ HOSP IP/OBS MODERATE 35: CPT | Mod: GC

## 2025-07-08 PROCEDURE — 74018 RADEX ABDOMEN 1 VIEW: CPT | Mod: 26

## 2025-07-08 PROCEDURE — 99232 SBSQ HOSP IP/OBS MODERATE 35: CPT

## 2025-07-08 PROCEDURE — 99233 SBSQ HOSP IP/OBS HIGH 50: CPT

## 2025-07-08 PROCEDURE — 99233 SBSQ HOSP IP/OBS HIGH 50: CPT | Mod: GC

## 2025-07-08 RX ORDER — SODIUM CHLORIDE 9 G/1000ML
250 INJECTION, SOLUTION INTRAVENOUS ONCE
Refills: 0 | Status: DISCONTINUED | OUTPATIENT
Start: 2025-07-08 | End: 2025-07-08

## 2025-07-08 RX ORDER — SOD PHOS DI, MONO/K PHOS MONO 250 MG
2 TABLET ORAL ONCE
Refills: 0 | Status: COMPLETED | OUTPATIENT
Start: 2025-07-08 | End: 2025-07-08

## 2025-07-08 RX ORDER — MAGNESIUM SULFATE 500 MG/ML
2 SYRINGE (ML) INJECTION ONCE
Refills: 0 | Status: COMPLETED | OUTPATIENT
Start: 2025-07-08 | End: 2025-07-08

## 2025-07-08 RX ADMIN — Medication 113 MILLIGRAM(S): at 06:51

## 2025-07-08 RX ADMIN — POLYETHYLENE GLYCOL 3350 17 GRAM(S): 17 POWDER, FOR SOLUTION ORAL at 06:52

## 2025-07-08 RX ADMIN — Medication 25 GRAM(S): at 10:53

## 2025-07-08 RX ADMIN — Medication 2 TABLET(S): at 21:05

## 2025-07-08 RX ADMIN — ENOXAPARIN SODIUM 40 MILLIGRAM(S): 100 INJECTION SUBCUTANEOUS at 17:38

## 2025-07-08 RX ADMIN — POLYETHYLENE GLYCOL 3350 17 GRAM(S): 17 POWDER, FOR SOLUTION ORAL at 17:39

## 2025-07-08 RX ADMIN — Medication 2 PACKET(S): at 13:56

## 2025-07-08 RX ADMIN — Medication 1000 MILLIGRAM(S): at 17:39

## 2025-07-08 RX ADMIN — Medication 40 MILLIEQUIVALENT(S): at 10:53

## 2025-07-08 RX ADMIN — Medication 1 TABLET(S): at 09:16

## 2025-07-08 RX ADMIN — Medication 2 TABLET(S): at 00:10

## 2025-07-08 NOTE — PROGRESS NOTE ADULT - ASSESSMENT
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 herpetic) and found to have FTT with hyponatremia. At baseline, patient was walking 3 weeks ago and symptoms progressively worsening since last chemotherapy session. On admission, patient severely hypernatremic to 160.  Neurology consulted for worsening LE weakness and AMS. On exam, patient is severely dysarthric, slightly decreased  strength RUE compared to LUE, and decreased strength b/l LE (3/5) with drift in the RLE compared to left. Given presentation and exam, differentials include TME given severe hypernatremia and dehydration vs possible sub-acute stroke vs Elsberg syndrome or HSV-related myeloradiculitis vs intracerebral pathology (tumor, mets..) vs medication-induced neuropathy (from methotrexate or 5-FU).  Imaging reviewed, MRI brain negative for acute stroke, MRI spine with tarlov cyst, which could potentially explain the LE symptoms that patient has. Today, patient is more alert, oriented, following simple commands, and able to name and repeat simple sentences, looks like presentation was related to toxic metabolic etiology.     Recommendations:  - consider neurosurgery/spine evaluation of the tarlov cyst finding on MRI spine  - f/u ID recs for HSV tx  - no other acute neuro intervention inpatient  - neurology to sign off    Case discussed with Neurology attending Dr. Albright.   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 herpetic) and found to have FTT with hyponatremia. At baseline, patient was walking 3 weeks ago and symptoms progressively worsening since last chemotherapy session. On admission, patient severely hypernatremic to 160.  Neurology consulted for worsening LE weakness and AMS. On exam, patient is severely dysarthric, slightly decreased  strength RUE compared to LUE, and decreased strength b/l LE (3/5) with drift in the RLE compared to left. Given presentation and exam, differentials include TME given severe hypernatremia and dehydration vs possible sub-acute stroke vs Elsberg syndrome or HSV-related myeloradiculitis vs intracerebral pathology (tumor, mets..) vs medication-induced neuropathy (from methotrexate or 5-FU).  Imaging reviewed, MRI brain negative for acute stroke, MRI spine with tarlov cyst, which could potentially explain the LE symptoms that patient has. Today, patient is more alert, oriented, following simple commands, and able to name and repeat simple sentences, looks like presentation was related to toxic metabolic etiology.     Recommendations:  - f/u ID recs for HSV tx  - no other acute neuro intervention inpatient  - rest of care per primary team    Case discussed with Neurology attending Dr. Albright.

## 2025-07-08 NOTE — PROGRESS NOTE ADULT - ATTENDING COMMENTS
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 herpetic looking) and found to have ME/FTT with hyponatremia, RAFFI on CKD3a and functional quadriplegia Vs gait abnormality /bed bound for 5 days.   AAOx1 conversant in Cantonese, denies pain, urinary symptoms, denies saddle anesthesia, BMx 2 yesterday.  NAD Heart RRR normal S1 S2,Lungs clear Abd soft ND/NT, Ext: no edema, chronic skin changes, Rectal: rectal fissure laceration and a 2-3 rectal ulcers~ 1cm, neuro +3/5 RLE/RLE, no pronation drift  Labs/imaging reviewed     # rectal bleed likely 2/2 sacral ulcer ( HSV detected)   # ruled Elsberg syndrome ( HSV myeloradiculitis)   - MRI lumber spine showed no enhancement of thecal sac, pt is clinically better from ME/corrected hypernatremia and RAFFI  - ID consult appreciated d/w Dr. Dorsey, agreed to d/c acyclovir and resume valtrex 1g PO bid for total 7 days for HSV sacral ulcer   -  Valtrex 1g PO bid for 7 days ( 7/7-7/13)   - Neuro f/u appreicated as d/w   - local wound care w/ foam dressing    # Hx Hyponatremia/SIADH  # Hypernatremia ( corrected)   # RAFFI corrected ( likely 2/2 prerenal azotemia Vs ischemic ATN) on CKD3a  # acute urinary retention likely 2/2 constipation( moderate stool burden seen on CT)   - Renal f/u appreciated  - SNa baseline 133( 4/15/25)-> 157 ( ED 7/5)-> 165 peaked -> 157 -> 152-> 142(7/7)  - SCr baseline 0.7( 4/15/25)-> 1.61 (ED)-> 1.61 peaked -> 1.21-> 1.1-> 0.87( 7/7)  - pinzon cath placement ( 7/7) , had BM. will d/c pinzon (7/8) for TOV, f/u bladder scan q6hr for PVR please  - AXR( 7/8): moderate stool burden  - bowel regimen for constipation; c/w Senna 2 tabs qHS and miralax 17g PO BID , monitor BM     # FTT likely related to chemoRx   # Protein Calorie malnutrition (albumin 2.6-2.7)   - Nutrition consult   - Ensure max protein (Vanilla) bid     # Deconditioning   # Functional quadriplegia   # gait abnormality to rule out Brain mets Vs stroke Vs medical induced neuropathy , doubt Elsberg syndrome   - Neuro consult appreciated   - CTH negative( 7/6)  - MRI brain w/wo contrast and MRI lumber spine w/wo contrast ( 7/7) negative brain/spine mets, no enhancement of thecal sac  - CK 77  - low clinical suspicion for paraneoplastic syndrome, f/u Myasthenia Gravis/Lambert-Eaton Myasthenic Syndrome Evaluation, Acetylcholine Modulating AB, Acetylcholine Blocking, LRP4 Autoantibody Test, Musk antibody, Paraneoplastic panel, ganglioside Ab  - Fall precautions   - PT eval reviewed, required 2 people assistance and unable to full stand as d/w daughter, Yoli whom now agreed to JEREMY placement     # Stage IIIc/T3N2/Mo triple negative IDC L breast ( TN[facility])  - undergoing neoadjuvant chemo; CMF s/p 3 cycles ( 4/15, 5/13 and 6/3)    - Breast surgeon Dr. Mauri arroyo, Med Onc Dr. Poly Keith ( informed via emails)   - Palliative care consult appreciated for GO     # Alzheimer's dementia wo behavioral disturbance  # ? ME   - AAOx1  - SLP eval appreciated rec: puree w/ thin   - delirium precaution     # DVT ppx:  Lovenox     # Advance directives: DNR/DNI (MOLST form filled out) GOC as d/w daughter/Yoli, no heroic measures i.e. CPR or intubation in an event required resuscitation. Informed Dr. Arroyo and Dr. Keith about admission     Contacts:  legal guardian( daughter): Bozena (Yoli) Tomasa [phone]  PMD Dr.Daniel Schumacher  Breast Surgeon: Dr.Paul Arroyo   Med Onc Dr. Poly Keith   Rad Onc Dr. Jesse Villeda     # Disposition: medically ready, plan for JEREMY placement      POC as d/w caregiver, 7UR2 team Dr.Grant Goode and Dr. Mo Geronimo     Time-based billing (NON-critical care).     51 minutes spent on total encounter. The necessity of the time spent during the encounter on this date of service was due to:     Time spent on this encounter including   reviewed HIE for collaterals, chart, vitals,labs, imaging   interpretation and documentation  discussion with caregiver and housestaff   excluded teaching time, separately billed services.

## 2025-07-08 NOTE — PROGRESS NOTE ADULT - PROBLEM SELECTOR PLAN 1
new funct quad in days prior to admission. pps 40%.   - mgmt per primary team, MRI w/o brain mets. 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

## 2025-07-08 NOTE — PROGRESS NOTE ADULT - ASSESSMENT
89y F with pmh Alzheimer's dementia, metastatic breast cancer, hypertension, stage 3a chronic kidney disease, hyperlipidemia, cerebral meningioma, hyponatremia, and vitamin D deficiency brought in by EMS for rectal bleeding. CTAP showed no GI bleeding. 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 Also with new weakness, CTH showed no acute intracranial pathology. Course complicated by hypernatremia to 163 which resolved with D5 now at 142.    89y F with pmh Alzheimer's dementia, metastatic breast cancer, hypertension, stage 3a chronic kidney disease, hyperlipidemia, cerebral meningioma, hyponatremia, and vitamin D deficiency brought in by EMS for rectal bleeding. CTAP showed no GI bleeding. Was found to have bleeding sacral ulcer that was positive for HSV2 will treat with valtrexr. Also with new weakness, CTH showed no acute intracranial pathology. Course complicated by hypernatremia to 163 which resolved with D5 now at 142. MRI brain showing no acute intracranial pathology, and no brain metastasis. MRI lumbar showed L4 - L5 stenosis with no metastasis,    89y F with pmh Alzheimer's dementia, metastatic breast cancer, hypertension, stage 3a chronic kidney disease, hyperlipidemia, cerebral meningioma, hyponatremia, and vitamin D deficiency brought in by EMS for rectal bleeding. CTAP showed no GI bleeding. 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 Also with new weakness, CTH showed no acute intracranial pathology. Course complicated by hypernatremia to 163 which resolved with D5 now at 142. MRI brain showing no acute intracranial pathology, and no brain metastasis. MRI lumbar showed L4 - L5 stenosis with no metastasis,

## 2025-07-08 NOTE — PROGRESS NOTE ADULT - ASSESSMENT
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 Neurology is currently following and is concerned for myeloradiculitis (Elsberg Syndrome)  which could be caused by HSV given patient's presentation of the ulcer and new onset BLE weakness. Patient has remained afebrile during admission with resolved leukocytosis (11.98 -> 9.93), UCx 3 organisms (likely contaminant), and BCx NGTD @ 48. On exam, difficult to assess neurologically due to patient's baseline mental status and waxing/waning cooperation. Lungs ctab, abd soft ntnd, 1+ pitting edema BLE, no nuchal rigidity or pain on palpation. Unclear if patient having urinary or bowel incontinence, she uses diapers at home and daughter reports patient does alert when urinating/defecating. Due to concern for myeloradiculitis, will start antivirals while pending further imaging and monitor patient's clinical status for improvement. Can consider LP in the future if within GOC, palliative is following and daughter would like to further discuss patient's prognosis with oncology.    - start acyclovir 700mg q12h (renally dosed)   - will need to pre-hydrate with 250cc NS before each dose   - f/u MRI head & L-spine   - consider LP if within GOC (complete imaging first)     ID team 1 will follow.   Case discussed with Dr. Dorsey.  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 Neurology is currently following and is concerned for myeloradiculitis (Elsberg Syndrome)  which could be caused by HSV given patient's presentation of the ulcer and new onset BLE weakness. Patient has remained afebrile during admission with resolved leukocytosis (11.98 -> 9.93), UCx 3 organisms (likely contaminant), and BCx NGTD @ 48. On exam, difficult to assess neurologically due to patient's baseline mental status and waxing/waning cooperation. Lungs ctab, abd soft ntnd, 1+ pitting edema BLE, no nuchal rigidity or pain on palpation. Unclear if patient having urinary or bowel incontinence, she uses diapers at home and daughter reports patient does alert when urinating/defecating. Due to concern for myeloradiculitis, will start antivirals while pending further imaging and monitor patient's clinical status for improvement. Can consider LP in the future if within GOC, palliative is following and daughter would like to further discuss patient's prognosis with oncology.    - Agree w/ dc'ing acyclovir.  - Start Valrex 1g BID x 10d, followed by suppression tx of valtrex 500mg BID indefinitely.   - Weekly labs: CMP, CBC, ESR, CRP faxed to ID office at [phone]  - Patient to follow up with Dr. Dorsey in 2 weeks ([address] [place] [address], [phone]), ID office will call patient to schedule     ID team 1 will sign off at this time.  Case discussed with Dr. Dorsey.  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 Neurology is currently following and is concerned for myeloradiculitis (Elsberg Syndrome)  which could be caused by HSV given patient's presentation of the ulcer and new onset BLE weakness. Patient has remained afebrile during admission with resolved leukocytosis (11.98 -> 9.93), UCx 3 organisms (likely contaminant), and BCx NGTD @ 48. On exam, difficult to assess neurologically due to patient's baseline mental status and waxing/waning cooperation. Lungs ctab, abd soft ntnd, 1+ pitting edema BLE, no nuchal rigidity or pain on palpation. Unclear if patient having urinary or bowel incontinence, she uses diapers at home and daughter reports patient does alert when urinating/defecating. Due to concern for myeloradiculitis, will start antivirals while pending further imaging and monitor patient's clinical status for improvement. Can consider LP in the future if within GOC, palliative is following and daughter would like to further discuss patient's prognosis with oncology.    - Agree w/ dc'ing acyclovir.  - Start Valtrex 1g BID x 10d, followed by suppression tx of valtrex 500mg BID indefinitely.     ID team 1 will sign off at this time.  Case discussed with Dr. Dorsey.

## 2025-07-08 NOTE — PROGRESS NOTE ADULT - PROBLEM SELECTOR PLAN 11
Patient has a history of left frontal meningioma that was resected in 2015    Patient has a history of hypertension. She was prescribed valsartan 40mg per surescripts but hasn't been taking it for weeks per daughter.  Blood pressure on admission was 90/61 went up to 137/58 after fluid bolus in the ED.    - hold home valsartan    HLD  Patient has a history of hyperlipidemia. She was prescribed atorvastatin 10mg per surescripts  but hasn't been taking it for weeks per daughter.    - hold home meds

## 2025-07-08 NOTE — PROGRESS NOTE ADULT - PROBLEM SELECTOR PLAN 5
sp neoadjuvant chemo; CMF s/p 3 cycles 4/15, 5/13 and 6/3 with functional decline, deconditioning, and declining nutritional status   - follows with Med Onc Dr. Poly Keith   - If no further disease modifying treatments offered or patient/family declined further disease modifying treatments,  patient would qualify for hospice

## 2025-07-08 NOTE — PROGRESS NOTE ADULT - PROBLEM SELECTOR PLAN 6
Patient's daughter was told that the patient has CKD but patient never experienced any problems with urination and has appropriate urine output.  On admission: Cr 1.59, , eGFR 30  Now: Cr .87 BUN 34, eGFR 63    - RAFFI resolved

## 2025-07-08 NOTE — PROGRESS NOTE ADULT - SUBJECTIVE AND OBJECTIVE BOX
INFECTIOUS DISEASES CONSULT FOLLOW-UP NOTE    INTERVAL HPI/OVERNIGHT EVENTS:      ROS:   Constitutional, eyes, ENT, cardiovascular, respiratory, gastrointestinal, genitourinary, integumentary, neurological, psychiatric and heme/lymph are otherwise negative other than noted above       ANTIBIOTICS/RELEVANT:    MEDICATIONS  (STANDING):  acyclovir IVPB 650 milliGRAM(s) IV Intermittent every 12 hours  dextrose 5%. 1000 milliLiter(s) (100 mL/Hr) IV Continuous <Continuous>  dextrose 5%. 1000 milliLiter(s) (50 mL/Hr) IV Continuous <Continuous>  dextrose 50% Injectable 25 Gram(s) IV Push once  dextrose 50% Injectable 12.5 Gram(s) IV Push once  dextrose 50% Injectable 25 Gram(s) IV Push once  enoxaparin Injectable 40 milliGRAM(s) SubCutaneous every 24 hours  glucagon  Injectable 1 milliGRAM(s) IntraMuscular once  multivitamin 1 Tablet(s) Oral daily  polyethylene glycol 3350 17 Gram(s) Oral every 12 hours  senna 2 Tablet(s) Oral at bedtime    MEDICATIONS  (PRN):  acetaminophen     Tablet .. 650 milliGRAM(s) Oral every 6 hours PRN Temp greater or equal to 38C (100.4F), Mild Pain (1 - 3)  aluminum hydroxide/magnesium hydroxide/simethicone Suspension 30 milliLiter(s) Oral every 4 hours PRN Dyspepsia  benzonatate 100 milliGRAM(s) Oral three times a day PRN Cough  dextrose Oral Gel 15 Gram(s) Oral once PRN Blood Glucose LESS THAN 70 milliGRAM(s)/deciliter  melatonin 3 milliGRAM(s) Oral at bedtime PRN Insomnia        Vital Signs Last 24 Hrs  T(C): 37.6 (08 Jul 2025 05:54), Max: 37.6 (08 Jul 2025 05:54)  T(F): 99.7 (08 Jul 2025 05:54), Max: 99.7 (08 Jul 2025 05:54)  HR: 100 (08 Jul 2025 05:54) (81 - 100)  BP: 131/67 (08 Jul 2025 05:54) (108/62 - 131/67)  BP(mean): 88 (08 Jul 2025 05:54) (78 - 88)  RR: 18 (08 Jul 2025 05:54) (18 - 19)  SpO2: 97% (08 Jul 2025 05:54) (96% - 97%)    Parameters below as of 08 Jul 2025 05:54  Patient On (Oxygen Delivery Method): room air        07-07-25 @ 07:01  -  07-08-25 @ 07:00  --------------------------------------------------------  IN: 0 mL / OUT: 1450 mL / NET: -1450 mL      PHYSICAL EXAM:  Constitutional: alert, NAD  Eyes: the sclera and conjunctiva were normal.   ENT: the ears and nose were normal in appearance.   Neck: the appearance of the neck was normal and the neck was supple.   Pulmonary: no respiratory distress and lungs were clear to auscultation bilaterally.   Heart: heart rate was normal and rhythm regular, normal S1 and S2  Vascular:. there was no peripheral edema  Abdomen: normal bowel sounds, soft, non-tender  Neurological: no focal deficits.   Psychiatric: the affect was normal        LABS:                        9.3    7.22  )-----------( 279      ( 08 Jul 2025 07:00 )             30.7     07-08    142  |  114[H]  |  22  ----------------------------<  98  3.7   |  19[L]  |  0.84    Ca    7.8[L]      08 Jul 2025 07:00  Phos  2.2     07-08  Mg     1.9     07-08    TPro  6.7  /  Alb  2.2[L]  /  TBili  0.4  /  DBili  x   /  AST  19  /  ALT  13  /  AlkPhos  57  07-08      Urinalysis Basic - ( 08 Jul 2025 07:00 )    Color: x / Appearance: x / SG: x / pH: x  Gluc: 98 mg/dL / Ketone: x  / Bili: x / Urobili: x   Blood: x / Protein: x / Nitrite: x   Leuk Esterase: x / RBC: x / WBC x   Sq Epi: x / Non Sq Epi: x / Bacteria: x        MICROBIOLOGY:      RADIOLOGY & ADDITIONAL STUDIES:  Reviewed INFECTIOUS DISEASES CONSULT FOLLOW-UP NOTE    INTERVAL HPI/OVERNIGHT EVENTS:  No acute event overnight. Pt able to understand few question. Nods when asked if she felt well. Shook head when asked if she had any pain, fever, chills.      ROS:   Constitutional, eyes, ENT, cardiovascular, respiratory, gastrointestinal, genitourinary, integumentary, neurological, psychiatric and heme/lymph are otherwise negative other than noted above       ANTIBIOTICS/RELEVANT:    MEDICATIONS  (STANDING):  acyclovir IVPB 650 milliGRAM(s) IV Intermittent every 12 hours  dextrose 5%. 1000 milliLiter(s) (100 mL/Hr) IV Continuous <Continuous>  dextrose 5%. 1000 milliLiter(s) (50 mL/Hr) IV Continuous <Continuous>  dextrose 50% Injectable 25 Gram(s) IV Push once  dextrose 50% Injectable 12.5 Gram(s) IV Push once  dextrose 50% Injectable 25 Gram(s) IV Push once  enoxaparin Injectable 40 milliGRAM(s) SubCutaneous every 24 hours  glucagon  Injectable 1 milliGRAM(s) IntraMuscular once  multivitamin 1 Tablet(s) Oral daily  polyethylene glycol 3350 17 Gram(s) Oral every 12 hours  senna 2 Tablet(s) Oral at bedtime    MEDICATIONS  (PRN):  acetaminophen     Tablet .. 650 milliGRAM(s) Oral every 6 hours PRN Temp greater or equal to 38C (100.4F), Mild Pain (1 - 3)  aluminum hydroxide/magnesium hydroxide/simethicone Suspension 30 milliLiter(s) Oral every 4 hours PRN Dyspepsia  benzonatate 100 milliGRAM(s) Oral three times a day PRN Cough  dextrose Oral Gel 15 Gram(s) Oral once PRN Blood Glucose LESS THAN 70 milliGRAM(s)/deciliter  melatonin 3 milliGRAM(s) Oral at bedtime PRN Insomnia        Vital Signs Last 24 Hrs  T(C): 37.6 (08 Jul 2025 05:54), Max: 37.6 (08 Jul 2025 05:54)  T(F): 99.7 (08 Jul 2025 05:54), Max: 99.7 (08 Jul 2025 05:54)  HR: 100 (08 Jul 2025 05:54) (81 - 100)  BP: 131/67 (08 Jul 2025 05:54) (108/62 - 131/67)  BP(mean): 88 (08 Jul 2025 05:54) (78 - 88)  RR: 18 (08 Jul 2025 05:54) (18 - 19)  SpO2: 97% (08 Jul 2025 05:54) (96% - 97%)    Parameters below as of 08 Jul 2025 05:54  Patient On (Oxygen Delivery Method): room air        07-07-25 @ 07:01  -  07-08-25 @ 07:00  --------------------------------------------------------  IN: 0 mL / OUT: 1450 mL / NET: -1450 mL      PHYSICAL EXAM:  Constitutional: alert, NAD  Eyes: the sclera and conjunctiva were normal.   ENT: the ears and nose were normal in appearance.   Neck: the appearance of the neck was normal and the neck was supple.   Pulmonary: no respiratory distress and lungs were clear to auscultation bilaterally.   Heart: heart rate was normal and rhythm regular, normal S1 and S2  Vascular:. there was no peripheral edema  Abdomen: normal bowel sounds, soft, non-tender  Neurological: no focal deficits.   Psychiatric: the affect was normal        LABS:                        9.3    7.22  )-----------( 279      ( 08 Jul 2025 07:00 )             30.7     07-08    142  |  114[H]  |  22  ----------------------------<  98  3.7   |  19[L]  |  0.84    Ca    7.8[L]      08 Jul 2025 07:00  Phos  2.2     07-08  Mg     1.9     07-08    TPro  6.7  /  Alb  2.2[L]  /  TBili  0.4  /  DBili  x   /  AST  19  /  ALT  13  /  AlkPhos  57  07-08      Urinalysis Basic - ( 08 Jul 2025 07:00 )    Color: x / Appearance: x / SG: x / pH: x  Gluc: 98 mg/dL / Ketone: x  / Bili: x / Urobili: x   Blood: x / Protein: x / Nitrite: x   Leuk Esterase: x / RBC: x / WBC x   Sq Epi: x / Non Sq Epi: x / Bacteria: x        MICROBIOLOGY:      RADIOLOGY & ADDITIONAL STUDIES:  Reviewed

## 2025-07-08 NOTE — PROGRESS NOTE ADULT - SUBJECTIVE AND OBJECTIVE BOX
INTERVAL/OVERNIGHT EVENTS: None    SUBJECTIVE:  Patient seen and examined at bedside, comfortable and sleeping. AOx1, baseline for pt. following commands, no focal deficits.     Vital Signs Last 12 Hrs  T(F): 98.6 (07-06-25 @ 11:44), Max: 98.6 (07-06-25 @ 11:44)  HR: 92 (07-06-25 @ 11:44) (85 - 92)  BP: 138/66 (07-06-25 @ 11:44) (132/50 - 138/66)  BP(mean): 90 (07-06-25 @ 11:44) (78 - 90)  RR: 18 (07-06-25 @ 11:44) (18 - 18)  SpO2: 98% (07-06-25 @ 11:44) (95% - 98%)  I&O's Summary    05 Jul 2025 07:01  -  06 Jul 2025 07:00  --------------------------------------------------------  IN: 0 mL / OUT: 1350 mL / NET: -1350 mL    06 Jul 2025 07:01  -  06 Jul 2025 14:01  --------------------------------------------------------  IN: 0 mL / OUT: 400 mL / NET: -400 mL        PHYSICAL EXAM:  General: NAD  HEENT: PERRL, sclera anicteric, MMM  Cardiovascular: RRR; no MRG;   Respiratory: CTA b/l; no w/r/r  GI/: soft; NTND; BS x4  Extremities: WWP; 2+ peripheral pulses bilaterally; no LE edema  Skin: normal color & turgor; no rash  Neurologic: aox1; no focal deficits    LABS:                        9.8    11.98 )-----------( 294      ( 06 Jul 2025 06:45 )             34.9     07-06    152[H]  |  123[H]  |  82[H]  ----------------------------<  160[H]  3.8   |  21[L]  |  1.10    Ca    8.0[L]      06 Jul 2025 06:45  Phos  2.6     07-06  Mg     2.3     07-06    TPro  6.9  /  Alb  2.3[L]  /  TBili  0.4  /  DBili  x   /  AST  20  /  ALT  12  /  AlkPhos  53  07-06    PT/INR - ( 05 Jul 2025 04:38 )   PT: 13.0 sec;   INR: 1.11          PTT - ( 05 Jul 2025 04:38 )  PTT:35.7 sec  Urinalysis Basic - ( 06 Jul 2025 06:45 )    Color: x / Appearance: x / SG: x / pH: x  Gluc: 160 mg/dL / Ketone: x  / Bili: x / Urobili: x   Blood: x / Protein: x / Nitrite: x   Leuk Esterase: x / RBC: x / WBC x   Sq Epi: x / Non Sq Epi: x / Bacteria: x          RADIOLOGY & ADDITIONAL TESTS:    MEDICATIONS  (STANDING):  dextrose 5%. 1000 milliLiter(s) (175 mL/Hr) IV Continuous <Continuous>  dextrose 5%. 1000 milliLiter(s) (100 mL/Hr) IV Continuous <Continuous>  dextrose 5%. 1000 milliLiter(s) (50 mL/Hr) IV Continuous <Continuous>  dextrose 50% Injectable 25 Gram(s) IV Push once  dextrose 50% Injectable 12.5 Gram(s) IV Push once  dextrose 50% Injectable 25 Gram(s) IV Push once  enoxaparin Injectable 40 milliGRAM(s) SubCutaneous every 24 hours  glucagon  Injectable 1 milliGRAM(s) IntraMuscular once  polyethylene glycol 3350 17 Gram(s) Oral every 12 hours  senna 2 Tablet(s) Oral at bedtime    MEDICATIONS  (PRN):  acetaminophen     Tablet .. 650 milliGRAM(s) Oral every 6 hours PRN Temp greater or equal to 38C (100.4F), Mild Pain (1 - 3)  aluminum hydroxide/magnesium hydroxide/simethicone Suspension 30 milliLiter(s) Oral every 4 hours PRN Dyspepsia  benzonatate 100 milliGRAM(s) Oral three times a day PRN Cough  dextrose Oral Gel 15 Gram(s) Oral once PRN Blood Glucose LESS THAN 70 milliGRAM(s)/deciliter  melatonin 3 milliGRAM(s) Oral at bedtime PRN Insomnia

## 2025-07-08 NOTE — PROGRESS NOTE ADULT - PROBLEM SELECTOR PLAN 3
- Common complication in immobile patients, highlighting decreased mobility and increased care needs.  - Ulcer(s) pose risks of infection, pain, and further functional decline, compounding the challenges of advanced disease and often reflects an overall deterioration in health and a trajectory towards end-of-life.

## 2025-07-08 NOTE — PROGRESS NOTE ADULT - ATTENDING COMMENTS
Afebrile, HDS, AO x 1. No change since yesterday. Elderly female, lying in bed, NAD. Does not verbalize/make conversation w me.   MRI Brain showed only dural thickening at crani site and MRI L Spine shows severe canal stenosis at L4-L5 significant anterolisthesis with severe   ligamentum flavum thickening and facet joint hypertrophy. Palliative following pt, daughter would like her to go to [facility].   DC ACV, start PO ValACV 1gm q12h x 10 days, EOT is Afebrile, HDS, AO x 1. No change since yesterday. Elderly female, lying in bed, NAD. Does not verbalize/make conversation w me.   MRI Brain showed only dural thickening at crani site and MRI L Spine shows severe canal stenosis at L4-L5 significant anterolisthesis with severe   ligamentum flavum thickening and facet joint hypertrophy. Palliative following pt, daughter would like her to go to [facility].   DC ACV, start PO ValACV 1gm q12h x 10 days, EOT is 7/17.  After 7/18, pt can start PO ValACV HSV-2 ppx ; dose would be 500 mg q12h. Afebrile, HDS, AO x 1. No change since yesterday. Elderly female, lying in bed, NAD. Does not verbalize/make conversation w me.   MRI Brain showed only dural thickening at crani site and MRI L Spine shows severe canal stenosis at L4-L5 significant anterolisthesis with severe   ligamentum flavum thickening and facet joint hypertrophy. Palliative following pt, daughter would like her to go to [facility].   DC ACV, start PO ValACV 1gm q12h x 10 days, EOT is 7/17.  After 7/18, pt can start PO ValACV HSV-2 ppx ; dose would be 500 mg q12h.  ID Team 1 will sign off, please call us back as needed or with questions.

## 2025-07-08 NOTE — PROGRESS NOTE ADULT - PROBLEM SELECTOR PLAN 4
Patient's daughter started noticing blood in the stool for the past 4 days while changing her diapers. Initially, GI bleed was suspected, but CT angio of abdomen/pelvis was normal and patient's hgb was 12.1 but on examination patient has an oozing sacral decubitus ulcer on examination. Patient has been having difficulty walking due to leg swelling and has been bed bound recently.  Bleeding is likely 2/2 sacral decubitus ulcer.  HSV 2 positive    - awaiting wound care recs  - DVT ppx resumed Patient's daughter started noticing blood in the stool for the past 4 days while changing her diapers. Initially, GI bleed was suspected, but CT angio of abdomen/pelvis was normal and patient's hgb was 12.1 but on examination patient has an oozing sacral decubitus ulcer on examination. Patient has been having difficulty walking due to leg swelling and has been bed bound recently.  Bleeding is likely 2/2 sacral decubitus ulcer.  HSV 2 positive    - awaiting wound care recs  - continue DVT ppx Patient's daughter started noticing blood in the stool for the past 4 days while changing her diapers. Initially, GI bleed was suspected, but CT angio of abdomen/pelvis was normal and patient's hgb was 12.1 but on examination patient has an oozing sacral decubitus ulcer on examination. Patient has been having difficulty walking due to leg swelling and has been bed bound recently.  Bleeding is likely 2/2 sacral decubitus ulcer.  HSV 2 positive    - local wound care w/ foam dressing

## 2025-07-08 NOTE — PROGRESS NOTE ADULT - ASSESSMENT
I M    89y F with pmh Alzheimer's dementia, metastatic breast cancer, hypertension, stage 3a chronic kidney disease, hyperlipidemia, cerebral meningioma, hyponatremia, and vitamin D deficiency brought in by EMS for rectal bleeding. CTAP showed no GI bleeding. Was found to have bleeding sacral ulcer that was positive for HSV2 will treat with valtrexr. Also with new weakness, CTH showed no acute intracranial pathology. Course complicated by hypernatremia to 163 which resolved with D5 now at 142. MRI brain showing no acute intracranial pathology, and no brain metastasis. MRI lumbar showed L4 - L5 stenosis with no metastasis,         Problem/Plan - 1:  ·  Problem: Weakness.   ·  Plan: - new onset subjective weakness   - non-focal, neuro following   - MRI with/without contrast brain and L spine showing no acute intracranial pathology and no mets to brain or spine. Spine showing stenosis L4 - L5  -  Awaiting CK, Myasthenia Gravis/Lambert-Eaton Myasthenic Syndrome Evaluation, Acetylcholine Modulating AB, Acetylcholine Blocking, LRP4 Autoantibody Test, Musk antibody, Paraneoplastic panel, ganglioside Ab ordered, PND   - NIF/VC ordered PND.    Problem/Plan - 2:  ·  Problem: Urinary retention.   ·  Plan: - Hernández in place, will attempt TOV  - possible etiology: constipation, KUB with moderate fecal material in colon, miralax and senna ordered.    Problem/Plan - 3:  ·  Problem: Herpes simplex.   ·  Plan: Decubital ulcer PCR positive for HSV2  - Start valtrex 1g PO BID x 7 days.    Problem/Plan - 4:  ·  Problem: Sacral ulcer.   ·  Plan: Patient's daughter started noticing blood in the stool for the past 4 days while changing her diapers. Initially, GI bleed was suspected, but CT angio of abdomen/pelvis was normal and patient's hgb was 12.1 but on examination patient has an oozing sacral decubitus ulcer on examination. Patient has been having difficulty walking due to leg swelling and has been bed bound recently.  Bleeding is likely 2/2 sacral decubitus ulcer.  HSV 2 positive    - awaiting wound care recs  - continue DVT ppx.    Problem/Plan - 5:  ·  Problem: Hypernatremia.   ·  Plan: Patient has a history of CKD stage 3a and hyponatremia (possibly 2/2 SIADH per outpatient nephro note).  Sodium was 157 on admission, went up to 165 after the administration of 2L of NS.  Patient has 4.3L free water deficit  Renal consulted, given 165mL/hr D5  Na downtrending 144 > 142    - resolved.    Problem/Plan - 6:  ·  Problem: Acute kidney injury superimposed on CKD.   ·  Plan: Patient's daughter was told that the patient has CKD but patient never experienced any problems with urination and has appropriate urine output.  On admission: Cr 1.59, , eGFR 30  Now: Cr .87 BUN 34, eGFR 63    - RAFFI resolved.    Problem/Plan - 7:  ·  Problem: Breast cancer.   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 She was deemed as not a candidate for immunotherapy or surgery. She was started on chemotherapy and underwent 3 cycles, last one on 3-June 2025.  Per daughter, patient missed her cycle on Betina-15 due to difficulty ambulation.  She follows up with Dr. Poly Keith (med onc), Dr. Jesse Villeda (rad onc), and Dr. Mauri Arroyo (breast surgeon)    - Palliative consulted, f/u recs  - Dr. Keith and Dr. Arroyo were notified via email.    Problem/Plan - 8:  ·  Problem: Chronic hyponatremia.   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    - CTM.    Problem/Plan - 9:  ·  Problem: Dementia.   ·  Plan: Patient has a history of dementia. A&Ox1 to person only, but daughter states that this is the baseline and denies any deterioration and confusion.    - CTM.    Problem/Plan - 10:  ·  Problem: History of cerebral meningioma.   ·  Plan; Daughter reports the patient had a brain mass that was removed in 2015.    Problem/Plan - 11:  ·  Problem: Hypertension.   ·  Plan: Patient has a history of left frontal meningioma that was resected in 2015    Patient has a history of hypertension. She was prescribed valsartan 40mg per surescripts but hasn't been taking it for weeks per daughter.  Blood pressure on admission was 90/61 went up to 137/58 after fluid bolus in the ED.    - hold home valsartan    HLD  Patient has a history of hyperlipidemia. She was prescribed atorvastatin 10mg per surescripts  but hasn't been taking it for weeks per daughter.    - hold home meds.    Problem/Plan - 12:  ·  Problem: Prophylactic measure.   ·  Plan: Plan: F: tolerating PO, no IVF  E: replete K<4, Mg<2  N: puree    VTE Prophylaxis: Lovenox  GI: miralax  C: DNR/DNI  D: RMF.

## 2025-07-08 NOTE — PROGRESS NOTE ADULT - PROBLEM SELECTOR PLAN 2
Patient with several risk factors for chronic constipation including physical inactivity, concurrent medication use, decreased caloric intake, comorbidities, female gender  - Recommend mLax qAM 17 g/day standing, hold for loose stool  - Recommend senna 2 tabs PO q12h PRN No BM for >3 days  - If above is ineffective, recommend bisacodyl suppository   - Recommend daily fiber intake 20-25 g/day, increasing fluid intake and OOB/movement as able  - Primary goal is to relieve symptoms, secondary is to have at least 3-4 easy to pass BMs per week.

## 2025-07-08 NOTE — PROGRESS NOTE ADULT - PROBLEM SELECTOR PLAN 5
Patient has a history of CKD stage 3a and hyponatremia (possibly 2/2 SIADH per outpatient nephro note).  Sodium was 157 on admission, went up to 165 after the administration of 2L of NS.  Patient has 4.3L free water deficit  Renal consulted, given 165mL/hr D5  Na downtrending 144 > 142    - resolved

## 2025-07-08 NOTE — PROGRESS NOTE ADULT - PROBLEM SELECTOR PLAN 7
- DNR DNI  - daughter Amara would be surrogate decision maker in the absence of a documented HCP  - see GOC    Will continue to follow for ongoing GOC discussion / titration of palliative regimen. Emotional support provided, questions answered. Complex medical decision making / symptom management in the setting of br CA, AD     Active Psychosocial Referrals:  [x]Social Work/Case management [x]PT/OT [ ]Chaplaincy [ ]Hospice   []Holistic RN [x]Massage Therapy [x]Music Therapy []Ethics  Coping: [] well [] with difficulty [ ] poor coping [x] unable to assess  Support system: [x] strong [] adequate [ ] inadequate    For new or uncontrolled symptoms, please call Palliative Care at [phone]). The service is available 24/7 (including nights & weekends) to provide symptom management recommendations over the phone as appropriate.

## 2025-07-08 NOTE — PROGRESS NOTE ADULT - PROBLEM SELECTOR PLAN 2
- bladder scan with 390cc, straight cathed   - possible etiology: constipation, KUB with increased stool burden, miralax and senna ordered  - pinzon placed - Hernández in place  - possible etiology: constipation, KUB with increased stool burden, miralax and senna ordered - Hernández in place  - possible etiology: constipation, KUB with moderate fecal material in colon, miralax and senna ordered - Hernández in place, will attempt TOV  - possible etiology: constipation, KUB with moderate fecal material in colon, miralax and senna ordered - Hernández in place, will attempt TOV  - possible etiology: constipation, KUB today with moderate fecal material in colon, miralax and senna ordered

## 2025-07-08 NOTE — PROGRESS NOTE ADULT - PROBLEM SELECTOR PLAN 1
- new onset subjective weakness   - non-focal, neuro following   - MRI with/without contrast brain and L spine today  -  Awaiting CK, Myasthenia Gravis/Lambert-Eaton Myasthenic Syndrome Evaluation, Acetylcholine Modulating AB, Acetylcholine Blocking, LRP4 Autoantibody Test, Musk antibody, Paraneoplastic panel, ganglioside Ab ordered, PND   - NIF/VC ordered PND - new onset subjective weakness   - non-focal, neuro following   - MRI with/without contrast brain and L spine showing no acute intracranial pathology and no mets to brittani or spine. Spine showing stenosis L4 - L5  -  Awaiting CK, Myasthenia Gravis/Lambert-Eaton Myasthenic Syndrome Evaluation, Acetylcholine Modulating AB, Acetylcholine Blocking, LRP4 Autoantibody Test, Musk antibody, Paraneoplastic panel, ganglioside Ab ordered, PND   - NIF/VC ordered PND - new onset subjective weakness   - non-focal, neuro following   - MRI with/without contrast brain and L spine showing no acute intracranial pathology and no mets to brain or spine. Spine showing stenosis L4 - L5  -  Awaiting CK, Myasthenia Gravis/Lambert-Eaton Myasthenic Syndrome Evaluation, Acetylcholine Modulating AB, Acetylcholine Blocking, LRP4 Autoantibody Test, Musk antibody, Paraneoplastic panel, ganglioside Ab ordered, PND   - NIF/VC ordered PND

## 2025-07-08 NOTE — PROGRESS NOTE ADULT - ASSESSMENT
89F Alzheimer's dementia,  CKD, and stage IIIc triple-negative left breast cancer (s/p 3 cycles of neoadjuvant chemo) pw bleeding sacral ulcer, hyponatremia/hypernatremia, RAFFI, urinary retention, and functional decline. Palliative consulted to discuss goals of care and treatment preferences in the setting of advanced illness.    [facility] 7/7.  Recommend discussion between patient's daughter Melodie and patient's oncologist to clarify prognosis, treatment options (or lack thereof), and expected trajectory. This will help Melodie make informed decisions aligned with her mother's likely wishes and best interests.     Patient unlikely to return to a state where will safely tolerate further DMTx eg systemic chemo. Introduced home hospice. Given patient's deconditioning/debility and lack of adequate skilled nursing care at home, Melodie electing to dc to [facility].

## 2025-07-08 NOTE — PROGRESS NOTE ADULT - PROBLEM SELECTOR PLAN 3
Decubital ulcer PCR positive for HSV2  - Start valacyclovir 1g PO BID x 7 days  - neuro c/f possible elsberg syndrome, awaiting MRI brain and L-spine  - ID consult ordered for possible IV acycolvir, trish recs Decubital ulcer PCR positive for HSV2  - Start valtrex 1g PO BID x 7 days  - neuro c/f possible elsberg syndrome, awaiting MRI brain and L-spine  - ID consult ordered for possible IV acycolvir, appreciate recs Decubital ulcer PCR positive for HSV2  - Start valtrex 1g PO BID x 7 days, will give 250cc LR prior Decubital ulcer PCR positive for HSV2  - Start valtrex 1g PO BID x 7 days Decubital ulcer PCR positive for HSV2  - Start valtrex 1g PO BID x 10 days

## 2025-07-08 NOTE — PROGRESS NOTE ADULT - ATTENDING COMMENTS
MRI brain unrevealing but lumbosacral showed area of central canal stenosis L4-L5, no cauda equina enhancement. 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 Management of HSV2 per primary team and ID. AMS likely multifactorial from metabolic encephalopathy and HSV2 reactivation. MRI brain unrevealing but lumbosacral showed area of central canal stenosis L4-L5, no cauda equina enhancement. Improvement with normalization of hypernatremia and IV acyclovir for HSV2 positive ulcer with vesicles. Management of HSV2 per primary team and ID. AMS likely multifactorial from metabolic encephalopathy and HSV2 reactivation.

## 2025-07-08 NOTE — PROGRESS NOTE ADULT - PROBLEM SELECTOR PLAN 12
Plan: F: tolerating PO, no IVF  E: replete K<4, Mg<2  N: puree    VTE Prophylaxis: None  GI: miralax  C: DNR/DNI  D: RMF. Plan: F: tolerating PO, no IVF  E: replete K<4, Mg<2  N: puree    VTE Prophylaxis: Lovenox  GI: miralax  C: DNR/DNI  D: RMF.

## 2025-07-08 NOTE — PROGRESS NOTE ADULT - PROBLEM SELECTOR PLAN 4
alb 2.6-2.7  - Malnutrition secondary to malignancy is contributing to patient's progressive functional decline, increasing vulnerability to infections, and impairing wound healing, ultimately impacting prognosis; malnutrition further cb by progressive dementia   - nutrition consult, supportive care  - Allow food for comfort with strict aspiration precautions if patient is awake and willing.

## 2025-07-08 NOTE — PROGRESS NOTE ADULT - PROBLEM SELECTOR PLAN 6
- dementia in the context of multiple severe comorbidities (met br CA, CKD) contributes significantly to her medical fragility and increases her risk of further decline    - Delirium precautions: bedside window with blinds open; frequent re-orientation; pictures of family if possible; minimize lines and physical restraints, nighttime awakenings as medically feasible; ensure bowel movements daily or every other day, monitor for urinary retention; avoid anticholinergic medications or benzodiazepines as clinically feasible.

## 2025-07-08 NOTE — PROGRESS NOTE ADULT - SUBJECTIVE AND OBJECTIVE BOX
Physical Medicine and Rehabilitation Progress Note :       Patient is a 89y old  Female who presents with a chief complaint of Rectal bleeding (08 Jul 2025 08:32)      HPI:  89y F with pmh dementia, metastatic breast cancer, hypertension, stage 3a chronic kidney disease, hyperlipidemia, cerebral meningioma, hyponatremia, and vitamin D deficiency brought in by EMS for rectal bleeding.  The patient's daughter reports noticing blood (maroon and dark in nature) in the stool for the past 4 days and denies associated nausea, vomiting, or diarrhea. The daughter reports that the patient had an ulcer on her lower back for 2 weeks and that she occasionally scratches it. The patient hasn't been eating for the past week and has been given ensure and soup by he daughter.  The patient was started on CMF chemotherapy regimen received 3 cycles of treatment  with the last one being on Betina-3-2025. She was scheduled for her next cycle on Betina 15 but was unable to attend to it as she has been having difficulty ambulating and became bedbound since that time,  She had rectal bleeding in 2021 and underwent colonoscopy which showed multiple polyps, all of which were removed.  She follows up with Dr. Tyson Schumacher (PCP), Dr. Poly Keith (med onc), Dr. Jesse Vlileda (rad onc), and Dr. Mauri Arroyo (breast surgeon)      In the ED:  Vitals: T 97.9, BP 90/61, HR 96, RR 20, SpO2 98% (RA)  Labs: MCHC 28.6 (H), smudge cells, lactate 2.4, sodium 157, , Cr 1.59, eGFR 30, glucose 124, protein total 9.4 , albumin 2.7, Mg 3.1  Urine: cloudy, protein 30, RBC 99, WBC 7 , moderate leukocyte esterase, few bacteria  CXR: normal  Imaging: CT scan showed no evidence of active gastrointestinal bleeding. Partially visualized left breast mass largest measuring up to 4.7cm, and increase in size  2L fluids (05 Jul 2025 09:01)                            9.3    7.22  )-----------( 279      ( 08 Jul 2025 07:00 )             30.7       07-08    142  |  114[H]  |  22  ----------------------------<  98  3.7   |  19[L]  |  0.84    Ca    7.8[L]      08 Jul 2025 07:00  Phos  2.2     07-08  Mg     1.9     07-08    TPro  6.7  /  Alb  2.2[L]  /  TBili  0.4  /  DBili  x   /  AST  19  /  ALT  13  /  AlkPhos  57  07-08    Vital Signs Last 24 Hrs  T(C): 37.6 (08 Jul 2025 05:54), Max: 37.6 (08 Jul 2025 05:54)  T(F): 99.7 (08 Jul 2025 05:54), Max: 99.7 (08 Jul 2025 05:54)  HR: 100 (08 Jul 2025 05:54) (81 - 100)  BP: 131/67 (08 Jul 2025 05:54) (108/62 - 131/67)  BP(mean): 88 (08 Jul 2025 05:54) (78 - 88)  RR: 18 (08 Jul 2025 05:54) (18 - 19)  SpO2: 97% (08 Jul 2025 05:54) (96% - 97%)    Parameters below as of 08 Jul 2025 05:54  Patient On (Oxygen Delivery Method): room air        MEDICATIONS  (STANDING):  dextrose 5%. 1000 milliLiter(s) (100 mL/Hr) IV Continuous <Continuous>  dextrose 5%. 1000 milliLiter(s) (50 mL/Hr) IV Continuous <Continuous>  dextrose 50% Injectable 25 Gram(s) IV Push once  dextrose 50% Injectable 12.5 Gram(s) IV Push once  dextrose 50% Injectable 25 Gram(s) IV Push once  enoxaparin Injectable 40 milliGRAM(s) SubCutaneous every 24 hours  glucagon  Injectable 1 milliGRAM(s) IntraMuscular once  lactated ringers Bolus 250 milliLiter(s) IV Bolus once  multivitamin 1 Tablet(s) Oral daily  polyethylene glycol 3350 17 Gram(s) Oral every 12 hours  potassium phosphate / sodium phosphate Powder (PHOS-NaK) 2 Packet(s) Oral once  senna 2 Tablet(s) Oral at bedtime  valACYclovir 1000 milliGRAM(s) Oral two times a day    MEDICATIONS  (PRN):  acetaminophen     Tablet .. 650 milliGRAM(s) Oral every 6 hours PRN Temp greater or equal to 38C (100.4F), Mild Pain (1 - 3)  aluminum hydroxide/magnesium hydroxide/simethicone Suspension 30 milliLiter(s) Oral every 4 hours PRN Dyspepsia  benzonatate 100 milliGRAM(s) Oral three times a day PRN Cough  dextrose Oral Gel 15 Gram(s) Oral once PRN Blood Glucose LESS THAN 70 milliGRAM(s)/deciliter  melatonin 3 milliGRAM(s) Oral at bedtime PRN Insomnia      T(C): 37.6 (07-08-25 @ 05:54), Max: 37.6 (07-08-25 @ 05:54)  HR: 100 (07-08-25 @ 05:54) (81 - 100)  BP: 131/67 (07-08-25 @ 05:54) (108/62 - 131/67)  RR: 18 (07-08-25 @ 05:54) (18 - 19)  SpO2: 97% (07-08-25 @ 05:54) (96% - 97%)    Physical Exam:    89 y o woman  lying comfortably in semi Rodriguez's position , somnolent ,  NAD     Head: normocephalic , atraumatic    Eyes: PERRLA , EOMI , no nystagmus , sclera anicteric    ENT / FACE: neg nasal discharge , uvula midline , no oropharyngeal erythema / exudate    Neck: supple , negative JVD , negative carotid bruits , no thyromegaly    Chest: CTA bilaterally      Cardiovascular: regular rate and rhythm , neg murmurs / rubs / gallops    Abdomen: soft , non distended , no tenderness to palpation in all 4 quadrants ,  normal bowel sounds     Extremities: WWP , neg cyanosis /clubbing / edema     Neurologic Exam:     somnolent and oriented to person     Cranial Nerves:           II:                         pupils equal , round and reactive to light , visual fields intact         III/ IV/VI:             extraocular movements intact , neg nystagmus , neg ptosis        V:                        facial sensation intact , V1-3 normal        VII:                      face symmetric , no droop , normal eye closure and smile        VIII:                     hearing intact to finger rub bilaterally        IX and X:             no hoarseness , gag intact , palate/ uvula rise symmetrically        XI:                       SCM / trapezius strength intact bilateral        XII:                      no tongue deviation    Motor Exam:        poor effort 2/2 mental status > 3/5 x 4 extremities     Sensation:         withdraws to pinch  x 4 extremities     DTR:           biceps/brachioradialis: equal                            patella/ankle: equal          neg Babinski       7/7/2025 Functional Status Assessment :       Pain Assessment/Number Scale (0-10) Adult  Presence of Pain: denies pain/discomfort (Rating = 0)  Pain Rating (0-10): Rest: 0 (no pain/absence of nonverbal indicators of pain)  Pain Rating (0-10): Activity: 0 (no pain/absence of nonverbal indicators of pain)    Safety      [facility] Functional Assessment: Basic Mobility  Type of Assessment: Daily assessment  Turning from your back to your side while in a flat bed without using bedrails?: 1 = Total assistance  Moving from lying on your back to sitting on the flat side of a flat bed without using bedrails?: 1 = Total assistance  Moving to and from a bed to a chair (including a wheelchair)?: 1 = Total assistance  Standing up from a chair using your arms (e.g. wheelchair or bedside chair)?: 1 = Total assistance  Walking in hospital room?: 1 = Total assistance  Climbing 3-5 steps with a railing?: 1 = Total assistance     Score: 6   Row Comment: Ask the patient "How much help from another person do you currently need? (If the patient hasn't done an activity recently, how much help from another person do you think he/she needs if he/she tried?)    Therapeutic Interventions      Bed Mobility  Bed Mobility Training Rehab Potential: fair, will monitor progress closely  Bed Mobility Training Symptoms Noted During/After Treatment: fatigue  Bed Mobility Training Rolling/Turning: moderate assist (50% patient effort);  1 person assist  Bed Mobility Training Scooting: maximum assist (25% patient effort);  2 person assist  Bed Mobility Training Sit-to-Supine: maximum assist (25% patient effort);  2 person assist  Bed Mobility Training Supine-to-Sit: maximum assist (25% patient effort);  2 person assist  Bed Mobility Training Limitations: decreased strength;  impaired balance;  impaired postural control;  impaired motor control    Sit-Stand Transfer Training  Sit-to-Stand Transfer Training Rehab Potential: fair, will monitor progress closely  Sit-to-Stand Transfer Training Symptoms Noted During/After Treatment: fatigue  Transfer Training Sit-to-Stand Transfer: maximum assist (25% patient effort);  2 person assist;  B hand held assist  Transfer Training Stand-to-Sit Transfer: maximum assist (25% patient effort);  2 person assist;  B hand held assist  Sit-to-Stand Transfer Training Transfer Safety Analysis: standing x 30 sec x 2 trials;  decreased strength;  impaired balance;  impaired motor control;  impaired postural control    Gait Training  Gait Training: unable to perform    Therapeutic Exercise  Therapeutic Exercise Detail: seated therapeutic exercise: B long arc quads, AAROM marching, AAROM shoulder flex/ext , AAROM elbow flex/ext x 10 reps       Fine Motor Coordination:   · Left Hand, Manipulation of Objects  normal performance  3+/5   · Right Hand, Manipulation of Objects  mild impairment  3/5     Upper Body Dressing Training:     · Level of Mobile  moderate assist (50% patients effort); donning back gown while seated at EOB  · Physical Assist/Nonphysical Assist  1 person assist; nonverbal cues (demo/gestures); verbal cues          PM&R Impression : as above    Current disposition plan recommendation :    subacute rehab placement

## 2025-07-08 NOTE — PROGRESS NOTE ADULT - SUBJECTIVE AND OBJECTIVE BOX
NEUROLOGY CONSULT PROGRESS NOTE    INTERVAL HISTORY:      REVIEW OF SYSTEMS:  As per HPI, otherwise negative for Constitutional, Eyes, Ears/Nose/Mouth/Throat, Neck, Cardiovascular, Respiratory, Gastrointestinal, Genitourinary, Skin, Endocrine, Musculoskeletal, Psychiatric, and Hematologic/Lymphatic.    MEDICATIONS:  acetaminophen     Tablet .. 650 milliGRAM(s) Oral every 6 hours PRN  acyclovir IVPB 650 milliGRAM(s) IV Intermittent every 12 hours  aluminum hydroxide/magnesium hydroxide/simethicone Suspension 30 milliLiter(s) Oral every 4 hours PRN  benzonatate 100 milliGRAM(s) Oral three times a day PRN  dextrose 5%. 1000 milliLiter(s) IV Continuous <Continuous>  dextrose 5%. 1000 milliLiter(s) IV Continuous <Continuous>  dextrose 50% Injectable 25 Gram(s) IV Push once  dextrose 50% Injectable 12.5 Gram(s) IV Push once  dextrose 50% Injectable 25 Gram(s) IV Push once  dextrose Oral Gel 15 Gram(s) Oral once PRN  enoxaparin Injectable 40 milliGRAM(s) SubCutaneous every 24 hours  glucagon  Injectable 1 milliGRAM(s) IntraMuscular once  melatonin 3 milliGRAM(s) Oral at bedtime PRN  multivitamin 1 Tablet(s) Oral daily  polyethylene glycol 3350 17 Gram(s) Oral every 12 hours  senna 2 Tablet(s) Oral at bedtime    VITAL SIGNS:  Vital Signs Last 24 Hrs  T(C): 37.6 (08 Jul 2025 05:54), Max: 37.6 (08 Jul 2025 05:54)  T(F): 99.7 (08 Jul 2025 05:54), Max: 99.7 (08 Jul 2025 05:54)  HR: 100 (08 Jul 2025 05:54) (81 - 100)  BP: 131/67 (08 Jul 2025 05:54) (108/62 - 131/67)  BP(mean): 88 (08 Jul 2025 05:54) (78 - 88)  RR: 18 (08 Jul 2025 05:54) (18 - 19)  SpO2: 97% (08 Jul 2025 05:54) (96% - 97%)    Parameters below as of 08 Jul 2025 05:54  Patient On (Oxygen Delivery Method): room air        PHYSICAL EXAMINATION:  Constitutional: WDWN; NAD  Eyes: anicteric sclera  Cardiovascular: +S1/S2, RRR; no M/R/G  Neurologic:  - Mental Status:  AAOx3; speech is fluent with intact naming, repetition, and comprehension  - Cranial Nerves II-XII:    II:  PERRLA; visual fields are full to confrontation  III, IV, VI:  EOMI, no nystagmus  V:  facial sensation is intact in the V1-V3 distribution bilaterally.  VII:  face is symmetric with normal eye closure and smile  VIII:  hearing is intact to finger rub  IX, X:  uvula is midline and soft palate rises symmetrically  XI:  head turning and shoulder shrug are intact bilaterally  XII:  tongue protrudes in the midline  - Motor:  strength is 5/5 throughout; normal muscle bulk and tone throughout; no pronator drift  - Reflexes:  2+ and symmetric at the biceps, triceps, brachioradialis, knees, and ankles;  plantar reflexes downgoing bilaterally  - Sensory:  intact to light touch, pin prick, vibration, and joint-position sense throughout  - Coordination:  finger-nose-finger and heel-knee-shin intact without dysmetria; rapid alternating hand movements intact  - Gait:  normal steps, base, arm swing, and turning; heel and toe walking are normal; tandem gait is normal; Romberg testing is negative    LABS:                          9.3    7.22  )-----------( 279      ( 08 Jul 2025 07:00 )             30.7     07-08    142  |  114[H]  |  22  ----------------------------<  98  3.7   |  19[L]  |  0.84    Ca    7.8[L]      08 Jul 2025 07:00  Phos  2.2     07-08  Mg     1.9     07-08    TPro  6.7  /  Alb  2.2[L]  /  TBili  0.4  /  DBili  x   /  AST  19  /  ALT  13  /  AlkPhos  57  07-08      Urinalysis Basic - ( 08 Jul 2025 07:00 )    Color: x / Appearance: x / SG: x / pH: x  Gluc: 98 mg/dL / Ketone: x  / Bili: x / Urobili: x   Blood: x / Protein: x / Nitrite: x   Leuk Esterase: x / RBC: x / WBC x   Sq Epi: x / Non Sq Epi: x / Bacteria: x        RADIOLOGY & ADDITIONAL STUDIES:      IMPRESSION & RECOMMENDATIONS:

## 2025-07-08 NOTE — PROGRESS NOTE ADULT - SUBJECTIVE AND OBJECTIVE BOX
[facility] Geriatrics and Palliative Care  Christine Santacruz Geriatrics & Palliative Care NP  Contact Info: Call [phone] (HEAL Line) or message on Microsoft Teams    SUBJECTIVE/OBJECTIVE: Interval events noted. MRI brain showing no acute intracranial pathology, and no brain metastasis. MRI lumbar showed L4 - L5 stenosis with no metastasis. See patient's PRN use for the past 24hrs noted below. Unable to obtain ROS from patient due to baseline cognitive impairment from dementia. Per staff, patient pulling lines overnight. Ate well this morning.  Comprehensive symptom assessment as below. Unable to connect with daughter, Melodie, for support or further GOC.     ALLERGIES: No Known Allergies      MEDICATIONS: REVIEWED  MEDICATIONS  (STANDING):  dextrose 5%. 1000 milliLiter(s) (100 mL/Hr) IV Continuous <Continuous>  dextrose 5%. 1000 milliLiter(s) (50 mL/Hr) IV Continuous <Continuous>  dextrose 50% Injectable 25 Gram(s) IV Push once  dextrose 50% Injectable 12.5 Gram(s) IV Push once  dextrose 50% Injectable 25 Gram(s) IV Push once  enoxaparin Injectable 40 milliGRAM(s) SubCutaneous every 24 hours  glucagon  Injectable 1 milliGRAM(s) IntraMuscular once  multivitamin 1 Tablet(s) Oral daily  polyethylene glycol 3350 17 Gram(s) Oral every 12 hours  senna 2 Tablet(s) Oral at bedtime  valACYclovir 1000 milliGRAM(s) Oral two times a day    MEDICATIONS  (PRN):  acetaminophen     Tablet .. 650 milliGRAM(s) Oral every 6 hours PRN Temp greater or equal to 38C (100.4F), Mild Pain (1 - 3)  aluminum hydroxide/magnesium hydroxide/simethicone Suspension 30 milliLiter(s) Oral every 4 hours PRN Dyspepsia  benzonatate 100 milliGRAM(s) Oral three times a day PRN Cough  dextrose Oral Gel 15 Gram(s) Oral once PRN Blood Glucose LESS THAN 70 milliGRAM(s)/deciliter  melatonin 3 milliGRAM(s) Oral at bedtime PRN Insomnia    Analgesic Use (Scheduled and PRNs) for past 24 hours (6a-6a):      PRESENT SYMPTOMS:   [x ] Patient unable to self report   Source if other than patient:  [x ]Family   [x ]Team     Pain [  ]  PAIN AD Score: 0  [email or web address] (press ctrl +  left click to view)    If [  ], pt denies symptom.   Dyspnea:         [  ]  Anxiety:           [  ]  Difficulty sleeping: [  ]  Fatigue:           [ x ]  Nausea:           [  ]  Loss of appetite:     [ x ]  Dysphagia: [  ]  Constipation:   [  ]        Other Symptoms:    All other review of systems negative [x  ]    ECOG Performance:     3  Current Palliative Performance Scale/Karnofsky Score:  40  %  Preadmit Karnofsky:  50  %          PEx:  General: tired appearing older woman sitting up in bed, NAD  oriented x    1   lethargic, minimally verbal  NEURO:  +cognitive impairment,+ encephalopathic No dsyphagia dysarthria paresis  Behavioral: Withdrawn   HEENT:  No temporal wasting,  No dry mouth  RESP: Reg rhythm, No  tachypnea/labored breathing,  CTAB, diminished bilat bases  CV:  No  tachycardia  GI: soft non distended non tender    MUSK: weakness x4, No edema,  non ambulatory   SKIN:  Poor skin turgor, Pallor, Pressure ulcer stage: multiple pressure wounds, DTE see primary team comprehensive assessment; skin assessment deferred for patient comfort     T(C): 37 (07-08-25 @ 12:14), Max: 37.6 (07-08-25 @ 05:54)  HR: 91 (07-08-25 @ 12:14) (91 - 100)  BP: 107/58 (07-08-25 @ 12:14) (107/58 - 131/67)  RR: 18 (07-08-25 @ 12:14) (18 - 19)  SpO2: 97% (07-08-25 @ 12:14) (97% - 97%)  Wt(kg): --      LABS: REVIEWED  CBC:                        9.3    7.22  )-----------( 279      ( 08 Jul 2025 07:00 )             30.7     CMP:    07-08    142  |  114[H]  |  22  ----------------------------<  98  3.7   |  19[L]  |  0.84    Ca    7.8[L]      08 Jul 2025 07:00  Phos  2.2     07-08  Mg     1.9     07-08    TPro  6.7  /  Alb  2.2[L]  /  TBili  0.4  /  DBili  x   /  AST  19  /  ALT  13  /  AlkPhos  57  07-08      RADIOLOGY & ADDITIONAL STUDIES: Reviewed MRI    DISCUSSION OF CASE: Family - to provide updates and emotional support; Primary Team and RN - to discuss plan of care    CARE COORDINATION:       PROGRESS NOTE  Date & Time of Note   2025-07-08 16:21    Notes    Notes: HASMUKH met with pt's daughter who met with Dr. Geronimo to discuss d/c option.   Per the daughter, she is now open to pt's going to short term rehab with  eventual d/c to home with hospital bed.  Pt's daughter, Bozena expressed that  she would like pt go to [facility] and [facility] as their  choice.  Pt referred to multiple facilities in [place].  Pt did not get  accepted to [facility] and [facility].  Pt accepted to only 1  facility, [facility].  Pt's daughter met with [facility] [place] Liaison,  Bart for Q&A.      Pt's daughter accepted the bed offer at [facility] and tasked DPC to  initiate insurance authorization.      HASMUKH will continue to follow pt's insurance auth to go to [facility].       Electronically signed by:  Marianela Levin  Electronically signed on:  2025-07-08  16:26

## 2025-07-09 DIAGNOSIS — E87.0 HYPEROSMOLALITY AND HYPERNATREMIA: ICD-10-CM

## 2025-07-09 DIAGNOSIS — N17.9 ACUTE KIDNEY FAILURE, UNSPECIFIED: ICD-10-CM

## 2025-07-09 LAB
ALBUMIN SERPL ELPH-MCNC: 2.4 G/DL — LOW (ref 3.3–5)
ALP SERPL-CCNC: 66 U/L — SIGNIFICANT CHANGE UP (ref 40–120)
ALT FLD-CCNC: 18 U/L — SIGNIFICANT CHANGE UP (ref 10–45)
ANION GAP SERPL CALC-SCNC: 11 MMOL/L — SIGNIFICANT CHANGE UP (ref 5–17)
AST SERPL-CCNC: 31 U/L — SIGNIFICANT CHANGE UP (ref 10–40)
BASOPHILS # BLD AUTO: 0.03 K/UL — SIGNIFICANT CHANGE UP (ref 0–0.2)
BASOPHILS NFR BLD AUTO: 0.4 % — SIGNIFICANT CHANGE UP (ref 0–2)
BILIRUB SERPL-MCNC: 0.3 MG/DL — SIGNIFICANT CHANGE UP (ref 0.2–1.2)
BUN SERPL-MCNC: 27 MG/DL — HIGH (ref 7–23)
CALCIUM SERPL-MCNC: 8 MG/DL — LOW (ref 8.4–10.5)
CHLORIDE SERPL-SCNC: 113 MMOL/L — HIGH (ref 96–108)
CO2 SERPL-SCNC: 19 MMOL/L — LOW (ref 22–31)
CREAT SERPL-MCNC: 0.95 MG/DL — SIGNIFICANT CHANGE UP (ref 0.5–1.3)
EGFR: 57 ML/MIN/1.73M2 — LOW
EGFR: 57 ML/MIN/1.73M2 — LOW
EOSINOPHIL # BLD AUTO: 0.65 K/UL — HIGH (ref 0–0.5)
EOSINOPHIL NFR BLD AUTO: 8.6 % — HIGH (ref 0–6)
GLUCOSE SERPL-MCNC: 99 MG/DL — SIGNIFICANT CHANGE UP (ref 70–99)
HCT VFR BLD CALC: 32.7 % — LOW (ref 34.5–45)
HGB BLD-MCNC: 9.5 G/DL — LOW (ref 11.5–15.5)
IMM GRANULOCYTES # BLD AUTO: 0.05 K/UL — SIGNIFICANT CHANGE UP (ref 0–0.07)
IMM GRANULOCYTES NFR BLD AUTO: 0.7 % — SIGNIFICANT CHANGE UP (ref 0–0.9)
LYMPHOCYTES # BLD AUTO: 0.87 K/UL — LOW (ref 1–3.3)
LYMPHOCYTES NFR BLD AUTO: 11.4 % — LOW (ref 13–44)
MAGNESIUM SERPL-MCNC: 2.1 MG/DL — SIGNIFICANT CHANGE UP (ref 1.6–2.6)
MCHC RBC-ENTMCNC: 27.9 PG — SIGNIFICANT CHANGE UP (ref 27–34)
MCHC RBC-ENTMCNC: 29.1 G/DL — LOW (ref 32–36)
MCV RBC AUTO: 95.9 FL — SIGNIFICANT CHANGE UP (ref 80–100)
MONOCYTES # BLD AUTO: 0.56 K/UL — SIGNIFICANT CHANGE UP (ref 0–0.9)
MONOCYTES NFR BLD AUTO: 7.4 % — SIGNIFICANT CHANGE UP (ref 2–14)
NEUTROPHILS # BLD AUTO: 5.44 K/UL — SIGNIFICANT CHANGE UP (ref 1.8–7.4)
NEUTROPHILS NFR BLD AUTO: 71.5 % — SIGNIFICANT CHANGE UP (ref 43–77)
NRBC # BLD AUTO: 0 K/UL — SIGNIFICANT CHANGE UP (ref 0–0)
NRBC # FLD: 0 K/UL — SIGNIFICANT CHANGE UP (ref 0–0)
NRBC BLD AUTO-RTO: 0 /100 WBCS — SIGNIFICANT CHANGE UP (ref 0–0)
PHOSPHATE SERPL-MCNC: 3.1 MG/DL — SIGNIFICANT CHANGE UP (ref 2.5–4.5)
PLATELET # BLD AUTO: 267 K/UL — SIGNIFICANT CHANGE UP (ref 150–400)
PMV BLD: 11.7 FL — SIGNIFICANT CHANGE UP (ref 7–13)
POTASSIUM SERPL-MCNC: 4.3 MMOL/L — SIGNIFICANT CHANGE UP (ref 3.5–5.3)
POTASSIUM SERPL-SCNC: 4.3 MMOL/L — SIGNIFICANT CHANGE UP (ref 3.5–5.3)
PROT SERPL-MCNC: 7.2 G/DL — SIGNIFICANT CHANGE UP (ref 6–8.3)
RBC # BLD: 3.41 M/UL — LOW (ref 3.8–5.2)
RBC # FLD: 20.2 % — HIGH (ref 10.3–14.5)
SODIUM SERPL-SCNC: 143 MMOL/L — SIGNIFICANT CHANGE UP (ref 135–145)
WBC # BLD: 7.6 K/UL — SIGNIFICANT CHANGE UP (ref 3.8–10.5)
WBC # FLD AUTO: 7.6 K/UL — SIGNIFICANT CHANGE UP (ref 3.8–10.5)

## 2025-07-09 PROCEDURE — 83735 ASSAY OF MAGNESIUM: CPT

## 2025-07-09 PROCEDURE — 85730 THROMBOPLASTIN TIME PARTIAL: CPT

## 2025-07-09 PROCEDURE — 71045 X-RAY EXAM CHEST 1 VIEW: CPT

## 2025-07-09 PROCEDURE — 82570 ASSAY OF URINE CREATININE: CPT

## 2025-07-09 PROCEDURE — 83690 ASSAY OF LIPASE: CPT

## 2025-07-09 PROCEDURE — 87798 DETECT AGENT NOS DNA AMP: CPT

## 2025-07-09 PROCEDURE — 86043 ACETYLCHOLN RCPTR MODLG ANTB: CPT

## 2025-07-09 PROCEDURE — 82550 ASSAY OF CK (CPK): CPT

## 2025-07-09 PROCEDURE — 70553 MRI BRAIN STEM W/O & W/DYE: CPT

## 2025-07-09 PROCEDURE — 87086 URINE CULTURE/COLONY COUNT: CPT

## 2025-07-09 PROCEDURE — 87040 BLOOD CULTURE FOR BACTERIA: CPT

## 2025-07-09 PROCEDURE — 84132 ASSAY OF SERUM POTASSIUM: CPT

## 2025-07-09 PROCEDURE — 74174 CTA ABD&PLVS W/CONTRAST: CPT

## 2025-07-09 PROCEDURE — 85610 PROTHROMBIN TIME: CPT

## 2025-07-09 PROCEDURE — 86366 MUSCLE-SPECIFIC KINASE ANTB: CPT

## 2025-07-09 PROCEDURE — 85025 COMPLETE CBC W/AUTO DIFF WBC: CPT

## 2025-07-09 PROCEDURE — 97161 PT EVAL LOW COMPLEX 20 MIN: CPT

## 2025-07-09 PROCEDURE — 72158 MRI LUMBAR SPINE W/O & W/DYE: CPT

## 2025-07-09 PROCEDURE — 36415 COLL VENOUS BLD VENIPUNCTURE: CPT

## 2025-07-09 PROCEDURE — 82330 ASSAY OF CALCIUM: CPT

## 2025-07-09 PROCEDURE — 86850 RBC ANTIBODY SCREEN: CPT

## 2025-07-09 PROCEDURE — 99233 SBSQ HOSP IP/OBS HIGH 50: CPT

## 2025-07-09 PROCEDURE — 84156 ASSAY OF PROTEIN URINE: CPT

## 2025-07-09 PROCEDURE — 86900 BLOOD TYPING SEROLOGIC ABO: CPT

## 2025-07-09 PROCEDURE — 84540 ASSAY OF URINE/UREA-N: CPT

## 2025-07-09 PROCEDURE — 84295 ASSAY OF SERUM SODIUM: CPT

## 2025-07-09 PROCEDURE — 97165 OT EVAL LOW COMPLEX 30 MIN: CPT

## 2025-07-09 PROCEDURE — 99233 SBSQ HOSP IP/OBS HIGH 50: CPT | Mod: GC

## 2025-07-09 PROCEDURE — 84133 ASSAY OF URINE POTASSIUM: CPT

## 2025-07-09 PROCEDURE — 83605 ASSAY OF LACTIC ACID: CPT

## 2025-07-09 PROCEDURE — 86901 BLOOD TYPING SEROLOGIC RH(D): CPT

## 2025-07-09 PROCEDURE — 80048 BASIC METABOLIC PNL TOTAL CA: CPT

## 2025-07-09 PROCEDURE — 97110 THERAPEUTIC EXERCISES: CPT

## 2025-07-09 PROCEDURE — 81001 URINALYSIS AUTO W/SCOPE: CPT

## 2025-07-09 PROCEDURE — 86042 ACETYLCHOLN RCPTR BLCKG ANTB: CPT

## 2025-07-09 PROCEDURE — 86255 FLUORESCENT ANTIBODY SCREEN: CPT

## 2025-07-09 PROCEDURE — 82803 BLOOD GASES ANY COMBINATION: CPT

## 2025-07-09 PROCEDURE — 70450 CT HEAD/BRAIN W/O DYE: CPT

## 2025-07-09 PROCEDURE — 80053 COMPREHEN METABOLIC PANEL: CPT

## 2025-07-09 PROCEDURE — 83516 IMMUNOASSAY NONANTIBODY: CPT

## 2025-07-09 PROCEDURE — 83036 HEMOGLOBIN GLYCOSYLATED A1C: CPT

## 2025-07-09 PROCEDURE — A9585: CPT

## 2025-07-09 PROCEDURE — 74018 RADEX ABDOMEN 1 VIEW: CPT

## 2025-07-09 PROCEDURE — 84100 ASSAY OF PHOSPHORUS: CPT

## 2025-07-09 PROCEDURE — 84300 ASSAY OF URINE SODIUM: CPT

## 2025-07-09 PROCEDURE — 82962 GLUCOSE BLOOD TEST: CPT

## 2025-07-09 PROCEDURE — 87529 HSV DNA AMP PROBE: CPT

## 2025-07-09 PROCEDURE — 83930 ASSAY OF BLOOD OSMOLALITY: CPT

## 2025-07-09 RX ORDER — CALAMINE 8% AND ZINC OXIDE 8% 160 MG/ML
1 LOTION TOPICAL ONCE
Refills: 0 | Status: COMPLETED | OUTPATIENT
Start: 2025-07-09 | End: 2025-07-09

## 2025-07-09 RX ADMIN — ENOXAPARIN SODIUM 40 MILLIGRAM(S): 100 INJECTION SUBCUTANEOUS at 17:09

## 2025-07-09 RX ADMIN — Medication 1 TABLET(S): at 11:59

## 2025-07-09 RX ADMIN — POLYETHYLENE GLYCOL 3350 17 GRAM(S): 17 POWDER, FOR SOLUTION ORAL at 05:24

## 2025-07-09 RX ADMIN — Medication 1000 MILLIGRAM(S): at 17:09

## 2025-07-09 RX ADMIN — Medication 2 TABLET(S): at 21:01

## 2025-07-09 RX ADMIN — CALAMINE 8% AND ZINC OXIDE 8% 1 APPLICATION(S): 160 LOTION TOPICAL at 07:12

## 2025-07-09 RX ADMIN — Medication 1000 MILLIGRAM(S): at 05:24

## 2025-07-09 RX ADMIN — POLYETHYLENE GLYCOL 3350 17 GRAM(S): 17 POWDER, FOR SOLUTION ORAL at 17:10

## 2025-07-09 NOTE — DISCHARGE NOTE PROVIDER - NSDCCPCAREPLAN_GEN_ALL_CORE_FT
PRINCIPAL DISCHARGE DIAGNOSIS  Diagnosis: Sacral ulcer  Assessment and Plan of Treatment: You were seen at [facility] with a bleeding ulcer that was found to test positive for Herpes Simplex Virus. You were prescribed Valtrex 1g Three times per day for 10 days.      SECONDARY DISCHARGE DIAGNOSES  Diagnosis: RAFFI (acute kidney injury)  Assessment and Plan of Treatment:     Diagnosis: Hypernatremia  Assessment and Plan of Treatment:      PRINCIPAL DISCHARGE DIAGNOSIS  Diagnosis: Sacral ulcer  Assessment and Plan of Treatment: You were seen at [facility] with a bleeding ulcer that was found to test positive for Herpes Simplex Virus. You were prescribed Valtrex 1g Three times per day for 10 days. Afterwards you are instructed to take Valtrex 500mg twice per day indefinitely for HSV suppression therapy. Also you were found to have aspiration pneumonitis, an irritation of your lungs from swallowing into your lungs. Pleae continue to take ceftriaxone 1000mg once per day IV for 4 days. Please follow up with your PCP as well as Dr Keith and Dr Arroyo 1-2 weeks after discharge

## 2025-07-09 NOTE — PROGRESS NOTE ADULT - PROBLEM SELECTOR PLAN 4
Patient's daughter started noticing blood in the stool for the past 4 days while changing her diapers. Initially, GI bleed was suspected, but CT angio of abdomen/pelvis was normal and patient's hgb was 12.1 but on examination patient has an oozing sacral decubitus ulcer on examination. Patient has been having difficulty walking due to leg swelling and has been bed bound recently.  Bleeding is likely 2/2 sacral decubitus ulcer.  HSV 2 positive    - local wound care w/ foam dressing

## 2025-07-09 NOTE — PROGRESS NOTE ADULT - PROBLEM SELECTOR PLAN 1
new funct quad in days prior to admission. pps 40%.   - mgmt per primary team, MRI w/o brain mets. 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

## 2025-07-09 NOTE — PROGRESS NOTE ADULT - PROBLEM SELECTOR PLAN 1
- new onset subjective weakness   - non-focal, neuro following   - MRI with/without contrast brain and L spine showing no acute intracranial pathology and no mets to brain or spine. Spine showing stenosis L4 - L5  -  Awaiting CK, Myasthenia Gravis/Lambert-Eaton Myasthenic Syndrome Evaluation, Acetylcholine Modulating AB, Acetylcholine Blocking, LRP4 Autoantibody Test, Musk antibody, Paraneoplastic panel, ganglioside Ab ordered, PND   - NIF/VC ordered PND

## 2025-07-09 NOTE — PROGRESS NOTE ADULT - PROBLEM SELECTOR PLAN 2
Patient with several risk factors for chronic constipation including physical inactivity, concurrent medication use, decreased caloric intake, comorbidities, female gender  - Recommend mLax qAM 17 g/day standing, hold for loose stool  - Recommend senna 2 tabs PO q12h PRN No BM for >3 days  - If above is ineffective, recommend bisacodyl suppository   - Recommend daily fiber intake 20-25 g/day, increasing fluid intake and OOB/movement as able  - Primary goal is to relieve symptoms, secondary is to have at least 3-4 easy to pass BMs per week. no concerns

## 2025-07-09 NOTE — PROGRESS NOTE ADULT - ASSESSMENT
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 herpetic looking) and found to have ME/FTT with hyponatremia, RAFFI on CKD3a and functional quadriplegia Vs gait abnormality /bed bound for 5 days.     # rectal bleed likely 2/2 sacral ulcer ( HSV detected)   # ruled Elsberg syndrome ( HSV myeloradiculitis)   - MRI lumber spine showed no enhancement of thecal sac, pt is clinically better from ME/corrected hypernatremia and RAFFI  - ID consult appreciated d/w Dr. Dorsey, agreed to d/c acyclovir and resume valtrex 1g PO bid for total 7 days for HSV sacral ulcer   -  Valtrex 1g PO bid for 10 days ( 7/7-7/16) then reduced to 500mg po bid for HSV ppx if continuing on chemoRx   - Neuro f/u appreicated as d/w   - local wound care w/ foam dressing    # Hx Hyponatremia/SIADH  # Hypernatremia ( corrected)   # RAFFI corrected ( likely 2/2 prerenal azotemia Vs ischemic ATN) on CKD3a  # acute urinary retention likely 2/2 constipation( moderate stool burden seen on CT)   - Renal f/u appreciated  - SNa baseline 133( 4/15/25)-> 157 ( ED 7/5)-> 165 peaked -> 157 -> 152-> 142(7/7)- >143( 7/9)   - SCr baseline 0.7( 4/15/25)-> 1.61 (ED)-> 1.61 peaked -> 1.21-> 1.1-> 0.87( 7/7)-> 0.95( 7/9)   - pinzon cath placement ( 7/7) , had BM. will d/c pinzon (7/8) and passed TOV, f/u bladder scan q6hr for PVR 208cc and 151cc, may d/c bladder scan now , pt on primafit  - AXR( 7/8): moderate stool burden  - bowel regimen for constipation; c/w Senna 2 tabs qHS and miralax 17g PO daily ok  # FTT likely related to chemoRx   # Protein Calorie malnutrition (albumin 2.6-2.7)   - Nutrition consult   - Ensure max protein (Vanilla) bid     # Deconditioning   # Functional quadriplegia   # gait abnormality to rule out Brain mets Vs stroke Vs medical induced neuropathy , doubt Elsberg syndrome   - Neuro consult appreciated   - CTH negative( 7/6)  - MRI brain w/wo contrast and MRI lumber spine w/wo contrast ( 7/7) negative brain/spine mets, no enhancement of thecal sac  - CK 77  - low clinical suspicion for paraneoplastic syndrome, f/u Myasthenia Gravis/Lambert-Eaton Myasthenic Syndrome Evaluation, Acetylcholine Modulating AB, Acetylcholine Blocking, LRP4 Autoantibody Test, Musk antibody, Paraneoplastic panel, ganglioside Ab  - Fall precautions   - PT eval reviewed, required 2 people assistance and unable to full stand as d/w daughterYoli whom now agreed to JEREMY placement     # Stage IIIc/T3N2/Mo triple negative IDC L breast ( TNBC)  - undergoing neoadjuvant chemo; CMF s/p 3 cycles ( 4/15, 5/13 and 6/3)    - Breast surgeon Dr. Mauri arroyo, Med Onc Dr. Poly Keith ( informed via emails)   - Palliative care consult appreciated for [facility]     # Alzheimer's dementia wo behavioral disturbance  # ? ME   - AAOx1  - SLP eval appreciated rec: puree w/ thin   - delirium precaution     # DVT ppx:  Lovenox     # Advance directives: DNR/DNI (MOLST form filled out) [facility] as d/w daughter/Yoli, no heroic measures i.e. CPR or intubation in an event required resuscitation. Informed Dr. Arroyo and Dr. Keith about admission     Contacts:  legal guardian( daughter): Bozena (Yoli) Tomasa [phone]  PMD Dr.Daniel Schumacher  Breast Surgeon: Dr.Paul Arroyo   Med Onc Dr. Poly Keith   Rad Onc Dr. Jesse Villeda     # Disposition: medically ready, plan for JEREMY placement as d/w Yoli and medical team      POC as d/w caregiver, 7UR2 team Dr.Grant Goode and Dr. Mo Keenan covering 7/10-7/20    Time-based billing (NON-critical care).     51 minutes spent on total encounter. The necessity of the time spent during the encounter on this date of service was due to:     Time spent on this encounter including   reviewed HIE for collaterals, chart, vitals,labs, imaging   interpretation and documentation  discussion with caregiver and housestaff   excluded teaching time, separately billed services.

## 2025-07-09 NOTE — PROGRESS NOTE ADULT - SUBJECTIVE AND OBJECTIVE BOX
INTERVAL/OVERNIGHT EVENTS: None    SUBJECTIVE:  Patient seen and examined at bedside. Pt is sleeping and appears comfortable. AOx1, which is baseline for the patients.She is following commands, no focal deficits.     Vital Signs Last 12 Hrs  T(F): 98.6 (07-06-25 @ 11:44), Max: 98.6 (07-06-25 @ 11:44)  HR: 92 (07-06-25 @ 11:44) (85 - 92)  BP: 138/66 (07-06-25 @ 11:44) (132/50 - 138/66)  BP(mean): 90 (07-06-25 @ 11:44) (78 - 90)  RR: 18 (07-06-25 @ 11:44) (18 - 18)  SpO2: 98% (07-06-25 @ 11:44) (95% - 98%)  I&O's Summary    05 Jul 2025 07:01  -  06 Jul 2025 07:00  --------------------------------------------------------  IN: 0 mL / OUT: 1350 mL / NET: -1350 mL    06 Jul 2025 07:01  -  06 Jul 2025 14:01  --------------------------------------------------------  IN: 0 mL / OUT: 400 mL / NET: -400 mL        PHYSICAL EXAM:  General: NAD  HEENT: sclera anicteric, MMM  Cardiovascular: normal s1/s2. RRR; no MRG;   Respiratory: CTA b/l; no w/r/r  GI/: soft; NTND; BS x4  Extremities: WWP; 2+ peripheral pulses bilaterally  Skin: normal color & turgor; no rash  Neurologic: aox1; no focal deficits    LABS:                        9.8    11.98 )-----------( 294      ( 06 Jul 2025 06:45 )             34.9     07-06    152[H]  |  123[H]  |  82[H]  ----------------------------<  160[H]  3.8   |  21[L]  |  1.10    Ca    8.0[L]      06 Jul 2025 06:45  Phos  2.6     07-06  Mg     2.3     07-06    TPro  6.9  /  Alb  2.3[L]  /  TBili  0.4  /  DBili  x   /  AST  20  /  ALT  12  /  AlkPhos  53  07-06    PT/INR - ( 05 Jul 2025 04:38 )   PT: 13.0 sec;   INR: 1.11          PTT - ( 05 Jul 2025 04:38 )  PTT:35.7 sec  Urinalysis Basic - ( 06 Jul 2025 06:45 )    Color: x / Appearance: x / SG: x / pH: x  Gluc: 160 mg/dL / Ketone: x  / Bili: x / Urobili: x   Blood: x / Protein: x / Nitrite: x   Leuk Esterase: x / RBC: x / WBC x   Sq Epi: x / Non Sq Epi: x / Bacteria: x          RADIOLOGY & ADDITIONAL TESTS:    MEDICATIONS  (STANDING):  dextrose 5%. 1000 milliLiter(s) (175 mL/Hr) IV Continuous <Continuous>  dextrose 5%. 1000 milliLiter(s) (100 mL/Hr) IV Continuous <Continuous>  dextrose 5%. 1000 milliLiter(s) (50 mL/Hr) IV Continuous <Continuous>  dextrose 50% Injectable 25 Gram(s) IV Push once  dextrose 50% Injectable 12.5 Gram(s) IV Push once  dextrose 50% Injectable 25 Gram(s) IV Push once  enoxaparin Injectable 40 milliGRAM(s) SubCutaneous every 24 hours  glucagon  Injectable 1 milliGRAM(s) IntraMuscular once  polyethylene glycol 3350 17 Gram(s) Oral every 12 hours  senna 2 Tablet(s) Oral at bedtime    MEDICATIONS  (PRN):  acetaminophen     Tablet .. 650 milliGRAM(s) Oral every 6 hours PRN Temp greater or equal to 38C (100.4F), Mild Pain (1 - 3)  aluminum hydroxide/magnesium hydroxide/simethicone Suspension 30 milliLiter(s) Oral every 4 hours PRN Dyspepsia  benzonatate 100 milliGRAM(s) Oral three times a day PRN Cough  dextrose Oral Gel 15 Gram(s) Oral once PRN Blood Glucose LESS THAN 70 milliGRAM(s)/deciliter  melatonin 3 milliGRAM(s) Oral at bedtime PRN Insomnia

## 2025-07-09 NOTE — PROGRESS NOTE ADULT - PROBLEM SELECTOR PLAN 5
sp neoadjuvant chemo; CMF s/p 3 cycles 4/15, 5/13 and 6/3 with functional decline, deconditioning, and declining nutritional status   - follows with Med Onc Dr. Poly Keith   - Ineligible for home hospice due to lack of detention care at home. Ineligible for hospice IPU due to lack of acuity; patient needs to be imminently dying, symptomatic, and requiring IV medications due to unreliable oral route

## 2025-07-09 NOTE — PROGRESS NOTE ADULT - PROBLEM SELECTOR PLAN 12
Plan: F: tolerating PO, no IVF  E: replete K<4, Mg<2  N: puree    VTE Prophylaxis: Lovenox  GI: miralax  C: DNR/DNI  D: RMF.

## 2025-07-09 NOTE — PROGRESS NOTE ADULT - ASSESSMENT
89y F with pmh Alzheimer's dementia, metastatic breast cancer, hypertension, stage 3a chronic kidney disease, hyperlipidemia, cerebral meningioma, hyponatremia, and vitamin D deficiency brought in by EMS for rectal bleeding. CTAP showed no GI bleeding. 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 Also with new weakness, CTH showed no acute intracranial pathology. Course complicated by hypernatremia to 163 which resolved with D5 now at 142. MRI brain showing no acute intracranial pathology, and no brain metastasis. MRI lumbar showed L4 - L5 stenosis with no metastasis,

## 2025-07-09 NOTE — DISCHARGE NOTE PROVIDER - NSDCFUSCHEDAPPT_GEN_ALL_CORE_FT
Poly Keith  [facility] PreAdmits  Scheduled Appointment: 07/15/2025    [facility] Physician [facility]  AMBCHEMO  [address]  Scheduled Appointment: 07/15/2025    [facility]  AMBCHEMO  [address]  Scheduled Appointment: 08/05/2025    [facility]  AMBCHEMO  [address]  Scheduled Appointment: 08/26/2025    [facility]  AMBCHEMO  [address]  Scheduled Appointment: 09/16/2025    [facility]  AMBCHEMO  [address]  Scheduled Appointment: 10/07/2025     [facility]  AMBCHEMO  [address]  Scheduled Appointment: 08/05/2025    [facility]  AMBCHEMO  [address]  Scheduled Appointment: 08/26/2025    [facility]  AMBCHEMO  [address]  Scheduled Appointment: 09/16/2025    [facility]  AMBCHEMO  [address]  Scheduled Appointment: 10/07/2025

## 2025-07-09 NOTE — DISCHARGE NOTE PROVIDER - NSDCMRMEDTOKEN_GEN_ALL_CORE_FT
atorvastatin 10 mg oral tablet: 1 tab(s) orally once a day (at bedtime)  Ginkgo Biloba oral tablet: orally once a day  valsartan 40 mg oral tablet: 1 tab(s) orally once a day  Vitamin D3:    ammonium lactate topical: Apply topically to affected area  atorvastatin 10 mg oral tablet: 1 tab(s) orally once a day (at bedtime)  Ginkgo Biloba oral tablet: orally once a day  valsartan 40 mg oral tablet: 1 tab(s) orally once a day  Vitamin D3:    ammonium lactate topical: Apply topically to affected area  atorvastatin 10 mg oral tablet: 1 tab(s) orally once a day (at bedtime)  cefTRIAXone 1 g injection: 1 gram(s) intravenously once a day  Ginkgo Biloba oral tablet: orally once a day  valsartan 40 mg oral tablet: 1 tab(s) orally once a day  Valtrex 500 mg oral tablet: 1 tab(s) orally 2 times a day  Vitamin D3:

## 2025-07-09 NOTE — DISCHARGE NOTE PROVIDER - HOSPITAL COURSE
PATIENT NAME is a 89 y/o female with a past medical history of Alzheimer's dementia, metastatic breast cancer, hypertension, stage 3a chronic kidney disease, hyperlipidemia, cerebral meningioma, hyponatremia, and vitamin D deficiency     Presented with bleeding, found to have bleeding sacral ulcer that is HSV positive    Hospital Course by Problem List/Main Diagnoses:  #    #Urinary retention.   Possible etiology: constipation, KUB today with moderate fecal material in colon, miralax and senna ordered    - Resolved    #Herpes simplex.   ·  Plan: Decubital ulcer PCR positive for HSV2  - continue valtrex 1g PO BID x 10 days.    #Sacral Ulcer  Patient's daughter started noticing blood in the stool for the past 4 days while changing her diapers. Initially, GI bleed was suspected, but CT angio of abdomen/pelvis was normal and patient's hgb was 12.1 but on examination patient has an oozing sacral decubitus ulcer on examination. Patient has been having difficulty walking due to leg swelling and has been bed bound recently.  Bleeding is likely 2/2 sacral decubitus ulcer.  HSV 2 positive    - Continue with wound care and valtrex    #Hypernatremia.   Plan: Patient has a history of CKD stage 3a and hyponatremia (possibly 2/2 SIADH per outpatient nephro note).  Sodium was 157 on admission, went up to 165 after the administration of 2L of NS.  Patient has 4.3L free water deficit  Renal consulted, given 165mL/hr D5  Na downtrending 144 > 142    - resolved.    #Acute kidney injury superimposed on CKD.   ·  Plan: Patient's daughter was told that the patient has CKD but patient never experienced any problems with urination and has appropriate urine output.  On admission: Cr 1.59, , eGFR 30  Now: Cr .87 BUN 34, eGFR 63    - RAFFI resolved.    #Breast cancer.   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 She was deemed as not a candidate for immunotherapy or surgery. She was started on chemotherapy and underwent 3 cycles, last one on 3-June 2025.  Per daughter, patient missed her cycle on Betina-15 due to difficulty ambulation.  She follows up with Dr. Poly Keith (med onc), Dr. Jesse Villeda (rad onc), and Dr. Mauri Arroyo (breast surgeon)    - F/u with Dr Keith and Dr Arroyo      Patient was discharged to: [facility]  New medications: Valtrex 1g PO BID  Changes to old medications: none  Medications that were stopped: none    Items to follow up as outpatient:    Physical exam at the time of discharge: PATIENT NAME is a 89 y/o female with a past medical history of Alzheimer's dementia, metastatic breast cancer, hypertension, stage 3a chronic kidney disease, hyperlipidemia, cerebral meningioma, hyponatremia, and vitamin D deficiency     Presented with bleeding, found to have bleeding sacral ulcer that is HSV positive    Hospital Course by Problem List/Main Diagnoses:    #Urinary retention.   Possible etiology: constipation, KUB today with moderate fecal material in colon, miralax and senna ordered  - Resolved    #Herpes simplex.   ·  Plan: Decubital ulcer PCR positive for HSV2  - continue valtrex 1g PO BID x 10 days.    #Sacral Ulcer  Patient's daughter started noticing blood in the stool for the past 4 days while changing her diapers. Initially, GI bleed was suspected, but CT angio of abdomen/pelvis was normal and patient's hgb was 12.1 but on examination patient has an oozing sacral decubitus ulcer on examination. Patient has been having difficulty walking due to leg swelling and has been bed bound recently.  Bleeding is likely 2/2 sacral decubitus ulcer.  HSV 2 positive    - Continue with wound care and valtrex    #Hypernatremia.   Plan: Patient has a history of CKD stage 3a and hyponatremia (possibly 2/2 SIADH per outpatient nephro note).  Sodium was 157 on admission, went up to 165 after the administration of 2L of NS.  Patient has 4.3L free water deficit  Renal consulted, given 165mL/hr D5  Na downtrending 144 > 142    - resolved.    #Acute kidney injury superimposed on CKD.   ·  Plan: Patient's daughter was told that the patient has CKD but patient never experienced any problems with urination and has appropriate urine output.  On admission: Cr 1.59, , eGFR 30  Now: Cr .87 BUN 34, eGFR 63    - RAFFI resolved.    #Breast cancer.   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 She was deemed as not a candidate for immunotherapy or surgery. She was started on chemotherapy and underwent 3 cycles, last one on 3-June 2025.  Per daughter, patient missed her cycle on Betina-15 due to difficulty ambulation.  She follows up with Dr. Poly Keith (med onc), Dr. Jesse Villeda (rad onc), and Dr. Mauri Arroyo (breast surgeon)    - F/u with Dr Keith and Dr Arroyo      Patient was discharged to: [facility]  New medications: Valtrex 1g PO BID  Changes to old medications: none  Medications that were stopped: none    Items to follow up as outpatient:    Physical exam at the time of discharge:   PATIENT NAME is a 89 y/o female with a past medical history of Alzheimer's dementia, metastatic breast cancer, hypertension, stage 3a chronic kidney disease, hyperlipidemia, cerebral meningioma, hyponatremia, and vitamin D deficiency     Presented with bleeding, found to have bleeding sacral ulcer that is HSV positive    Hospital Course by Problem List/Main Diagnoses:    #Herpes simplex.   ·  Plan: Decubital ulcer PCR positive for HSV2  - continue valtrex 1g PO BID x 10 days (7/7 - 7/16)   - Start Valtrex 500mg PO BID for HSV ppx indefinitely    #Sacral Ulcer  Patient's daughter started noticing blood in the stool for the past 4 days while changing her diapers. Initially, GI bleed was suspected, but CT angio of abdomen/pelvis was normal and patient's hgb was 12.1 but on examination patient has an oozing sacral decubitus ulcer on examination. Patient has been having difficulty walking due to leg swelling and has been bed bound recently.  Bleeding is likely 2/2 sacral decubitus ulcer.  HSV 2 positive    - Continue with wound care and valtrex as above    #Aspiration pneumonitis  #Concern for aspiration pneumonia  CXR with LLL opacity    - Continue ceftriaxone 2g IV BID x 4 days    #Urinary retention.   Possible etiology: constipation, KUB today with moderate fecal material in colon, miralax and senna ordered  - resolved  - s/p pinzon now on purewick catheter voiding well.    #Hypernatremia.   Plan: Patient has a history of CKD stage 3a and hyponatremia (possibly 2/2 SIADH per outpatient nephro note).  Sodium was 157 on admission, went up to 165 after the administration of 2L of NS.  Patient has 4.3L free water deficit  Renal consulted, given 165mL/hr D5  Na now 138    - resolved.    #Acute kidney injury superimposed on CKD.   ·  Plan: Patient's daughter was told that the patient has CKD but patient never experienced any problems with urination and has appropriate urine output.  On admission: Cr 1.59, , eGFR 30  Now: Cr .87 BUN 34, eGFR 63    - RAFFI resolved.    #Breast cancer.   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 She was deemed as not a candidate for immunotherapy or surgery. She was started on chemotherapy and underwent 3 cycles, last one on 3-June 2025.  Per daughter, patient missed her cycle on Betina-15 due to difficulty ambulation.  She follows up with Dr. Poly Keith (med onc), Dr. Jesse Villeda (rad onc), and Dr. Mauri Arroyo (breast surgeon)    - F/u with Dr Keith and Dr Arroyo      Patient was discharged to: [facility]  New medications: Valtrex 500mg PO BID for HSV ppx indefinitely, ceftriaxone 2g BID x 4 days IV  Changes to old medications: none  Medications that were stopped: none    Items to follow up as outpatient: HSV, sacral ulcer, breast cancer    Physical exam at the time of discharge:  Constitutional: NAD, comfortable in bed.  Respiratory: CTA B/L. No w/r/r.   Cardiovascular: RRR, normal S1 and S2, no m/r/g.   Gastrointestinal: soft NTND, no palpable masses   Extremities: wwp; no cyanosis, clubbing or edema.   Vascular: Pulses equal and strong throughout.   Neurological: no obvious CN deficits  Skin: No gross skin abnormalities or rashes   PATIENT NAME is a 89 y/o female with a past medical history of Alzheimer's dementia, metastatic breast cancer, hypertension, stage 3a chronic kidney disease, hyperlipidemia, cerebral meningioma, hyponatremia, and vitamin D deficiency     Presented with bleeding, found to have bleeding sacral ulcer that is HSV positive    Hospital Course by Problem List/Main Diagnoses:    #Herpes simplex.   ·  Plan: Decubital ulcer PCR positive for HSV2  - continue valtrex 1g PO BID x 10 days (7/7 - 7/16)   - Start Valtrex 500mg PO BID for HSV ppx indefinitely    #Sacral Ulcer  Patient's daughter started noticing blood in the stool for the past 4 days while changing her diapers. Initially, GI bleed was suspected, but CT angio of abdomen/pelvis was normal and patient's hgb was 12.1 but on examination patient has an oozing sacral decubitus ulcer on examination. Patient has been having difficulty walking due to leg swelling and has been bed bound recently.  Bleeding is likely 2/2 sacral decubitus ulcer.  HSV 2 positive    - Continue with wound care and valtrex as above    #Aspiration pneumonitis  #Concern for aspiration pneumonia  CXR with LLL opacity    - Continue ceftriaxone 1g IV QD x 4 days    #Urinary retention.   Possible etiology: constipation, KUB today with moderate fecal material in colon, miralax and senna ordered  - resolved  - s/p pinzon now on purewick catheter voiding well.    #Hypernatremia.   Plan: Patient has a history of CKD stage 3a and hyponatremia (possibly 2/2 SIADH per outpatient nephro note).  Sodium was 157 on admission, went up to 165 after the administration of 2L of NS.  Patient has 4.3L free water deficit  Renal consulted, given 165mL/hr D5  Na now 138    - resolved.    #Acute kidney injury superimposed on CKD.   ·  Plan: Patient's daughter was told that the patient has CKD but patient never experienced any problems with urination and has appropriate urine output.  On admission: Cr 1.59, , eGFR 30  Now: Cr .87 BUN 34, eGFR 63    - RAFFI resolved.    #Breast cancer.   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 She was deemed as not a candidate for immunotherapy or surgery. She was started on chemotherapy and underwent 3 cycles, last one on 3-June 2025.  Per daughter, patient missed her cycle on Betina-15 due to difficulty ambulation.  She follows up with Dr. Poly Keith (med onc), Dr. Jesse Villeda (rad onc), and Dr. Mauri Arroyo (breast surgeon)    - F/u with Dr Keith and Dr Arroyo      Patient was discharged to: [facility]  New medications: Valtrex 500mg PO BID for HSV ppx indefinitely, ceftriaxone 1g QD x 4 days IV  Changes to old medications: none  Medications that were stopped: none    Items to follow up as outpatient: HSV, sacral ulcer, breast cancer    Physical exam at the time of discharge:  Constitutional: NAD, comfortable in bed.  Respiratory: CTA B/L. No w/r/r.   Cardiovascular: RRR, normal S1 and S2, no m/r/g.   Gastrointestinal: soft NTND, no palpable masses   Extremities: wwp; no cyanosis, clubbing or edema.   Vascular: Pulses equal and strong throughout.   Neurological: no obvious CN deficits  Skin: No gross skin abnormalities or rashes

## 2025-07-09 NOTE — PROGRESS NOTE ADULT - PROBLEM SELECTOR PLAN 3
- Common complication in immobile patients, highlighting decreased mobility and increased care needs.  - Ulcer(s) pose risks of infection, pain, and further functional decline, compounding the challenges of advanced disease and often reflects an overall deterioration in health and a trajectory towards end-of-life.  - cw supportive care, low threshold to schedule tylenol 650 mg PO q12 standing given patient's limited ability to use call bell in setting of dementia

## 2025-07-09 NOTE — DISCHARGE NOTE PROVIDER - ATTENDING ATTESTATION STATEMENT
Induction
I have personally seen and examined the patient. I have collaborated with and supervised the

## 2025-07-09 NOTE — PROGRESS NOTE ADULT - PROBLEM SELECTOR PLAN 3
Decubital ulcer PCR positive for HSV2  - Start valtrex 1g PO BID x 10 days Decubital ulcer PCR positive for HSV2  - continue valtrex 1g PO BID x 10 days

## 2025-07-09 NOTE — DISCHARGE NOTE PROVIDER - NSDCFUADDAPPT_GEN_ALL_CORE_FT
f/u with pcp 1-2 weeks after discharge f/u with pcp 1-2 weeks after discharge as well as Dr Keith and Dr Arroyo

## 2025-07-09 NOTE — PROGRESS NOTE ADULT - ASSESSMENT
89F Alzheimer's dementia,  CKD, and stage IIIc triple-negative left breast cancer (s/p 3 cycles of neoadjuvant chemo) pw bleeding sacral ulcer, hyponatremia/hypernatremia, RAFFI, urinary retention, and functional decline. Palliative consulted to discuss goals of care and treatment preferences in the setting of advanced illness.    GOC 7/7.  Patient unlikely to return to a state where will safely tolerate further DMTx eg systemic chemo. Introduced home hospice; however, given patient's deconditioning/debility and lack of adequate long-term care at home, Melodie electing to dc to [facility].

## 2025-07-09 NOTE — PROGRESS NOTE ADULT - SUBJECTIVE AND OBJECTIVE BOX
[facility]iatric[facility] and Palliative [facility]  Christine Santacruz Geriatrics & Palliative Care NP  Contact Info: Call [phone] (HEAL Line) or message on [employer] Teams    SUBJECTIVE/OBJECTIVE: Interval events noted. See patient's PRN use for the past 24hrs noted below.   Unable to obtain ROS from patient due to baseline cognitive impairment from dementia.     ALLERGIES: No Known Allergies    MEDICATIONS: REVIEWED  MEDICATIONS  (STANDING):  dextrose 5%. 1000 milliLiter(s) (100 mL/Hr) IV Continuous <Continuous>  dextrose 5%. 1000 milliLiter(s) (50 mL/Hr) IV Continuous <Continuous>  dextrose 50% Injectable 25 Gram(s) IV Push once  dextrose 50% Injectable 12.5 Gram(s) IV Push once  dextrose 50% Injectable 25 Gram(s) IV Push once  enoxaparin Injectable 40 milliGRAM(s) SubCutaneous every 24 hours  glucagon  Injectable 1 milliGRAM(s) IntraMuscular once  multivitamin 1 Tablet(s) Oral daily  polyethylene glycol 3350 17 Gram(s) Oral every 12 hours  senna 2 Tablet(s) Oral at bedtime  valACYclovir 1000 milliGRAM(s) Oral two times a day    MEDICATIONS  (PRN):  acetaminophen     Tablet .. 650 milliGRAM(s) Oral every 6 hours PRN Temp greater or equal to 38C (100.4F), Mild Pain (1 - 3)  aluminum hydroxide/magnesium hydroxide/simethicone Suspension 30 milliLiter(s) Oral every 4 hours PRN Dyspepsia  benzonatate 100 milliGRAM(s) Oral three times a day PRN Cough  dextrose Oral Gel 15 Gram(s) Oral once PRN Blood Glucose LESS THAN 70 milliGRAM(s)/deciliter  melatonin 3 milliGRAM(s) Oral at bedtime PRN Insomnia    Analgesic Use (Scheduled and PRNs) for past 24 hours (6a-6a):          PRESENT SYMPTOMS:   [x ] Patient unable to self report   Source if other than patient:  [x ]Family   [x ]Team     Pain [  ]  PAIN AD Score: 0  [email or web address] (press ctrl +  left click to view)    If [  ], pt denies symptom.   Dyspnea:         [  ]  Anxiety:           [  ]  Difficulty sleeping: [  ]  Fatigue:           [ x ]  Nausea:           [  ]  Loss of appetite:     [  ]  Dysphagia: [  ]  Constipation:   [  ]        Other Symptoms:    All other review of systems negative [x  ]    ECOG Performance:     3  Current Palliative Performance Scale/Karnofsky Score:  40  %  Preadmit Karnofsky:  50  %          PEx:  General: tired appearing older woman sitting up in bed, NAD  oriented x    1   lethargic, minimally verbal  NEURO:  +cognitive impairment,+ encephalopathic No dsyphagia dysarthria paresis  Behavioral: Withdrawn   HEENT:  No temporal wasting,  No dry mouth  RESP: Reg rhythm, No  tachypnea/labored breathing,  CTAB, diminished bilat bases  CV:  No  tachycardia  GI: soft non distended non tender    MUSK: weakness x4, No edema,  non ambulatory   SKIN:  Poor skin turgor, Pallor, Pressure ulcer stage: multiple pressure wounds, DTE see primary team comprehensive assessment; skin assessment deferred for patient comfort     T(C): 36.8 (07-09-25 @ 12:10), Max: 37.1 (07-08-25 @ 20:40)  HR: 90 (07-09-25 @ 12:10) (90 - 99)  BP: 114/67 (07-09-25 @ 12:10) (113/57 - 126/71)  RR: 18 (07-09-25 @ 12:10) (17 - 18)  SpO2: 96% (07-09-25 @ 12:10) (96% - 97%)  Wt(kg): --      LABS: REVIEWED  CBC:                        9.5    7.60  )-----------( 267      ( 09 Jul 2025 05:30 )             32.7     CMP:    07-09    143  |  113[H]  |  27[H]  ----------------------------<  99  4.3   |  19[L]  |  0.95    Ca    8.0[L]      09 Jul 2025 05:30  Phos  3.1     07-09  Mg     2.1     07-09    TPro  7.2  /  Alb  2.4[L]  /  TBili  0.3  /  DBili  x   /  AST  31  /  ALT  18  /  AlkPhos  66  07-09      RADIOLOGY & ADDITIONAL STUDIES: No new    DISCUSSION OF CASE: Family - to provide updates and emotional support; Primary Team and RN - to discuss plan of care    CARE COORDINATION:     PROGRESS NOTE  Date & Time of Note   2025-07-08 16:21    Notes    Notes: HASMUKH met with pt's daughter who met with Dr. Geronimo to discuss d/c option.   Per the daughter, she is now open to pt's going to short term rehab with  eventual d/c to home with hospital bed.  Pt's daughter, Bozena expressed that  she would like pt go to [facility] and [facility] as their  choice.  Pt referred to multiple facilities in [place].  Pt did not get  accepted to [facility] and [facility].  Pt accepted to only 1  facility, [facility].  Pt's daughter met with [facility] for Q&A.      Pt's daughter accepted the bed offer at [facility] and tasked DPC to  initiate insurance authorization.      HASMUKH will continue to follow pt's insurance auth to go [facility].       Electronically signed by:  Marianela Levin  Electronically signed on:  2025-07-08  16:26

## 2025-07-09 NOTE — PROGRESS NOTE ADULT - PROBLEM SELECTOR PLAN 2
- Hernández in place, will attempt TOV  - possible etiology: constipation, KUB today with moderate fecal material in colon, miralax and senna ordered - possible etiology: constipation, KUB today with moderate fecal material in colon, miralax and senna ordered  - Patient urinating without cath

## 2025-07-09 NOTE — PROGRESS NOTE ADULT - PROBLEM SELECTOR PLAN 7
- DNR DNI  - daughter Amara would be surrogate decision maker in the absence of a documented HCP  - see GOC    - Emotional support provided, questions answered.   Complex medical decision making / symptom management in the setting of     - Palliative medicine will sign off.  Please contact Palliative Medicine 24/7 at [phone] if the patient's symptoms and/or goals of care change, need to be readdressed, or if patient is readmitted in the future.     Active Psychosocial Referrals:  [x]Social Work/Case management []PT/OT [ ]Chaplaincy [ ]Hospice  []Patient/Family Support []Holistic RN [x]Massage Therapy [x]Music Therapy []Ethics  Coping: [] well [x] with difficulty [ ] poor coping [] unable to assess  Support system: [] strong [x] adequate [ ] inadequate

## 2025-07-09 NOTE — PROGRESS NOTE ADULT - SUBJECTIVE AND OBJECTIVE BOX
Patient is a 89y old  Female who presents with a chief complaint of Rectal bleeding (08 Jul 2025 19:16)    INTERVAL EVENTS: passed TOV on primafit now     SUBJECTIVE:  Patient was seen and examined at bedside. good spirits, denies pain    Review of systems: No fever, chills, dizziness, HA, Changes in vision, CP, dyspnea, nausea or vomiting, dysuria, changes in bowel movements, LE edema. Rest of 12 point Review of systems negative unless otherwise documented elsewhere in note.     Diet, Pureed (07-05-25 @ 15:35) [Active]      MEDICATIONS:  MEDICATIONS  (STANDING):  dextrose 5%. 1000 milliLiter(s) (100 mL/Hr) IV Continuous <Continuous>  dextrose 5%. 1000 milliLiter(s) (50 mL/Hr) IV Continuous <Continuous>  dextrose 50% Injectable 25 Gram(s) IV Push once  dextrose 50% Injectable 12.5 Gram(s) IV Push once  dextrose 50% Injectable 25 Gram(s) IV Push once  enoxaparin Injectable 40 milliGRAM(s) SubCutaneous every 24 hours  glucagon  Injectable 1 milliGRAM(s) IntraMuscular once  multivitamin 1 Tablet(s) Oral daily  polyethylene glycol 3350 17 Gram(s) Oral every 12 hours  senna 2 Tablet(s) Oral at bedtime  valACYclovir 1000 milliGRAM(s) Oral two times a day    MEDICATIONS  (PRN):  acetaminophen     Tablet .. 650 milliGRAM(s) Oral every 6 hours PRN Temp greater or equal to 38C (100.4F), Mild Pain (1 - 3)  aluminum hydroxide/magnesium hydroxide/simethicone Suspension 30 milliLiter(s) Oral every 4 hours PRN Dyspepsia  benzonatate 100 milliGRAM(s) Oral three times a day PRN Cough  dextrose Oral Gel 15 Gram(s) Oral once PRN Blood Glucose LESS THAN 70 milliGRAM(s)/deciliter  melatonin 3 milliGRAM(s) Oral at bedtime PRN Insomnia      Allergies    No Known Allergies    Intolerances        OBJECTIVE:  Vital Signs Last 24 Hrs  T(C): 36.8 (09 Jul 2025 06:20), Max: 37.1 (08 Jul 2025 20:40)  T(F): 98.2 (09 Jul 2025 06:20), Max: 98.7 (08 Jul 2025 20:40)  HR: 99 (09 Jul 2025 06:20) (91 - 99)  BP: 113/57 (09 Jul 2025 06:20) (107/58 - 126/71)  BP(mean): 76 (09 Jul 2025 06:20) (76 - 76)  RR: 17 (09 Jul 2025 06:20) (17 - 18)  SpO2: 96% (09 Jul 2025 06:20) (96% - 97%)    Parameters below as of 09 Jul 2025 06:20  Patient On (Oxygen Delivery Method): room air      I&O's Summary    08 Jul 2025 07:01  -  09 Jul 2025 07:00  --------------------------------------------------------  IN: 0 mL / OUT: 300 mL / NET: -300 mL        PHYSICAL EXAM:  Gen: Reclining in bed at time of exam, appears stated age  HEENT: NCAT, MMM, clear OP  Neck: supple, trachea at midline  CV: RRR, +S1/S2  Pulm: adequate respiratory effort, no increase in work of breathing  Abd: soft, NTND  Skin: warm and dry,   Ext: WWP, no LE edema  Neuro: AOx3, no gross focal neurological deficits  Psych: affect and behavior appropriate, pleasant at time of interview  :     LABS:                        9.5    7.60  )-----------( 267      ( 09 Jul 2025 05:30 )             32.7     07-09    143  |  113[H]  |  27[H]  ----------------------------<  99  4.3   |  19[L]  |  0.95    Ca    8.0[L]      09 Jul 2025 05:30  Phos  3.1     07-09  Mg     2.1     07-09    TPro  7.2  /  Alb  2.4[L]  /  TBili  0.3  /  DBili  x   /  AST  31  /  ALT  18  /  AlkPhos  66  07-09    LIVER FUNCTIONS - ( 09 Jul 2025 05:30 )  Alb: 2.4 g/dL / Pro: 7.2 g/dL / ALK PHOS: 66 U/L / ALT: 18 U/L / AST: 31 U/L / GGT: x             CAPILLARY BLOOD GLUCOSE      POCT Blood Glucose.: 108 mg/dL (09 Jul 2025 09:36)    Urinalysis Basic - ( 09 Jul 2025 05:30 )    Color: x / Appearance: x / SG: x / pH: x  Gluc: 99 mg/dL / Ketone: x  / Bili: x / Urobili: x   Blood: x / Protein: x / Nitrite: x   Leuk Esterase: x / RBC: x / WBC x   Sq Epi: x / Non Sq Epi: x / Bacteria: x        MICRODATA:      RADIOLOGY/OTHER STUDIES:    PCP  Pharmacy:   Emergency contact:

## 2025-07-10 LAB
ACHR BLOCK AB SER-ACNC: 22 % — SIGNIFICANT CHANGE UP (ref 0–25)
ACHR MOD AB SER-ACNC: 10 % — SIGNIFICANT CHANGE UP (ref 0–45)
CULTURE RESULTS: SIGNIFICANT CHANGE UP
CULTURE RESULTS: SIGNIFICANT CHANGE UP
SPECIMEN SOURCE: SIGNIFICANT CHANGE UP
SPECIMEN SOURCE: SIGNIFICANT CHANGE UP

## 2025-07-10 PROCEDURE — 99232 SBSQ HOSP IP/OBS MODERATE 35: CPT

## 2025-07-10 RX ORDER — NYSTATIN 100000 [USP'U]/G
1 CREAM TOPICAL EVERY 12 HOURS
Refills: 0 | Status: DISCONTINUED | OUTPATIENT
Start: 2025-07-10 | End: 2025-07-16

## 2025-07-10 RX ADMIN — POLYETHYLENE GLYCOL 3350 17 GRAM(S): 17 POWDER, FOR SOLUTION ORAL at 05:44

## 2025-07-10 RX ADMIN — POLYETHYLENE GLYCOL 3350 17 GRAM(S): 17 POWDER, FOR SOLUTION ORAL at 17:49

## 2025-07-10 RX ADMIN — Medication 1 TABLET(S): at 11:31

## 2025-07-10 RX ADMIN — ENOXAPARIN SODIUM 40 MILLIGRAM(S): 100 INJECTION SUBCUTANEOUS at 15:17

## 2025-07-10 RX ADMIN — Medication 1000 MILLIGRAM(S): at 17:49

## 2025-07-10 RX ADMIN — Medication 1000 MILLIGRAM(S): at 05:44

## 2025-07-10 RX ADMIN — Medication 2 TABLET(S): at 21:44

## 2025-07-10 RX ADMIN — NYSTATIN 1 APPLICATION(S): 100000 CREAM TOPICAL at 17:50

## 2025-07-10 NOTE — PROGRESS NOTE ADULT - PROBLEM SELECTOR PLAN 2
- Hernández in place, will attempt TOV  - possible etiology: constipation, KUB today with moderate fecal material in colon, miralax and senna ordered - TOV passed, no catheter  - possible etiology: constipation, KUB today with moderate fecal material in colon, miralax and senna ordered

## 2025-07-10 NOTE — PROGRESS NOTE ADULT - ASSESSMENT
I M    89y F with pmh Alzheimer's dementia, metastatic breast cancer, hypertension, stage 3a chronic kidney disease, hyperlipidemia, cerebral meningioma, hyponatremia, and vitamin D deficiency brought in by EMS for rectal bleeding. CTAP showed no GI bleeding. 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 Also with new weakness, CTH showed no acute intracranial pathology. Course complicated by hypernatremia to 163 which resolved with D5 now at 142. MRI brain showing no acute intracranial pathology, and no brain metastasis. MRI lumbar showed L4 - L5 stenosis with no metastasis,          Problem/Plan - 1:  ·  Problem: Weakness.   ·  Plan: - new onset subjective weakness   - non-focal, neuro following   - MRI with/without contrast brain and L spine showing no acute intracranial pathology and no mets to brain or spine. Spine showing stenosis L4 - L5  -  Awaiting CK, Myasthenia Gravis/Lambert-Eaton Myasthenic Syndrome Evaluation, Acetylcholine Modulating AB, Acetylcholine Blocking, LRP4 Autoantibody Test, Musk antibody, Paraneoplastic panel, ganglioside Ab ordered, PND   - NIF/VC ordered PND.    Problem/Plan - 2:  ·  Problem: Urinary retention.   ·  Plan: - Hernández in place, will attempt TOV  - possible etiology: constipation, KUB today with moderate fecal material in colon, miralax and senna ordered.    Problem/Plan - 3:  ·  Problem: Herpes simplex.   ·  Plan: Decubital ulcer PCR positive for HSV2  - Start valtrex 1g PO BID x 10 days.    Problem/Plan - 4:  ·  Problem: Sacral ulcer.   ·  Plan: Patient's daughter started noticing blood in the stool for the past 4 days while changing her diapers. Initially, GI bleed was suspected, but CT angio of abdomen/pelvis was normal and patient's hgb was 12.1 but on examination patient has an oozing sacral decubitus ulcer on examination. Patient has been having difficulty walking due to leg swelling and has been bed bound recently.  Bleeding is likely 2/2 sacral decubitus ulcer.  HSV 2 positive    - local wound care w/ foam dressing.    Problem/Plan - 5:  ·  Problem: Hypernatremia.   ·  Plan: Patient has a history of CKD stage 3a and hyponatremia (possibly 2/2 SIADH per outpatient nephro note).  Sodium was 157 on admission, went up to 165 after the administration of 2L of NS.  Patient has 4.3L free water deficit  Renal consulted, given 165mL/hr D5  Na downtrending 144 > 142    - resolved.    Problem/Plan - 6:  ·  Problem: Acute kidney injury superimposed on CKD.   ·  Plan: Patient's daughter was told that the patient has CKD but patient never experienced any problems with urination and has appropriate urine output.  On admission: Cr 1.59, , eGFR 30  Now: Cr .87 BUN 34, eGFR 63    - RAFFI resolved.    Problem/Plan - 7:  ·  Problem: Breast cancer.   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 She was deemed as not a candidate for immunotherapy or surgery. She was started on chemotherapy and underwent 3 cycles, last one on 3-June 2025.  Per daughter, patient missed her cycle on Betina-15 due to difficulty ambulation.  She follows up with Dr. Poly Keith (med onc), Dr. Jesse Villeda (rad onc), and Dr. Mauri Arroyo (breast surgeon)    - Palliative consulted, f/u recs  - Dr. Keith and Dr. Arroyo were notified via email.    Problem/Plan - 8:  ·  Problem: Chronic hyponatremia.   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    - CTM.    Problem/Plan - 9:  ·  Problem: Dementia.   ·  Plan: Patient has a history of dementia. A&Ox1 to person only, but daughter states that this is the baseline and denies any deterioration and confusion.    - CTM.    Problem/Plan - 10:  ·  Problem: History of cerebral meningioma.   ·  Plan; Daughter reports the patient had a brain mass that was removed in 2015.    Problem/Plan - 11:  ·  Problem: Hypertension.   ·  Plan: Patient has a history of left frontal meningioma that was resected in 2015    Patient has a history of hypertension. She was prescribed valsartan 40mg per surescripts but hasn't been taking it for weeks per daughter.  Blood pressure on admission was 90/61 went up to 137/58 after fluid bolus in the ED.    - hold home valsartan    HLD  Patient has a history of hyperlipidemia. She was prescribed atorvastatin 10mg per surescripts  but hasn't been taking it for weeks per daughter.    - hold home meds.    Problem/Plan - 12:  ·  Problem: Prophylactic measure.   ·  Plan: Plan: F: tolerating PO, no IVF  E: replete K<4, Mg<2  N: puree    VTE Prophylaxis: Lovenox  GI: miralax  C: DNR/DNI  D: RMF.

## 2025-07-10 NOTE — ADVANCED PRACTICE NURSE CONSULT - RECOMMEDATIONS
Nystatin powder to pannus, antifungal moisture barrier ointment to bilat posterior thighs, buttocks, coccyx wound, do not cover. Triad ointment to sites on right lateral malleolus and left heel. Spoke with Allesandra and house staff. Total time spent on encounter=65 minutes

## 2025-07-10 NOTE — PROGRESS NOTE ADULT - ATTENDING COMMENTS
89y F Shanghainese/Cantonese/limited English, b/l  hearing impairment (better hearing on left ear), with PMHx of Alzheimer's dementia without behavioral disturbance, AAOx1, partial ADL's, gait disturbance (RW assist, was able to walk 3 weeks prior to admission now bed bound for 5 days),hx left frontal craniotomy for cerebral meningioma ( 2015), Obesity (BMI 29.7), HTN, HLD, CKD3a, hx colonic polyps, moderate internal hemorrhoid and rectal prolapse (2021), hx hyponatremia 2/2 SIADH, recently diagnosed stage IIIc (T3N2Mo) triple negative ( ER-/GA-/HER2-) intraductal carcinoma( IDC) of left breast ~ 4.7cm ( 2/2025) followed by Dr.Paul Arroyo/Dr.Bonnie Keith receiving neoadjuvant chemoRx w/ CMF ( cyclophosphamide/methotrexate/5-FU started 4/15/25, s/p cycle 3 on 6/3 out of 8 cycles) now BIBEMS for rectal bleeding likely 2/2 sacral ulcer (? herpetic looking) and found to have ME/FTT with hyponatremia, RAFFI on CKD3a and functional quadriplegia Vs gait abnormality /bed bound for 5 days.     pt seen awake, answering questions  not able to relate history  appear comfortable and moving all limbs.    # rectal bleed likely 2/2 sacral ulcer ( HSV detected)   - MRI lumber spine showed no enhancement of thecal sac, pt is clinically better from ME/corrected hypernatremia and RAFFI  - ID consult appreciated  Dr. Dorsey, agreed to d/c acyclovir and resume valtrex 1g PO bid for total 7 days for HSV sacral ulcer   -  Valtrex 1g PO bid for 10 days ( 7/7-7/16) then reduced to 500mg po bid for HSV ppx if continuing on chemoRx   - Neuro f/u appreicated   - local wound care w/ foam dressing    # Hx Hyponatremia/SIADH  # Hypernatremia ( corrected) -most recent sodium 143   # RAFFI corrected ( likely 2/2 prerenal azotemia Vs ischemic ATN) on CKD3a  # acute urinary retention likely 2/2 constipation( moderate stool burden seen on CT)   - Renal f/u appreciated  sp pinzon -now on purewick catheter.   - bowel regimen for constipation; c/w Senna 2 tabs qHS and miralax 17g PO daily ok  # FTT likely related to chemoRx   # Protein Calorie malnutrition (albumin 2.6-2.7)   - Nutrition consult   - Ensure max protein (Vanilla) bid     # Deconditioning   # Functional quadriplegia   # gait abnormality to rule out Brain mets Vs stroke Vs medical induced neuropathy , doubt Elsberg syndrome   - Neuro consult appreciated   - CTH negative( 7/6)  - MRI brain w/wo contrast and MRI lumber spine w/wo contrast ( 7/7) negative brain/spine mets, no enhancement of thecal sac  - CK 77  - low clinical suspicion for paraneoplastic syndrome, f/u Myasthenia Gravis/Lambert-Eaton Myasthenic Syndrome Evaluation, Acetylcholine Modulating AB, Acetylcholine Blocking, LRP4 Autoantibody Test, Musk antibody, Paraneoplastic panel, ganglioside Ab  - Fall precautions   - PT eval reviewed, required 2 people assistance and unable to full stand  daughterYoli whom now agreed to JEREMY placement     # Stage IIIc/T3N2/Mo triple negative IDC L breast ( TNBC)  - undergoing neoadjuvant chemo; CMF s/p 3 cycles ( 4/15, 5/13 and 6/3)    - Breast surgeon Dr. Mauri arroyo, Med Onc Dr. Poly Kieth  - Palliative care consult appreciated for GOC     # Alzheimer's dementia wo behavioral disturbance  - AAOx1  - SLP eval appreciated rec: puree w/ thin   - delirium precaution     # DVT ppx:  Lovenox     # Advance directives: DNR/DNI (MOLST form filled out) [facility]  no heroic measures i.e. CPR or intubation in an event required resuscitation. Informed Dr. Arroyo and Dr. Keith about admission     Contacts:  legal guardian( daughter): Bozena (Yoli) Tomasa [phone]  PMD Dr.Daniel Schumacher  Breast Surgeon: Dr.Paul Arroyo   Med Onc Dr. Poly Keith   Rad Onc Dr. Jesse Villeda     # Disposition: medically ready, plan for JEREMY placement 89y F Shanghainese/Cantonese/limited English, b/l  hearing impairment (better hearing on left ear), with PMHx of Alzheimer's dementia without behavioral disturbance, AAOx1, partial ADL's, gait disturbance (RW assist, was able to walk 3 weeks prior to admission now bed bound for 5 days),hx left frontal craniotomy for cerebral meningioma ( 2015), Obesity (BMI 29.7), HTN, HLD, CKD3a, hx colonic polyps, moderate internal hemorrhoid and rectal prolapse (2021), hx hyponatremia 2/2 SIADH, recently diagnosed stage IIIc (T3N2Mo) triple negative ( ER-/MT-/HER2-) intraductal carcinoma( IDC) of left breast ~ 4.7cm ( 2/2025) followed by Dr.Paul Arroyo/Dr.Bonnie Keith receiving neoadjuvant chemoRx w/ CMF ( cyclophosphamide/methotrexate/5-FU started 4/15/25, s/p cycle 3 on 6/3 out of 8 cycles) now BIBEMS for rectal bleeding likely 2/2 sacral ulcer (? herpetic looking) and found to have ME/FTT with hyponatremia, RAFFI on CKD3a and functional quadriplegia Vs gait abnormality /bed bound for 5 days.     pt seen awake, answering questions  not able to relate history  appear comfortable and moving all limbs.    # rectal bleed likely 2/2 sacral ulcer ( HSV detected)   - MRI lumber spine showed no enhancement of thecal sac, pt is clinically better from ME/corrected hypernatremia and RAFFI  - ID consult appreciated  Dr. Dorsey, agreed to d/c acyclovir and resume valtrex 1g PO bid for total 7 days for HSV sacral ulcer   -  Valtrex 1g PO bid for 10 days ( 7/7-7/16) then reduced to 500mg po bid for HSV ppx if continuing on chemoRx   - Neuro f/u appreicated   - local wound care w/ foam dressing    # Hx Hyponatremia/SIADH  # Hypernatremia ( corrected) -most recent sodium 143   # RAFFI corrected ( likely 2/2 prerenal azotemia Vs ischemic ATN) on CKD3a  # acute urinary retention likely 2/2 constipation( moderate stool burden seen on CT)   - Renal f/u appreciated  sp pinzon -now on purewick catheter.   - bowel regimen for constipation; c/w Senna 2 tabs qHS and miralax 17g PO daily ok  # FTT likely related to chemoRx   # Protein Calorie malnutrition (albumin 2.6-2.7)   - Nutrition consult   - Ensure max protein (Vanilla) bid     # Deconditioning   # Functional quadriplegia   # gait abnormality to rule out Brain mets Vs stroke Vs medical induced neuropathy , doubt Elsberg syndrome   - Neuro consult appreciated   - CTH negative( 7/6)  - MRI brain w/wo contrast and MRI lumber spine w/wo contrast ( 7/7) negative brain/spine mets, no enhancement of thecal sac  - CK 77  - fu on the extensive labs sent for weakness --   - Fall precautions   - PT eval reviewed, required 2 people assistance and unable to full stand  daughter, Yoli whom now agreed to JEREMY placement     # Stage IIIc/T3N2/Mo triple negative IDC L breast ( TNBC)  - undergoing neoadjuvant chemo; CMF s/p 3 cycles ( 4/15, 5/13 and 6/3)    - Breast surgeon Dr. Mauri arroyo, Med Onc Dr. Poly Keith  - Palliative care consult appreciated for GO     # Alzheimer's dementia wo behavioral disturbance  - AAOx1  - SLP eval appreciated rec: puree w/ thin   - delirium precaution     # DVT ppx:  Lovenox     # Advance directives: DNR/DNI (MOLST form filled out) [facility]  no heroic measures i.e. CPR or intubation in an event required resuscitation. Informed Dr. Arroyo and Dr. Keith about admission     Contacts:  legal guardian( daughter): Bozena (Yoli) Tomasa [phone]  PMD Dr.Daniel Schumacher  Breast Surgeon: Dr.Paul Arroyo   Med Onc Dr. Poly Keith   Rad Onc Dr. Jesse Villeda     # Disposition: medically ready, plan for JEREMY placement

## 2025-07-10 NOTE — PROGRESS NOTE ADULT - PROBLEM SELECTOR PLAN 1
- new onset subjective weakness   - non-focal, neuro following   - MRI with/without contrast brain and L spine showing no acute intracranial pathology and no mets to brain or spine. Spine showing stenosis L4 - L5  -  Awaiting CK, Myasthenia Gravis/Lambert-Eaton Myasthenic Syndrome Evaluation, Acetylcholine Modulating AB, Acetylcholine Blocking, LRP4 Autoantibody Test, Musk antibody, Paraneoplastic panel, ganglioside Ab ordered, PND   - NIF/VC ordered PND - new onset subjective weakness   - non-focal, neuro following   - MRI with/without contrast brain and L spine showing no acute intracranial pathology and no mets to brain or spine. Spine showing stenosis L4 - L5  -  Awaiting CK, Myasthenia Gravis/Lambert-Eaton Myasthenic Syndrome Evaluation, Acetylcholine Modulating AB, Acetylcholine Blocking, LRP4 Autoantibody Test, Musk antibody, Paraneoplastic panel, ganglioside Ab ordered, PND   - NIF/VC ordered PND   -Pt signed off by neuro - new onset subjective weakness   - non-focal, neuro following   - MRI with/without contrast brain and L spine showing no acute intracranial pathology and no mets to brain or spine. Spine showing stenosis L4 - L5  - NIF/VC ordered PND  - CK, Myasthenia Gravis/Lambert-Eaton Myasthenic Syndrome Evaluation, Acetylcholine Modulating AB, Acetylcholine Blocking, LRP4 Autoantibody Test, Musk antibody, Paraneoplastic panel, ganglioside Ab ordered, PND to be done outpatient as per neuro

## 2025-07-10 NOTE — PROGRESS NOTE ADULT - ASSESSMENT
89y F with pmh Alzheimer's dementia, metastatic breast cancer, hypertension, stage 3a chronic kidney disease, hyperlipidemia, cerebral meningioma, hyponatremia, and vitamin D deficiency brought in by EMS for rectal bleeding. CTAP showed no GI bleeding. 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 Also with new weakness, CTH showed no acute intracranial pathology. Course complicated by hypernatremia to 163 which resolved with D5 now at 142. MRI brain showing no acute intracranial pathology, and no brain metastasis. MRI lumbar showed L4 - L5 stenosis with no metastasis,    89y F with pmh Alzheimer's dementia, metastatic breast cancer, hypertension, stage 3a chronic kidney disease, hyperlipidemia, cerebral meningioma, hyponatremia, and vitamin D deficiency brought in by EMS for rectal bleeding. CTAP showed no GI bleeding. 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 Also with new weakness, CTH showed no acute intracranial pathology. Course complicated by hypernatremia to 163 which resolved with D5 now at 142. MRI brain showing no acute intracranial pathology, and no brain metastasis. MRI lumbar showed L4 - L5 stenosis with no metastasis,

## 2025-07-10 NOTE — PROGRESS NOTE ADULT - SUBJECTIVE AND OBJECTIVE BOX
Physical Medicine and Rehabilitation Progress Note :       Patient is a 89y old  Female who presents with a chief complaint of Rectal bleeding (10 Jul 2025 06:58)      HPI:  89y F with pmh dementia, metastatic breast cancer, hypertension, stage 3a chronic kidney disease, hyperlipidemia, cerebral meningioma, hyponatremia, and vitamin D deficiency brought in by EMS for rectal bleeding.  The patient's daughter reports noticing blood (maroon and dark in nature) in the stool for the past 4 days and denies associated nausea, vomiting, or diarrhea. The daughter reports that the patient had an ulcer on her lower back for 2 weeks and that she occasionally scratches it. The patient hasn't been eating for the past week and has been given ensure and soup by he daughter.  The patient was started on CMF chemotherapy regimen received 3 cycles of treatment  with the last one being on Betina-3-2025. She was scheduled for her next cycle on Betina 15 but was unable to attend to it as she has been having difficulty ambulating and became bedbound since that time,  She had rectal bleeding in 2021 and underwent colonoscopy which showed multiple polyps, all of which were removed.  She follows up with Dr. Tyson Schumacher (PCP), Dr. Poly Keith (med onc), Dr. Jesse Villeda (rad onc), and Dr. Mauri Arroyo (breast surgeon)      In the ED:  Vitals: T 97.9, BP 90/61, HR 96, RR 20, SpO2 98% (RA)  Labs: MCHC 28.6 (H), smudge cells, lactate 2.4, sodium 157, , Cr 1.59, eGFR 30, glucose 124, protein total 9.4 , albumin 2.7, Mg 3.1  Urine: cloudy, protein 30, RBC 99, WBC 7 , moderate leukocyte esterase, few bacteria  CXR: normal  Imaging: CT scan showed no evidence of active gastrointestinal bleeding. Partially visualized left breast mass largest measuring up to 4.7cm, and increase in size  2L fluids (05 Jul 2025 09:01)                            9.5    7.60  )-----------( 267      ( 09 Jul 2025 05:30 )             32.7       07-09    143  |  113[H]  |  27[H]  ----------------------------<  99  4.3   |  19[L]  |  0.95    Ca    8.0[L]      09 Jul 2025 05:30  Phos  3.1     07-09  Mg     2.1     07-09    TPro  7.2  /  Alb  2.4[L]  /  TBili  0.3  /  DBili  x   /  AST  31  /  ALT  18  /  AlkPhos  66  07-09    Vital Signs Last 24 Hrs  T(C): 37.5 (10 Jul 2025 06:14), Max: 37.5 (10 Jul 2025 06:14)  T(F): 99.5 (10 Jul 2025 06:14), Max: 99.5 (10 Jul 2025 06:14)  HR: 98 (10 Jul 2025 06:14) (90 - 98)  BP: 167/71 (10 Jul 2025 06:14) (112/68 - 167/71)  BP(mean): 82 (09 Jul 2025 12:10) (82 - 82)  RR: 17 (10 Jul 2025 06:14) (17 - 18)  SpO2: 98% (10 Jul 2025 06:14) (96% - 98%)    Parameters below as of 10 Jul 2025 06:14  Patient On (Oxygen Delivery Method): room air        MEDICATIONS  (STANDING):  dextrose 5%. 1000 milliLiter(s) (100 mL/Hr) IV Continuous <Continuous>  dextrose 5%. 1000 milliLiter(s) (50 mL/Hr) IV Continuous <Continuous>  dextrose 50% Injectable 25 Gram(s) IV Push once  dextrose 50% Injectable 12.5 Gram(s) IV Push once  dextrose 50% Injectable 25 Gram(s) IV Push once  enoxaparin Injectable 40 milliGRAM(s) SubCutaneous every 24 hours  glucagon  Injectable 1 milliGRAM(s) IntraMuscular once  multivitamin 1 Tablet(s) Oral daily  polyethylene glycol 3350 17 Gram(s) Oral every 12 hours  senna 2 Tablet(s) Oral at bedtime  valACYclovir 1000 milliGRAM(s) Oral two times a day    MEDICATIONS  (PRN):  acetaminophen     Tablet .. 650 milliGRAM(s) Oral every 6 hours PRN Temp greater or equal to 38C (100.4F), Mild Pain (1 - 3)  aluminum hydroxide/magnesium hydroxide/simethicone Suspension 30 milliLiter(s) Oral every 4 hours PRN Dyspepsia  benzonatate 100 milliGRAM(s) Oral three times a day PRN Cough  dextrose Oral Gel 15 Gram(s) Oral once PRN Blood Glucose LESS THAN 70 milliGRAM(s)/deciliter  melatonin 3 milliGRAM(s) Oral at bedtime PRN Insomnia      T(C): 37.5 (07-10-25 @ 06:14), Max: 37.5 (07-10-25 @ 06:14)  HR: 98 (07-10-25 @ 06:14) (90 - 98)  BP: 167/71 (07-10-25 @ 06:14) (112/68 - 167/71)  RR: 17 (07-10-25 @ 06:14) (17 - 18)  SpO2: 98% (07-10-25 @ 06:14) (96% - 98%)    Physical Exam:    89 y o woman  lying comfortably in semi Rodriguez's position , somnolent ,  NAD     Head: normocephalic , atraumatic    Eyes: PERRLA , EOMI , no nystagmus , sclera anicteric    ENT / FACE: neg nasal discharge , uvula midline , no oropharyngeal erythema / exudate    Neck: supple , negative JVD , negative carotid bruits , no thyromegaly    Chest: CTA bilaterally      Cardiovascular: regular rate and rhythm , neg murmurs / rubs / gallops    Abdomen: soft , non distended , no tenderness to palpation in all 4 quadrants ,  normal bowel sounds     Extremities: WWP , neg cyanosis /clubbing / edema     Neurologic Exam:     somnolent and oriented to person     Cranial Nerves:           II:                         pupils equal , round and reactive to light , visual fields intact         III/ IV/VI:             extraocular movements intact , neg nystagmus , neg ptosis        V:                        facial sensation intact , V1-3 normal        VII:                      face symmetric , no droop , normal eye closure and smile        VIII:                     hearing intact to finger rub bilaterally        IX and X:             no hoarseness , gag intact , palate/ uvula rise symmetrically        XI:                       SCM / trapezius strength intact bilateral        XII:                      no tongue deviation    Motor Exam:        poor effort 2/2 mental status > 3/5 x 4 extremities     Sensation:         withdraws to pinch  x 4 extremities     DTR:           biceps/brachioradialis: equal                            patella/ankle: equal          neg Babinski     7/9/2025  Functional Status Assessment :       Pain Assessment/Number Scale (0-10) Adult  Presence of Pain: denies pain/discomfort (Rating = 0)  Pain Rating (0-10): Rest: 0 (no pain/absence of nonverbal indicators of pain)  Pain Rating (0-10): Activity: 0 (no pain/absence of nonverbal indicators of pain)    Safety      -[facility] Functional Assessment: Basic Mobility  Type of Assessment: Daily assessment  Turning from your back to your side while in a flat bed without using bedrails?: 3 = A little assistance  Moving from lying on your back to sitting on the flat side of a flat bed without using bedrails?: 2 = A lot of assistance  Moving to and from a bed to a chair (including a wheelchair)?: 2 = A lot of assistance  Standing up from a chair using your arms (e.g. wheelchair or bedside chair)?: 2 = A lot of assistance  Walking in hospital room?: 2 = A lot of assistance  Climbing 3-5 steps with a railing?:    2-calculated by average   Score: 13   Row Comment: Ask the patient "How much help from another person do you currently need? (If the patient hasn't done an activity recently, how much help from another person do you think he/she needs if he/she tried?)    Cognitive/Neuro      Cognitive/Neuro/Behavioral  Level of Consciousness: confused  Arousal Level: arouses to voice  Orientation: disoriented to;  time;  situation;  knows hospital, not name   Speech: garbled  Mood/Behavior: calm;  cooperative    Language Assistance  Preferred Language to Address Healthcare Preferred Language to Address Healthcare: Sharon  's name: Chary    ID: [id number]     Therapeutic Interventions      Bed Mobility  Bed Mobility Training Rolling/Turning: minimum assist (75% patient effort);  verbal cues;  1 person assist  Bed Mobility Training Sit-to-Supine: moderate assist (50% patient effort);  verbal cues;  2 person assist  Bed Mobility Training Supine-to-Sit: moderate assist (50% patient effort);  verbal cues;  2 person assist  Bed Mobility Training Limitations: decreased strength;  impaired balance;  impaired postural control    Sit-Stand Transfer Training  Transfer Training Sit-to-Stand Transfer: maximum assist (25% patient effort);  verbal cues;  2 person assist;  hand held assist   Transfer Training Stand-to-Sit Transfer: maximum assist (25% patient effort);  verbal cues;  2 person assist;  hand held assist   Sit-to-Stand Transfer Training Transfer Safety Analysis: decreased weight-shifting ability;  decreased strength;  impaired balance;  impaired postural control    Gait Training  Gait Training: maximum assist (25% patient effort);  verbal cues;  2 person assist;  hand held assist ;  1 side step to L  Gait Analysis: 2-point gait   patient with unsteady gait, no overt LOB, verbal/tactile cues for weightshifting, distance limited by fatigue;  decreased laura;  decreased step length;  decreased strength;  impaired balance    Therapeutic Exercise  Therapeutic Exercise Detail: performed sit/stand transfer x 3 with maxA x 1 // LAQ x 8             PM&R Impression : as above    Current disposition plan recommendation :    subacute rehab placement

## 2025-07-10 NOTE — ADVANCED PRACTICE NURSE CONSULT - ASSESSMENT
89y F with pmh Alzheimer's dementia, metastatic breast cancer, hypertension, stage 3a chronic kidney disease, hyperlipidemia, cerebral meningioma, hyponatremia, and vitamin D deficiency brought in by EMS for rectal bleeding. CTAP showed no GI bleeding. 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 Also with new weakness, CTH showed no acute intracranial pathology. Course complicated by hypernatremia to 163 which resolved with D5 now at 142. MRI brain showing no acute intracranial pathology, and no brain metastasis. MRI lumbar showed L4 - L5 stenosis with no metastasis. Diffuse fungal rash to bilat posterior thighs and buttocks, encouraged daughter to not use diapers, which hold moisture against the skin. Fungal rash also noted to pannus, pt frequently scratching area. Antifungal moisture barrier ointment applied to areas, extra given to daughter for discharge. DTI to left heel measuring 3 x 4 cm, blood-filled blister. Unstageable pressure injury to right lateral malleolus measuring 2.5 x 3 cm, covered 90% with soft brown scab. Education provided to daughter on offloading heels and making sure right lateral malleolus is not touching anything, use pillows to protect if foot turns out. Triad ointment applied to both sites, covered with foam dressings. Small open lesions at mark rectal area, coccyx with unstageable pressure injury, thin layer of slough over wound bed. Site measures approx 3 x 3 cm.

## 2025-07-11 DIAGNOSIS — E87.70 FLUID OVERLOAD, UNSPECIFIED: ICD-10-CM

## 2025-07-11 DIAGNOSIS — R05.9 COUGH, UNSPECIFIED: ICD-10-CM

## 2025-07-11 LAB
ANION GAP SERPL CALC-SCNC: 12 MMOL/L — SIGNIFICANT CHANGE UP (ref 5–17)
BLD GP AB SCN SERPL QL: NEGATIVE — SIGNIFICANT CHANGE UP
BUN SERPL-MCNC: 42 MG/DL — HIGH (ref 7–23)
CALCIUM SERPL-MCNC: 7.8 MG/DL — LOW (ref 8.4–10.5)
CHLORIDE SERPL-SCNC: 104 MMOL/L — SIGNIFICANT CHANGE UP (ref 96–108)
CO2 SERPL-SCNC: 19 MMOL/L — LOW (ref 22–31)
CREAT SERPL-MCNC: 1.1 MG/DL — SIGNIFICANT CHANGE UP (ref 0.5–1.3)
EGFR: 47 ML/MIN/1.73M2 — LOW
EGFR: 47 ML/MIN/1.73M2 — LOW
GLUCOSE SERPL-MCNC: 159 MG/DL — HIGH (ref 70–99)
HCT VFR BLD CALC: 26.9 % — LOW (ref 34.5–45)
HGB BLD-MCNC: 8.2 G/DL — LOW (ref 11.5–15.5)
LACTATE SERPL-SCNC: 1.6 MMOL/L — SIGNIFICANT CHANGE UP (ref 0.5–2)
MCHC RBC-ENTMCNC: 28.1 PG — SIGNIFICANT CHANGE UP (ref 27–34)
MCHC RBC-ENTMCNC: 30.5 G/DL — LOW (ref 32–36)
MCV RBC AUTO: 92.1 FL — SIGNIFICANT CHANGE UP (ref 80–100)
NRBC # BLD AUTO: 0 K/UL — SIGNIFICANT CHANGE UP (ref 0–0)
NRBC # FLD: 0 K/UL — SIGNIFICANT CHANGE UP (ref 0–0)
NRBC BLD AUTO-RTO: 0 /100 WBCS — SIGNIFICANT CHANGE UP (ref 0–0)
PLATELET # BLD AUTO: 311 K/UL — SIGNIFICANT CHANGE UP (ref 150–400)
PMV BLD: 10.3 FL — SIGNIFICANT CHANGE UP (ref 7–13)
POTASSIUM SERPL-MCNC: 3.8 MMOL/L — SIGNIFICANT CHANGE UP (ref 3.5–5.3)
POTASSIUM SERPL-SCNC: 3.8 MMOL/L — SIGNIFICANT CHANGE UP (ref 3.5–5.3)
RBC # BLD: 2.92 M/UL — LOW (ref 3.8–5.2)
RBC # FLD: 20 % — HIGH (ref 10.3–14.5)
RH IG SCN BLD-IMP: POSITIVE — SIGNIFICANT CHANGE UP
SODIUM SERPL-SCNC: 135 MMOL/L — SIGNIFICANT CHANGE UP (ref 135–145)
WBC # BLD: 6.11 K/UL — SIGNIFICANT CHANGE UP (ref 3.8–10.5)
WBC # FLD AUTO: 6.11 K/UL — SIGNIFICANT CHANGE UP (ref 3.8–10.5)

## 2025-07-11 PROCEDURE — 71045 X-RAY EXAM CHEST 1 VIEW: CPT

## 2025-07-11 PROCEDURE — 84100 ASSAY OF PHOSPHORUS: CPT

## 2025-07-11 PROCEDURE — 83930 ASSAY OF BLOOD OSMOLALITY: CPT

## 2025-07-11 PROCEDURE — 81001 URINALYSIS AUTO W/SCOPE: CPT

## 2025-07-11 PROCEDURE — 83605 ASSAY OF LACTIC ACID: CPT

## 2025-07-11 PROCEDURE — 70450 CT HEAD/BRAIN W/O DYE: CPT

## 2025-07-11 PROCEDURE — 83735 ASSAY OF MAGNESIUM: CPT

## 2025-07-11 PROCEDURE — 82550 ASSAY OF CK (CPK): CPT

## 2025-07-11 PROCEDURE — 36415 COLL VENOUS BLD VENIPUNCTURE: CPT

## 2025-07-11 PROCEDURE — 84540 ASSAY OF URINE/UREA-N: CPT

## 2025-07-11 PROCEDURE — 97161 PT EVAL LOW COMPLEX 20 MIN: CPT

## 2025-07-11 PROCEDURE — 85610 PROTHROMBIN TIME: CPT

## 2025-07-11 PROCEDURE — 71045 X-RAY EXAM CHEST 1 VIEW: CPT | Mod: 26

## 2025-07-11 PROCEDURE — 86042 ACETYLCHOLN RCPTR BLCKG ANTB: CPT

## 2025-07-11 PROCEDURE — 84295 ASSAY OF SERUM SODIUM: CPT

## 2025-07-11 PROCEDURE — 83516 IMMUNOASSAY NONANTIBODY: CPT

## 2025-07-11 PROCEDURE — 99233 SBSQ HOSP IP/OBS HIGH 50: CPT

## 2025-07-11 PROCEDURE — 74018 RADEX ABDOMEN 1 VIEW: CPT

## 2025-07-11 PROCEDURE — 82803 BLOOD GASES ANY COMBINATION: CPT

## 2025-07-11 PROCEDURE — 97165 OT EVAL LOW COMPLEX 30 MIN: CPT

## 2025-07-11 PROCEDURE — 97110 THERAPEUTIC EXERCISES: CPT

## 2025-07-11 PROCEDURE — 94640 AIRWAY INHALATION TREATMENT: CPT

## 2025-07-11 PROCEDURE — 87086 URINE CULTURE/COLONY COUNT: CPT

## 2025-07-11 PROCEDURE — 86901 BLOOD TYPING SEROLOGIC RH(D): CPT

## 2025-07-11 PROCEDURE — 86366 MUSCLE-SPECIFIC KINASE ANTB: CPT

## 2025-07-11 PROCEDURE — 82570 ASSAY OF URINE CREATININE: CPT

## 2025-07-11 PROCEDURE — 70553 MRI BRAIN STEM W/O & W/DYE: CPT

## 2025-07-11 PROCEDURE — 87529 HSV DNA AMP PROBE: CPT

## 2025-07-11 PROCEDURE — 87040 BLOOD CULTURE FOR BACTERIA: CPT

## 2025-07-11 PROCEDURE — 84132 ASSAY OF SERUM POTASSIUM: CPT

## 2025-07-11 PROCEDURE — 82962 GLUCOSE BLOOD TEST: CPT

## 2025-07-11 PROCEDURE — 84133 ASSAY OF URINE POTASSIUM: CPT

## 2025-07-11 PROCEDURE — 82330 ASSAY OF CALCIUM: CPT

## 2025-07-11 PROCEDURE — 86255 FLUORESCENT ANTIBODY SCREEN: CPT

## 2025-07-11 PROCEDURE — 80048 BASIC METABOLIC PNL TOTAL CA: CPT

## 2025-07-11 PROCEDURE — 85025 COMPLETE CBC W/AUTO DIFF WBC: CPT

## 2025-07-11 PROCEDURE — A9585: CPT

## 2025-07-11 PROCEDURE — 87798 DETECT AGENT NOS DNA AMP: CPT

## 2025-07-11 PROCEDURE — 85730 THROMBOPLASTIN TIME PARTIAL: CPT

## 2025-07-11 PROCEDURE — 83690 ASSAY OF LIPASE: CPT

## 2025-07-11 PROCEDURE — 84156 ASSAY OF PROTEIN URINE: CPT

## 2025-07-11 PROCEDURE — 74174 CTA ABD&PLVS W/CONTRAST: CPT

## 2025-07-11 PROCEDURE — 86850 RBC ANTIBODY SCREEN: CPT

## 2025-07-11 PROCEDURE — 83036 HEMOGLOBIN GLYCOSYLATED A1C: CPT

## 2025-07-11 PROCEDURE — 72158 MRI LUMBAR SPINE W/O & W/DYE: CPT

## 2025-07-11 PROCEDURE — 97116 GAIT TRAINING THERAPY: CPT

## 2025-07-11 PROCEDURE — 80053 COMPREHEN METABOLIC PANEL: CPT

## 2025-07-11 PROCEDURE — 86043 ACETYLCHOLN RCPTR MODLG ANTB: CPT

## 2025-07-11 PROCEDURE — 86900 BLOOD TYPING SEROLOGIC ABO: CPT

## 2025-07-11 PROCEDURE — 84300 ASSAY OF URINE SODIUM: CPT

## 2025-07-11 RX ORDER — SODIUM CHLORIDE 9 G/1000ML
500 INJECTION, SOLUTION INTRAVENOUS ONCE
Refills: 0 | Status: COMPLETED | OUTPATIENT
Start: 2025-07-11 | End: 2025-07-11

## 2025-07-11 RX ORDER — IPRATROPIUM BROMIDE AND ALBUTEROL SULFATE .5; 2.5 MG/3ML; MG/3ML
3 SOLUTION RESPIRATORY (INHALATION) EVERY 6 HOURS
Refills: 0 | Status: DISCONTINUED | OUTPATIENT
Start: 2025-07-11 | End: 2025-07-16

## 2025-07-11 RX ORDER — FUROSEMIDE 10 MG/ML
20 INJECTION INTRAMUSCULAR; INTRAVENOUS ONCE
Refills: 0 | Status: COMPLETED | OUTPATIENT
Start: 2025-07-11 | End: 2025-07-11

## 2025-07-11 RX ADMIN — POLYETHYLENE GLYCOL 3350 17 GRAM(S): 17 POWDER, FOR SOLUTION ORAL at 18:52

## 2025-07-11 RX ADMIN — Medication 650 MILLIGRAM(S): at 19:46

## 2025-07-11 RX ADMIN — POLYETHYLENE GLYCOL 3350 17 GRAM(S): 17 POWDER, FOR SOLUTION ORAL at 06:00

## 2025-07-11 RX ADMIN — Medication 1000 MILLIGRAM(S): at 06:01

## 2025-07-11 RX ADMIN — Medication 100 MILLIGRAM(S): at 12:57

## 2025-07-11 RX ADMIN — Medication 1000 MILLIGRAM(S): at 18:36

## 2025-07-11 RX ADMIN — ENOXAPARIN SODIUM 40 MILLIGRAM(S): 100 INJECTION SUBCUTANEOUS at 15:35

## 2025-07-11 RX ADMIN — Medication 650 MILLIGRAM(S): at 18:46

## 2025-07-11 RX ADMIN — FUROSEMIDE 20 MILLIGRAM(S): 10 INJECTION INTRAMUSCULAR; INTRAVENOUS at 14:15

## 2025-07-11 RX ADMIN — IPRATROPIUM BROMIDE AND ALBUTEROL SULFATE 3 MILLILITER(S): .5; 2.5 SOLUTION RESPIRATORY (INHALATION) at 13:22

## 2025-07-11 RX ADMIN — Medication 100 MILLIGRAM(S): at 06:18

## 2025-07-11 RX ADMIN — SODIUM CHLORIDE 500 MILLILITER(S): 9 INJECTION, SOLUTION INTRAVENOUS at 22:58

## 2025-07-11 RX ADMIN — NYSTATIN 1 APPLICATION(S): 100000 CREAM TOPICAL at 06:01

## 2025-07-11 RX ADMIN — Medication 2 TABLET(S): at 21:26

## 2025-07-11 RX ADMIN — IPRATROPIUM BROMIDE AND ALBUTEROL SULFATE 3 MILLILITER(S): .5; 2.5 SOLUTION RESPIRATORY (INHALATION) at 21:26

## 2025-07-11 RX ADMIN — Medication 100 MILLIGRAM(S): at 21:27

## 2025-07-11 RX ADMIN — NYSTATIN 1 APPLICATION(S): 100000 CREAM TOPICAL at 18:46

## 2025-07-11 RX ADMIN — Medication 1 TABLET(S): at 12:53

## 2025-07-11 NOTE — PROGRESS NOTE ADULT - PROBLEM SELECTOR PLAN 2
- TOV passed, no catheter  - possible etiology: constipation, KUB today with moderate fecal material in colon, miralax and senna ordered - possible etiology: constipation, KUB today with moderate fecal material in colon, miralax and senna ordered  - resolved

## 2025-07-11 NOTE — PROGRESS NOTE ADULT - PROBLEM SELECTOR PLAN 1
- new onset subjective weakness   - non-focal, neuro following   - MRI with/without contrast brain and L spine showing no acute intracranial pathology and no mets to brain or spine. Spine showing stenosis L4 - L5  - NIF/VC ordered PND  - CK, Myasthenia Gravis/Lambert-Eaton Myasthenic Syndrome Evaluation, Acetylcholine Modulating AB, Acetylcholine Blocking, LRP4 Autoantibody Test, Musk antibody, Paraneoplastic panel, ganglioside Ab ordered, PND to be done outpatient as per neuro

## 2025-07-11 NOTE — CHART NOTE - NSCHARTNOTEFT_GEN_A_CORE
Admitting Diagnosis:   Patient is a 89y old  Female who presents with a chief complaint of Rectal bleeding (11 Jul 2025 06:56)      PAST MEDICAL & SURGICAL HISTORY:  Alzheimer's disease  Alzheimer's dementia  Hyperlipidemia  Hyperlipidemia  Essential hypertension  HTN (hypertension)  Benign neoplasm of cerebral meninges  Left frontal lobe  Breast cancer    Current Nutrition Order: puree       PO Intake: Good (%) [   ]  Fair (50-75%) [ x  ] Poor (<25%) [   ]    GI Issues: Fecal incontinence, last BM 7/9 per chart    Pain: none documented    Skin Integrity: +1 BL edema to BL ankles, +2 edema to L hand and arm, stage 3 pressure ulcer to sacrum, stage 3 pressure ulcer to BL buttocks, DTI to L heel, unstageable pressure ulcer to R ankle, stage 3 pressure ulcer to R rectum        07-10-25 @ 07:01  -  07-11-25 @ 07:00  --------------------------------------------------------  IN: 0 mL / OUT: 1750 mL / NET: -1750 mL      CAPILLARY BLOOD GLUCOSE      POCT Blood Glucose.: 105 mg/dL (11 Jul 2025 07:53)  POCT Blood Glucose.: 139 mg/dL (10 Jul 2025 21:59)      Medications:  MEDICATIONS  (STANDING):  dextrose 5%. 1000 milliLiter(s) (100 mL/Hr) IV Continuous <Continuous>  dextrose 5%. 1000 milliLiter(s) (50 mL/Hr) IV Continuous <Continuous>  dextrose 50% Injectable 25 Gram(s) IV Push once  dextrose 50% Injectable 12.5 Gram(s) IV Push once  dextrose 50% Injectable 25 Gram(s) IV Push once  enoxaparin Injectable 40 milliGRAM(s) SubCutaneous every 24 hours  glucagon  Injectable 1 milliGRAM(s) IntraMuscular once  multivitamin 1 Tablet(s) Oral daily  nystatin Powder 1 Application(s) Topical every 12 hours  polyethylene glycol 3350 17 Gram(s) Oral every 12 hours  senna 2 Tablet(s) Oral at bedtime  valACYclovir 1000 milliGRAM(s) Oral two times a day    MEDICATIONS  (PRN):  acetaminophen     Tablet .. 650 milliGRAM(s) Oral every 6 hours PRN Temp greater or equal to 38C (100.4F), Mild Pain (1 - 3)  aluminum hydroxide/magnesium hydroxide/simethicone Suspension 30 milliLiter(s) Oral every 4 hours PRN Dyspepsia  benzonatate 100 milliGRAM(s) Oral three times a day PRN Cough  dextrose Oral Gel 15 Gram(s) Oral once PRN Blood Glucose LESS THAN 70 milliGRAM(s)/deciliter  melatonin 3 milliGRAM(s) Oral at bedtime PRN Insomnia      Weight:  Height for BMI (FEET)	4 Feet  Height for BMI (INCHES)	11 Inch(s)  Height for BMI (CENTIMETERS)	149.86 Centimeter(s)  Weight for BMI (lbs)	147 lb  Weight for BMI (kg)	66.7 kg  Body Mass Index	29.6    Weight Change: no new weights to assess. Recommend nursing obtain and document weekly weights.    Estimated energy needs:   Estimated Energy Needs Weight (lbs)	97.5 lb  Estimated Energy Needs Weight (kg)	44.2 kg  Estimated Energy Needs From (shashank/kg)	30  Estimated Energy Needs To (shashank/kg)	35  Estimated Energy Needs Calculated From (shashank/kg)	1326  Estimated Energy Needs Calculated To (shashank/kg)	1547    Estimated Protein Needs Weight (lbs)	97.5 lb  Estimated Protein Needs Weight (kg)	44.2 kg  Estimated Protein Needs From (g/kg)	1.5  Estimated Protein Needs To (g/kg)	2  Estimated Protein Needs Calculated From (g/kg)	66.3  Estimated Protein Needs Calculated To (g/kg)	88.4  Other Calculations	IBW 97.5 pounds. Patient above % IBW (151%) thus ideal body weight used for all calculations. Needs adjusted for advanced age, metastatic cancer, multiple pressure ulcers. Fluids per team due to edema.    Subjective: Per H&P: 89y F with pmh Alzheimer's dementia, metastatic breast cancer, hypertension, stage 3a chronic kidney disease, hyperlipidemia, cerebral meningioma, hyponatremia, and vitamin D deficiency brought in by EMS for rectal bleeding. CTAP showed no GI bleeding. 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 Also with new weakness, CTH showed no acute intracranial pathology. Course complicated by hypernatremia to 163 which resolved with D5.     Patient seen at bedside for follow up assessment. Current diet order: soft and bite sized. Family member not at bedside, however PCA reports good appetite/PO intake ~50-75% of meals. Reports she is consuming the Magic cup, fortified mashed potatoes, and Mighty shake previously added by RD. Blood sugars 105-156 x24hrs. Meds: antibiotic, MVI, bowel regimen. RD to f/u prn.    Previous Nutrition Diagnosis: Increased nutrient needs (calories/protein) related to increased physiological demand for nutrients as evidenced by presence of multiple pressure ulcers.    Active [  x ]  Resolved [   ]    Goal: Patient to meet at least 75% of nutritional needs consistently     Recommendations:  1. Continue with puree diet per SLP recommendations. RD to add Magic cup BID (290kcal, 9g protein per serving), Mighty Shake BID (220kcal, 6g protein per serving), fortified mashed potatoes (240kcal, 14g protein per serving). Recommend MVI to promote wound healing. Consider adding Vitamin C and zinc sulfate.  2. Encourage pt to meet nutritional needs as able   3. Monitor labs: electrolytes, cbc  4. Monitor weights   5. Pain and bowel regimen per team   6. Will continue to assess/honor food preferences as able
Patient will require bedside commode because they are confined to a single room.
Semi-electric bed (REBECA 99 years): The patient requires a semi-electric bed with gel overlay. Patient has a medical condition (Decontitioning;R53.81), which requires positioning of the body in a way that is not feasible in an ordinary bed. Patient requires the head of the bed to be elevated more than 30 degrees most of the time due to aspiration risk.
Sodium within normal limits  Thank you for consulting Nephrology. We will be signing off on this case, but please reconsult as needed.    Cayetano Manzano MD,   PGY-4 Nephrology Fellow
89y F with PMH of Alzheimer's dementia, metastatic breast cancer, hypertension, stage 3a chronic kidney disease admitted for sacral wound bleeding. Nephrology consulted for hypernatremia    -Chronic hypernatremia:  No associated polyuria or diarrhea  When consulted (7/5 at 18:00): 164  Estimated free water deficit: 4L to reach 152  No evidence that rapid correction of hypernatremia is associated with a higher risk for mortality, seizure, alteration of consciousness, and/or cerebral edema (Jose Elias BARRIGA and al. Rate of Correction of Hypernatremia and Health Outcomes in Critically Ill Patients. Clin J Am Soc Nephrol. 2019)  -Start dextrose 5% at rate of 175 mL/h for the next 24 hours  -BMP every 6 hours    -Nonoliguric RAFFI:  Likely prerenal azotemia or ischemic ATN  -Strict I/O monitoring  -BMP every 6 hours

## 2025-07-11 NOTE — PROGRESS NOTE ADULT - PROBLEM SELECTOR PLAN 8
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 She was deemed as not a candidate for immunotherapy or surgery. She was started on chemotherapy and underwent 3 cycles, last one on 3-June 2025.  Per daughter, patient missed her cycle on Betina-15 due to difficulty ambulation.  She follows up with Dr. Poly Keith (med onc), Dr. Jesse Villeda (rad onc), and Dr. Mauri Arroyo (breast surgeon)    - Palliative consulted, f/u recs  - Dr. Keith and Dr. Arroyo were notified via email Pt with tachypnea and cough  Pt treated for hypernatremia with fluids    - CXR ordered  - start Duoneb q6h  - RVP panel ordered  - start 20mg laxis  - Bladder scan 2 hours after lasix administration, then again in

## 2025-07-11 NOTE — PROGRESS NOTE ADULT - ATTENDING COMMENTS
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 herpetic looking) and found to have ME/FTT with hyponatremia, RAFFI on CKD3a and functional quadriplegia Vs gait abnormality /bed bound for 5 days.-     am encounter sleepy, mild tachypnea with some cough-given nebs   chest x ray done and reviewed   she received around 6 L of ivf on admission for hypernatremia and raffi- now corrected, possibly fluid overload-     # rectal bleed likely 2/2 sacral ulcer ( HSV detected)   - MRI lumber spine showed no enhancement of thecal sac, pt is clinically better from ME/corrected hypernatremia and RAFFI  - ID consult appreciated  Dr. Dorsey, agreed to d/c acyclovir and resume valtrex 1g PO bid for total 7 days for HSV sacral ulcer   -  Valtrex 1g PO bid for 10 days ( 7/7-7/16) then reduced to 500mg po bid for HSV ppx if continuing on chemoRx   - Neuro f/u appreicated   - local wound care w/ foam dressing    # Hx Hyponatremia/SIADH  # Hypernatremia ( corrected) -most recent sodium 143   # RAFFI corrected ( likely 2/2 prerenal azotemia Vs ischemic ATN) on CKD3a  # acute urinary retention likely 2/2 constipation( moderate stool burden seen on CT)   - Renal f/u appreciated  sp pinzon -now on purewick catheter.   - bowel regimen for constipation; c/w Senna 2 tabs qHS and miralax 17g PO daily ok  # FTT likely related to chemoRx   # Protein Calorie malnutrition (albumin 2.6-2.7)   - Nutrition consult   - Ensure max protein (Vanilla) bid     # Deconditioning   # Functional quadriplegia   # gait abnormality to rule out Brain mets Vs stroke Vs medical induced neuropathy , doubt Elsberg syndrome   - Neuro consult appreciated   - CTH negative( 7/6)  - MRI brain w/wo contrast and MRI lumber spine w/wo contrast ( 7/7) negative brain/spine mets, no enhancement of thecal sac  - CK 77  - fu on the extensive labs sent for weakness --   - Fall precautions   - PT eval reviewed, required 2 people assistance and unable to full stand  daughterYoli whom now agreed to JEREMY placement     # Stage IIIc/T3N2/Mo triple negative IDC L breast ( TNBC)  - undergoing neoadjuvant chemo; CMF s/p 3 cycles ( 4/15, 5/13 and 6/3)    - Breast surgeon Dr. Mauri arroyo, Med Onc Dr. Poly Keith  - Palliative care consult appreciated for [facility]     # Alzheimer's dementia wo behavioral disturbance  - AAOx1  - SLP eval appreciated rec: puree w/ thin   - delirium precaution   # mild tachypena with cough, given nebs - she received 6 L of ivf on admission - to give lasix 20 mg iv x 1 ( bladder scan to ensure she is voiding well )  can continue with nebs   # DVT ppx:  Lovenox     # Advance directives: DNR/DNI (MOLST form filled out) [facility]  no heroic measures i.e. CPR or intubation in an event required resuscitation. Informed Dr. Arroyo and Dr. Keith about admission     Contacts:  legal guardian( daughter): Bozena (Yoli) Tomasa [phone]  PMD Dr.Daniel Schumacher  Breast Surgeon: Dr.Paul Arroyo   Med Onc Dr. Poly Keith   Rad Onc Dr. Jesse Villeda     # Disposition: medically ready, plan for JEREMY placement -waiting for auth   dw daughter at bedside, medical team and RN.

## 2025-07-11 NOTE — PROGRESS NOTE ADULT - PROBLEM SELECTOR PLAN 6
Patient's daughter was told that the patient has CKD but patient never experienced any problems with urination and has appropriate urine output.  On admission: Cr 1.59, , eGFR 30  Now: Cr .87 BUN 34, eGFR 63    - RAFFI resolved Patient has a history of CKD stage 3a and hyponatremia (possibly 2/2 SIADH per outpatient nephro note).  Sodium was 157 on admission, went up to 165 after the administration of 2L of NS.  Patient has 4.3L free water deficit  Renal consulted, given 165mL/hr D5  Na downtrending 144 > 142    - resolved

## 2025-07-11 NOTE — PROGRESS NOTE ADULT - PROBLEM SELECTOR PLAN 12
Plan: F: tolerating PO, no IVF  E: replete K<4, Mg<2  N: puree    VTE Prophylaxis: Lovenox  GI: miralax  C: DNR/DNI  D: RMF. Plan: F: tolerating PO, no IVF  E: replete K<4, Mg<2  N: puree    VTE Prophylaxis: Lovenox  GI: miralax + senna   C: DNR/DNI  D: RMF.

## 2025-07-11 NOTE — PROGRESS NOTE ADULT - PROBLEM SELECTOR PLAN 5
Patient has a history of CKD stage 3a and hyponatremia (possibly 2/2 SIADH per outpatient nephro note).  Sodium was 157 on admission, went up to 165 after the administration of 2L of NS.  Patient has 4.3L free water deficit  Renal consulted, given 165mL/hr D5  Na downtrending 144 > 142    - resolved Pt treated for hypernatremia with multiple IVs of dextrose, LR, and NS  Now having dry cough    - CXR ordered  - start Duoneb q6h  - RVP panel ordered  - start 20mg laxis  - Bladder scan 2 hours after lasix administration, then again in

## 2025-07-11 NOTE — PROGRESS NOTE ADULT - SUBJECTIVE AND OBJECTIVE BOX
INTERVAL/OVERNIGHT EVENTS: None    SUBJECTIVE:  Patient seen and examined at bedside, comfortable and sleeping. AOx1, baseline for pt. following commands, no focal deficits.     Vital Signs Last 12 Hrs  T(F): 98.6 (07-06-25 @ 11:44), Max: 98.6 (07-06-25 @ 11:44)  HR: 92 (07-06-25 @ 11:44) (85 - 92)  BP: 138/66 (07-06-25 @ 11:44) (132/50 - 138/66)  BP(mean): 90 (07-06-25 @ 11:44) (78 - 90)  RR: 18 (07-06-25 @ 11:44) (18 - 18)  SpO2: 98% (07-06-25 @ 11:44) (95% - 98%)  I&O's Summary    05 Jul 2025 07:01  -  06 Jul 2025 07:00  --------------------------------------------------------  IN: 0 mL / OUT: 1350 mL / NET: -1350 mL    06 Jul 2025 07:01  -  06 Jul 2025 14:01  --------------------------------------------------------  IN: 0 mL / OUT: 400 mL / NET: -400 mL        PHYSICAL EXAM:  General: NAD  HEENT: PERRL, sclera anicteric, MMM  Cardiovascular: RRR; no MRG;   Respiratory: CTA b/l; no w/r/r  GI/: soft; NTND; BS x4  Extremities: WWP; 2+ peripheral pulses bilaterally; no LE edema  Skin: normal color & turgor; no rash  Neurologic: aox1; no focal deficits    LABS:                        9.8    11.98 )-----------( 294      ( 06 Jul 2025 06:45 )             34.9     07-06    152[H]  |  123[H]  |  82[H]  ----------------------------<  160[H]  3.8   |  21[L]  |  1.10    Ca    8.0[L]      06 Jul 2025 06:45  Phos  2.6     07-06  Mg     2.3     07-06    TPro  6.9  /  Alb  2.3[L]  /  TBili  0.4  /  DBili  x   /  AST  20  /  ALT  12  /  AlkPhos  53  07-06    PT/INR - ( 05 Jul 2025 04:38 )   PT: 13.0 sec;   INR: 1.11          PTT - ( 05 Jul 2025 04:38 )  PTT:35.7 sec  Urinalysis Basic - ( 06 Jul 2025 06:45 )    Color: x / Appearance: x / SG: x / pH: x  Gluc: 160 mg/dL / Ketone: x  / Bili: x / Urobili: x   Blood: x / Protein: x / Nitrite: x   Leuk Esterase: x / RBC: x / WBC x   Sq Epi: x / Non Sq Epi: x / Bacteria: x          RADIOLOGY & ADDITIONAL TESTS:    MEDICATIONS  (STANDING):  dextrose 5%. 1000 milliLiter(s) (175 mL/Hr) IV Continuous <Continuous>  dextrose 5%. 1000 milliLiter(s) (100 mL/Hr) IV Continuous <Continuous>  dextrose 5%. 1000 milliLiter(s) (50 mL/Hr) IV Continuous <Continuous>  dextrose 50% Injectable 25 Gram(s) IV Push once  dextrose 50% Injectable 12.5 Gram(s) IV Push once  dextrose 50% Injectable 25 Gram(s) IV Push once  enoxaparin Injectable 40 milliGRAM(s) SubCutaneous every 24 hours  glucagon  Injectable 1 milliGRAM(s) IntraMuscular once  polyethylene glycol 3350 17 Gram(s) Oral every 12 hours  senna 2 Tablet(s) Oral at bedtime    MEDICATIONS  (PRN):  acetaminophen     Tablet .. 650 milliGRAM(s) Oral every 6 hours PRN Temp greater or equal to 38C (100.4F), Mild Pain (1 - 3)  aluminum hydroxide/magnesium hydroxide/simethicone Suspension 30 milliLiter(s) Oral every 4 hours PRN Dyspepsia  benzonatate 100 milliGRAM(s) Oral three times a day PRN Cough  dextrose Oral Gel 15 Gram(s) Oral once PRN Blood Glucose LESS THAN 70 milliGRAM(s)/deciliter  melatonin 3 milliGRAM(s) Oral at bedtime PRN Insomnia   INTERVAL/OVERNIGHT EVENTS: None    SUBJECTIVE:  Patient seen and examined at bedside, comfortable and sleeping. AOx1, baseline for pt. following commands, no focal deficits. Pt found to be coughing periodically with no objective fever on vitals     Vital Signs Last 12 Hrs  T(F): 98.6 (07-06-25 @ 11:44), Max: 98.6 (07-06-25 @ 11:44)  HR: 92 (07-06-25 @ 11:44) (85 - 92)  BP: 138/66 (07-06-25 @ 11:44) (132/50 - 138/66)  BP(mean): 90 (07-06-25 @ 11:44) (78 - 90)  RR: 18 (07-06-25 @ 11:44) (18 - 18)  SpO2: 98% (07-06-25 @ 11:44) (95% - 98%)  I&O's Summary    05 Jul 2025 07:01  -  06 Jul 2025 07:00  --------------------------------------------------------  IN: 0 mL / OUT: 1350 mL / NET: -1350 mL    06 Jul 2025 07:01  -  06 Jul 2025 14:01  --------------------------------------------------------  IN: 0 mL / OUT: 400 mL / NET: -400 mL        PHYSICAL EXAM:  General: NAD  HEENT: PERRL, sclera anicteric, MMM  Cardiovascular: RRR; no MRG;   Respiratory: CTA b/l; no w/r/r  GI/: soft; NTND; BS x4  Extremities: WWP; 2+ peripheral pulses bilaterally; no LE edema  Skin: normal color & turgor; no rash  Neurologic: aox1; no focal deficits    LABS:                        9.8    11.98 )-----------( 294      ( 06 Jul 2025 06:45 )             34.9     07-06    152[H]  |  123[H]  |  82[H]  ----------------------------<  160[H]  3.8   |  21[L]  |  1.10    Ca    8.0[L]      06 Jul 2025 06:45  Phos  2.6     07-06  Mg     2.3     07-06    TPro  6.9  /  Alb  2.3[L]  /  TBili  0.4  /  DBili  x   /  AST  20  /  ALT  12  /  AlkPhos  53  07-06    PT/INR - ( 05 Jul 2025 04:38 )   PT: 13.0 sec;   INR: 1.11          PTT - ( 05 Jul 2025 04:38 )  PTT:35.7 sec  Urinalysis Basic - ( 06 Jul 2025 06:45 )    Color: x / Appearance: x / SG: x / pH: x  Gluc: 160 mg/dL / Ketone: x  / Bili: x / Urobili: x   Blood: x / Protein: x / Nitrite: x   Leuk Esterase: x / RBC: x / WBC x   Sq Epi: x / Non Sq Epi: x / Bacteria: x          RADIOLOGY & ADDITIONAL TESTS:    MEDICATIONS  (STANDING):  dextrose 5%. 1000 milliLiter(s) (175 mL/Hr) IV Continuous <Continuous>  dextrose 5%. 1000 milliLiter(s) (100 mL/Hr) IV Continuous <Continuous>  dextrose 5%. 1000 milliLiter(s) (50 mL/Hr) IV Continuous <Continuous>  dextrose 50% Injectable 25 Gram(s) IV Push once  dextrose 50% Injectable 12.5 Gram(s) IV Push once  dextrose 50% Injectable 25 Gram(s) IV Push once  enoxaparin Injectable 40 milliGRAM(s) SubCutaneous every 24 hours  glucagon  Injectable 1 milliGRAM(s) IntraMuscular once  polyethylene glycol 3350 17 Gram(s) Oral every 12 hours  senna 2 Tablet(s) Oral at bedtime    MEDICATIONS  (PRN):  acetaminophen     Tablet .. 650 milliGRAM(s) Oral every 6 hours PRN Temp greater or equal to 38C (100.4F), Mild Pain (1 - 3)  aluminum hydroxide/magnesium hydroxide/simethicone Suspension 30 milliLiter(s) Oral every 4 hours PRN Dyspepsia  benzonatate 100 milliGRAM(s) Oral three times a day PRN Cough  dextrose Oral Gel 15 Gram(s) Oral once PRN Blood Glucose LESS THAN 70 milliGRAM(s)/deciliter  melatonin 3 milliGRAM(s) Oral at bedtime PRN Insomnia

## 2025-07-11 NOTE — PROGRESS NOTE ADULT - PROBLEM SELECTOR PLAN 10
Daughter reports the patient had a brain mass that was removed in 2015 Patient has a history of dementia. A&Ox1 to person only, but daughter states that this is the baseline and denies any deterioration and confusion.    - CTM    #History of cerebral meningioma  - Daughter reports the patient had a brain mass that was removed in 2015

## 2025-07-12 LAB
ANION GAP SERPL CALC-SCNC: 11 MMOL/L — SIGNIFICANT CHANGE UP (ref 5–17)
BUN SERPL-MCNC: 45 MG/DL — HIGH (ref 7–23)
CALCIUM SERPL-MCNC: 7.9 MG/DL — LOW (ref 8.4–10.5)
CHLORIDE SERPL-SCNC: 107 MMOL/L — SIGNIFICANT CHANGE UP (ref 96–108)
CO2 SERPL-SCNC: 17 MMOL/L — LOW (ref 22–31)
CREAT SERPL-MCNC: 1.03 MG/DL — SIGNIFICANT CHANGE UP (ref 0.5–1.3)
EGFR: 51 ML/MIN/1.73M2 — LOW
EGFR: 51 ML/MIN/1.73M2 — LOW
GLUCOSE SERPL-MCNC: 106 MG/DL — HIGH (ref 70–99)
HCT VFR BLD CALC: 28.7 % — LOW (ref 34.5–45)
HGB BLD-MCNC: 9 G/DL — LOW (ref 11.5–15.5)
MAGNESIUM SERPL-MCNC: 1.7 MG/DL — SIGNIFICANT CHANGE UP (ref 1.6–2.6)
MCHC RBC-ENTMCNC: 28.8 PG — SIGNIFICANT CHANGE UP (ref 27–34)
MCHC RBC-ENTMCNC: 31.4 G/DL — LOW (ref 32–36)
MCV RBC AUTO: 92 FL — SIGNIFICANT CHANGE UP (ref 80–100)
NRBC # BLD AUTO: 0 K/UL — SIGNIFICANT CHANGE UP (ref 0–0)
NRBC # FLD: 0 K/UL — SIGNIFICANT CHANGE UP (ref 0–0)
NRBC BLD AUTO-RTO: 0 /100 WBCS — SIGNIFICANT CHANGE UP (ref 0–0)
PHOSPHATE SERPL-MCNC: 4.5 MG/DL — SIGNIFICANT CHANGE UP (ref 2.5–4.5)
PLATELET # BLD AUTO: 330 K/UL — SIGNIFICANT CHANGE UP (ref 150–400)
PMV BLD: 10.5 FL — SIGNIFICANT CHANGE UP (ref 7–13)
POTASSIUM SERPL-MCNC: 3.8 MMOL/L — SIGNIFICANT CHANGE UP (ref 3.5–5.3)
POTASSIUM SERPL-SCNC: 3.8 MMOL/L — SIGNIFICANT CHANGE UP (ref 3.5–5.3)
RAPID RVP RESULT: SIGNIFICANT CHANGE UP
RBC # BLD: 3.12 M/UL — LOW (ref 3.8–5.2)
RBC # FLD: 20.2 % — HIGH (ref 10.3–14.5)
SARS-COV-2 RNA SPEC QL NAA+PROBE: SIGNIFICANT CHANGE UP
SODIUM SERPL-SCNC: 135 MMOL/L — SIGNIFICANT CHANGE UP (ref 135–145)
WBC # BLD: 8.49 K/UL — SIGNIFICANT CHANGE UP (ref 3.8–10.5)
WBC # FLD AUTO: 8.49 K/UL — SIGNIFICANT CHANGE UP (ref 3.8–10.5)

## 2025-07-12 PROCEDURE — 99233 SBSQ HOSP IP/OBS HIGH 50: CPT

## 2025-07-12 RX ORDER — MAGNESIUM SULFATE 500 MG/ML
2 SYRINGE (ML) INJECTION ONCE
Refills: 0 | Status: COMPLETED | OUTPATIENT
Start: 2025-07-12 | End: 2025-07-12

## 2025-07-12 RX ORDER — FLUTICASONE PROPIONATE 50 UG/1
1 SPRAY, METERED NASAL
Refills: 0 | Status: DISCONTINUED | OUTPATIENT
Start: 2025-07-12 | End: 2025-07-16

## 2025-07-12 RX ORDER — IPRATROPIUM BROMIDE AND ALBUTEROL SULFATE .5; 2.5 MG/3ML; MG/3ML
3 SOLUTION RESPIRATORY (INHALATION) EVERY 6 HOURS
Refills: 0 | Status: DISCONTINUED | OUTPATIENT
Start: 2025-07-12 | End: 2025-07-12

## 2025-07-12 RX ADMIN — Medication 100 MILLIEQUIVALENT(S): at 10:36

## 2025-07-12 RX ADMIN — Medication 25 GRAM(S): at 07:54

## 2025-07-12 RX ADMIN — IPRATROPIUM BROMIDE AND ALBUTEROL SULFATE 3 MILLILITER(S): .5; 2.5 SOLUTION RESPIRATORY (INHALATION) at 18:35

## 2025-07-12 RX ADMIN — Medication 100 MILLIGRAM(S): at 06:00

## 2025-07-12 RX ADMIN — IPRATROPIUM BROMIDE AND ALBUTEROL SULFATE 3 MILLILITER(S): .5; 2.5 SOLUTION RESPIRATORY (INHALATION) at 10:37

## 2025-07-12 RX ADMIN — Medication 1 TABLET(S): at 11:24

## 2025-07-12 RX ADMIN — NYSTATIN 1 APPLICATION(S): 100000 CREAM TOPICAL at 18:36

## 2025-07-12 RX ADMIN — NYSTATIN 1 APPLICATION(S): 100000 CREAM TOPICAL at 05:46

## 2025-07-12 RX ADMIN — Medication 1000 MILLIGRAM(S): at 05:46

## 2025-07-12 RX ADMIN — IPRATROPIUM BROMIDE AND ALBUTEROL SULFATE 3 MILLILITER(S): .5; 2.5 SOLUTION RESPIRATORY (INHALATION) at 22:34

## 2025-07-12 RX ADMIN — Medication 1000 MILLIGRAM(S): at 18:35

## 2025-07-12 RX ADMIN — ENOXAPARIN SODIUM 40 MILLIGRAM(S): 100 INJECTION SUBCUTANEOUS at 18:42

## 2025-07-12 RX ADMIN — Medication 650 MILLIGRAM(S): at 19:35

## 2025-07-12 RX ADMIN — IPRATROPIUM BROMIDE AND ALBUTEROL SULFATE 3 MILLILITER(S): .5; 2.5 SOLUTION RESPIRATORY (INHALATION) at 03:04

## 2025-07-12 RX ADMIN — Medication 2 TABLET(S): at 22:35

## 2025-07-12 RX ADMIN — Medication 100 MILLIEQUIVALENT(S): at 07:59

## 2025-07-12 RX ADMIN — Medication 650 MILLIGRAM(S): at 18:35

## 2025-07-12 RX ADMIN — POLYETHYLENE GLYCOL 3350 17 GRAM(S): 17 POWDER, FOR SOLUTION ORAL at 18:35

## 2025-07-12 NOTE — PROGRESS NOTE ADULT - PROBLEM SELECTOR PLAN 8
Pt with tachypnea and cough  Pt treated for hypernatremia with fluids    - CXR ordered  - start Duoneb q6h  - RVP panel ordered  - start 20mg laxis  - Bladder scan 2 hours after lasix administration, then again in Pt with tachypnea and cough  Pt treated for hypernatremia with fluids    - CXR ordered  - start Duoneb q6h  - RVP panel ordered  - start 20mg laxis  - Bladder scan 2 hours after lasix

## 2025-07-12 NOTE — PROGRESS NOTE ADULT - PROBLEM SELECTOR PLAN 12
Plan: F: tolerating PO, no IVF  E: replete K<4, Mg<2  N: puree    VTE Prophylaxis: Lovenox  GI: miralax + senna   C: DNR/DNI  D: RMF.

## 2025-07-12 NOTE — PROGRESS NOTE ADULT - PROBLEM SELECTOR PLAN 10
Patient has a history of dementia. A&Ox1 to person only, but daughter states that this is the baseline and denies any deterioration and confusion.    - CTM    #History of cerebral meningioma  - Daughter reports the patient had a brain mass that was removed in 2015

## 2025-07-12 NOTE — PROGRESS NOTE ADULT - SUBJECTIVE AND OBJECTIVE BOX
Physical Medicine and Rehabilitation Progress Note :       Patient is a 89y old  Female who presents with a chief complaint of Rectal bleeding (12 Jul 2025 09:24)      HPI:  89y F with pmh dementia, metastatic breast cancer, hypertension, stage 3a chronic kidney disease, hyperlipidemia, cerebral meningioma, hyponatremia, and vitamin D deficiency brought in by EMS for rectal bleeding.  The patient's daughter reports noticing blood (maroon and dark in nature) in the stool for the past 4 days and denies associated nausea, vomiting, or diarrhea. The daughter reports that the patient had an ulcer on her lower back for 2 weeks and that she occasionally scratches it. The patient hasn't been eating for the past week and has been given ensure and soup by he daughter.  The patient was started on CMF chemotherapy regimen received 3 cycles of treatment  with the last one being on Betina-3-2025. She was scheduled for her next cycle on Betina 15 but was unable to attend to it as she has been having difficulty ambulating and became bedbound since that time,  She had rectal bleeding in 2021 and underwent colonoscopy which showed multiple polyps, all of which were removed.  She follows up with Dr. Tyson Schumacher (PCP), Dr. Poly Keith (med onc), Dr. Jesse Villeda (rad onc), and Dr. Mauri Arroyo (breast surgeon)      In the ED:  Vitals: T 97.9, BP 90/61, HR 96, RR 20, SpO2 98% (RA)  Labs: MCHC 28.6 (H), smudge cells, lactate 2.4, sodium 157, , Cr 1.59, eGFR 30, glucose 124, protein total 9.4 , albumin 2.7, Mg 3.1  Urine: cloudy, protein 30, RBC 99, WBC 7 , moderate leukocyte esterase, few bacteria  CXR: normal  Imaging: CT scan showed no evidence of active gastrointestinal bleeding. Partially visualized left breast mass largest measuring up to 4.7cm, and increase in size  2L fluids (05 Jul 2025 09:01)                            9.0    8.49  )-----------( 330      ( 12 Jul 2025 06:05 )             28.7       07-12    135  |  107  |  45[H]  ----------------------------<  106[H]  3.8   |  17[L]  |  1.03    Ca    7.9[L]      12 Jul 2025 06:05  Phos  4.5     07-12  Mg     1.7     07-12      Vital Signs Last 24 Hrs  T(C): 37 (12 Jul 2025 06:26), Max: 37.1 (11 Jul 2025 11:55)  T(F): 98.6 (12 Jul 2025 06:26), Max: 98.8 (11 Jul 2025 11:55)  HR: 96 (12 Jul 2025 06:26) (96 - 104)  BP: 95/55 (12 Jul 2025 06:26) (86/62 - 119/69)  BP(mean): 62 (11 Jul 2025 22:08) (62 - 86)  RR: 17 (12 Jul 2025 06:26) (17 - 18)  SpO2: 98% (12 Jul 2025 06:26) (95% - 98%)    Parameters below as of 12 Jul 2025 06:26  Patient On (Oxygen Delivery Method): room air        MEDICATIONS  (STANDING):  albuterol/ipratropium for Nebulization 3 milliLiter(s) Nebulizer every 6 hours  dextrose 5%. 1000 milliLiter(s) (100 mL/Hr) IV Continuous <Continuous>  dextrose 5%. 1000 milliLiter(s) (50 mL/Hr) IV Continuous <Continuous>  dextrose 50% Injectable 25 Gram(s) IV Push once  dextrose 50% Injectable 12.5 Gram(s) IV Push once  dextrose 50% Injectable 25 Gram(s) IV Push once  enoxaparin Injectable 40 milliGRAM(s) SubCutaneous every 24 hours  glucagon  Injectable 1 milliGRAM(s) IntraMuscular once  multivitamin 1 Tablet(s) Oral daily  nystatin Powder 1 Application(s) Topical every 12 hours  polyethylene glycol 3350 17 Gram(s) Oral every 12 hours  potassium chloride  10 mEq/100 mL IVPB 10 milliEquivalent(s) IV Intermittent every 1 hour  senna 2 Tablet(s) Oral at bedtime  valACYclovir 1000 milliGRAM(s) Oral two times a day    MEDICATIONS  (PRN):  acetaminophen     Tablet .. 650 milliGRAM(s) Oral every 6 hours PRN Temp greater or equal to 38C (100.4F), Mild Pain (1 - 3)  aluminum hydroxide/magnesium hydroxide/simethicone Suspension 30 milliLiter(s) Oral every 4 hours PRN Dyspepsia  benzonatate 100 milliGRAM(s) Oral three times a day PRN Cough  dextrose Oral Gel 15 Gram(s) Oral once PRN Blood Glucose LESS THAN 70 milliGRAM(s)/deciliter  melatonin 3 milliGRAM(s) Oral at bedtime PRN Insomnia      T(C): 37 (07-12-25 @ 06:26), Max: 37.1 (07-11-25 @ 11:55)  HR: 96 (07-12-25 @ 06:26) (96 - 104)  BP: 95/55 (07-12-25 @ 06:26) (86/62 - 119/69)  RR: 17 (07-12-25 @ 06:26) (17 - 18)  SpO2: 98% (07-12-25 @ 06:26) (95% - 98%)    Physical Exam:   89 y o woman  lying comfortably in semi Rodriguez's position , somnolent ,  NAD     Head: normocephalic , atraumatic    Eyes: PERRLA , EOMI , no nystagmus , sclera anicteric    ENT / FACE: neg nasal discharge , uvula midline , no oropharyngeal erythema / exudate    Neck: supple , negative JVD , negative carotid bruits , no thyromegaly    Chest: CTA bilaterally      Cardiovascular: regular rate and rhythm , neg murmurs / rubs / gallops    Abdomen: soft , non distended , no tenderness to palpation in all 4 quadrants ,  normal bowel sounds     Extremities: WWP , neg cyanosis /clubbing / edema     Neurologic Exam:     somnolent and oriented to person     Cranial Nerves:           II:                         pupils equal , round and reactive to light , visual fields intact         III/ IV/VI:             extraocular movements intact , neg nystagmus , neg ptosis        V:                        facial sensation intact , V1-3 normal        VII:                      face symmetric , no droop , normal eye closure and smile        VIII:                     hearing intact to finger rub bilaterally        IX and X:             no hoarseness , gag intact , palate/ uvula rise symmetrically        XI:                       SCM / trapezius strength intact bilateral        XII:                      no tongue deviation    Motor Exam:        poor effort 2/2 mental status > 3/5 x 4 extremities     Sensation:         withdraws to pinch  x 4 extremities     DTR:           biceps/brachioradialis: equal                            patella/ankle: equal          neg Babinski       7/11/2025  Functional Status Assessment :             PM&R Impression : as above    Current disposition plan recommendation :    subacute rehab placement

## 2025-07-12 NOTE — PROGRESS NOTE ADULT - PROBLEM SELECTOR PLAN 2
- possible etiology: constipation, KUB today with moderate fecal material in colon, miralax and senna ordered  - resolved - possible etiology: constipation, KUB with moderate fecal material in colon, miralax and senna ordered  - resolved

## 2025-07-12 NOTE — PROGRESS NOTE ADULT - ASSESSMENT
I M    89y F with pmh Alzheimer's dementia, metastatic breast cancer, hypertension, stage 3a chronic kidney disease, hyperlipidemia, cerebral meningioma, hyponatremia, and vitamin D deficiency brought in by EMS for rectal bleeding. CTAP showed no GI bleeding. 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 Also with new weakness, CTH showed no acute intracranial pathology. Course complicated by hypernatremia to 163 which resolved with D5 now at 142. MRI brain showing no acute intracranial pathology, and no brain metastasis. MRI lumbar showed L4 - L5 stenosis with no metastasis,        Problem/Plan - 1:  ·  Problem: Weakness.   ·  Plan: - new onset subjective weakness   - non-focal, neuro following   - MRI with/without contrast brain and L spine showing no acute intracranial pathology and no mets to brain or spine. Spine showing stenosis L4 - L5  - NIF/VC ordered PND  - CK, Myasthenia Gravis/Lambert-Eaton Myasthenic Syndrome Evaluation, Acetylcholine Modulating AB, Acetylcholine Blocking, LRP4 Autoantibody Test, Musk antibody, Paraneoplastic panel, ganglioside Ab ordered, PND to be done outpatient as per neuro.    Problem/Plan - 2:  ·  Problem: Urinary retention.   ·  Plan: - possible etiology: constipation, KUB today with moderate fecal material in colon, miralax and senna ordered  - resolved.    Problem/Plan - 3:  ·  Problem: Herpes simplex.   ·  Plan: Decubital ulcer PCR positive for HSV2  - continue valtrex 1g PO BID x 10 days.    Problem/Plan - 4:  ·  Problem: Sacral ulcer.   ·  Plan: Patient's daughter started noticing blood in the stool for the past 4 days while changing her diapers. Initially, GI bleed was suspected, but CT angio of abdomen/pelvis was normal and patient's hgb was 12.1 but on examination patient has an oozing sacral decubitus ulcer on examination. Patient has been having difficulty walking due to leg swelling and has been bed bound recently.  Bleeding is likely 2/2 sacral decubitus ulcer.  HSV 2 positive    - local wound care w/ foam dressing.    Problem/Plan - 5:  ·  Problem: Hypernatremia.  ·  Plan: Patient has a history of CKD stage 3a and hyponatremia (possibly 2/2 SIADH per outpatient nephro note).  Sodium was 157 on admission, went up to 165 after the administration of 2L of NS.  Patient has 4.3L free water deficit  Renal consulted, given 165mL/hr D5  Na downtrending 144 > 142    - resolved.    Problem/Plan - 6:  ·  Problem: Acute kidney injury superimposed on CKD.  ·  Plan: Patient's daughter was told that the patient has CKD but patient never experienced any problems with urination and has appropriate urine output.  On admission: Cr 1.59, , eGFR 30  Now: Cr .87 BUN 34, eGFR 63    - RAFFI resolved.    Problem/Plan - 7:  ·  Problem: Breast cancer.  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 She was deemed as not a candidate for immunotherapy or surgery. She was started on chemotherapy and underwent 3 cycles, last one on 3-June 2025.  Per daughter, patient missed her cycle on Betina-15 due to difficulty ambulation.  She follows up with Dr. Poly Keith (med onc), Dr. Jesse Villeda (rad onc), and Dr. Mauri Arroyo (breast surgeon)    - Palliative consulted, f/u recs  - Dr. Keith and Dr. Arroyo were notified via email.    Problem/Plan - 8:  ·  Problem: Cough.  ·  Plan: Pt with tachypnea and cough  Pt treated for hypernatremia with fluids    - CXR ordered  - start Duoneb q6h  - RVP panel ordered  - start 20mg laxis  - Bladder scan 2 hours after lasix administration, then again in.    Problem/Plan - 9:  ·  Problem: Chronic hyponatremia.   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    - CTM.    Problem/Plan - 10:  ·  Problem: Dementia.   ·  Plan; Patient has a history of dementia. A&Ox1 to person only, but daughter states that this is the baseline and denies any deterioration and confusion.    - CTM    #History of cerebral meningioma  - Daughter reports the patient had a brain mass that was removed in 2015.    Problem/Plan - 11:  ·  Problem: Hypertension.   ·  Plan: Patient has a history of left frontal meningioma that was resected in 2015    Patient has a history of hypertension. She was prescribed valsartan 40mg per surescripts but hasn't been taking it for weeks per daughter.  Blood pressure on admission was 90/61 went up to 137/58 after fluid bolus in the ED.    - hold home valsartan    HLD  Patient has a history of hyperlipidemia. She was prescribed atorvastatin 10mg per surescripts  but hasn't been taking it for weeks per daughter.    - hold home meds.    Problem/Plan - 12:  ·  Problem: Prophylactic measure.   ·  Plan: Plan: F: tolerating PO, no IVF  E: replete K<4, Mg<2  N: puree    VTE Prophylaxis: Lovenox  GI: miralax + senna   C: DNR/DNI  D: RMF.

## 2025-07-12 NOTE — PROGRESS NOTE ADULT - ATTENDING COMMENTS
89y F Shanghainese/Cantonese/limited English, b/l  hearing impairment (better hearing on left ear), with PMHx of Alzheimer's dementia without behavioral disturbance, AAOx1, partial ADL's, gait disturbance (RW assist, was able to walk 3 weeks prior to admission now bed bound for 5 days),hx left frontal craniotomy for cerebral meningioma ( 2015), Obesity (BMI 29.7), HTN, HLD, CKD3a, hx colonic polyps, moderate internal hemorrhoid and rectal prolapse (2021), hx hyponatremia 2/2 SIADH, recently diagnosed stage IIIc (T3N2Mo) triple negative ( ER-/NE-/HER2-) intraductal carcinoma( IDC) of left breast ~ 4.7cm ( 2/2025) followed by Dr.Paul Arroyo/Dr.Bonnie Keith receiving neoadjuvant chemoRx w/ CMF ( cyclophosphamide/methotrexate/5-FU started 4/15/25, s/p cycle 3 on 6/3 out of 8 cycles) now BIBEMS for rectal bleeding likely 2/2 sacral ulcer (? herpetic looking) and found to have ME/FTT with hyponatremia, RAFFI on CKD3a and functional quadriplegia Vs gait abnormality /bed bound for 5 days.-     encounter time 9:30 am  resting, saturating well on room air.  intermittent cough ( some wheezing when coughing   she could not provide interval history, do not endorse any pain, poor po intake  urine output 1.4 L over last 24 hours ( 800 cc after lasix 20 mg iv ) , was given ivf 500 overnight for hypotension ( reported asymptomatic )   sounds congested- rvp negative.  RRR, moving air with some expiratory wheezing, cough with some brochospasm sound.    # rectal bleed likely 2/2 sacral ulcer ( HSV detected)   - MRI lumber spine showed no enhancement of thecal sac, pt is clinically better from ME/corrected hypernatremia and RAFFI  - ID consult appreciated  Dr. Dorsey, agreed to d/c acyclovir and resume valtrex 1g PO bid for total 7 days for HSV sacral ulcer   -  Valtrex 1g PO bid for 10 days ( 7/7-7/16) then reduced to 500mg po bid for HSV ppx if continuing on chemoRx   - Neuro f/u appreicated   - local wound care w/ foam dressing    # Hx Hyponatremia/SIADH  # Hypernatremia ( corrected) -most recent sodium 135 (7/11-  # RAFFI corrected ( likely 2/2 prerenal azotemia Vs ischemic ATN) on CKD3a  # acute urinary retention likely 2/2 constipation( moderate stool burden seen on CT)   - Renal f/u appreciated  sp pinzon -now on purewick catheter.   - bowel regimen for constipation; c/w Senna 2 tabs qHS and miralax 17g PO daily ok  # FTT likely related to chemoRx   # Protein Calorie malnutrition (albumin 2.6-2.7)   - Nutrition consult   - Ensure max protein (Vanilla) bid     # Deconditioning   # Functional quadriplegia   # gait abnormality to rule out Brain mets Vs stroke Vs medical induced neuropathy , doubt Elsberg syndrome   - Neuro consult appreciated   - CTH negative( 7/6)  - MRI brain w/wo contrast and MRI lumber spine w/wo contrast ( 7/7) negative brain/spine mets, no enhancement of thecal sac  - CK 77  - fu on the extensive labs sent for weakness -- so far negative.   - Fall precautions   - PT eval reviewed, required 2 people assistance and unable to full stand  daughter, Yoli whom now agreed to JEREMY placement     # Stage IIIc/T3N2/Mo triple negative IDC L breast ( TNBC)  - undergoing neoadjuvant chemo; CMF s/p 3 cycles ( 4/15, 5/13 and 6/3)    - Breast surgeon Dr. Mauri arroyo, Med Onc Dr. Poly Keith  - Palliative care consult appreciated for [facility]     # Alzheimer's dementia wo behavioral disturbance  - AAOx1  - SLP eval appreciated rec: puree w/ thin   - delirium precaution     # mild tachypena with cough, given nebs - she received 6 L of ivf on admission - given lasix 20 mg iv x 1   cw duanebs q 6 hourly  flonase nasal spray bid  rvp negative 7/12  anti-tussive   # DVT ppx:  Lovenox     # Advance directives: DNR/DNI (MOLST form filled out) [facility]  no heroic measures i.e. CPR or intubation in an event required resuscitation.   Informed Dr. Arroyo and Dr. Keith about admission     Contacts:  legal guardian( daughter): Bozena (Yoli) Tomasa [phone]  PMD Dr.Daniel Schumacher  Breast Surgeon: Dr.Paul Arroyo   Med Onc Dr. Poly Keith   Rad Onc Dr. Jesse Villeda     # Disposition: medically ready, plan for JEREMY placement -waiting for auth     medical team Dr. Luciana Rodrigez, Dr. Vinayak Lugo.

## 2025-07-12 NOTE — PROGRESS NOTE ADULT - SUBJECTIVE AND OBJECTIVE BOX
We had discussed depressive meds such as. Lexapro.   We will Rx this   *****INCOMPLETE NOTE*****    INTERVAL HPI/OVERNIGHT EVENTS:    SUBJECTIVE: Pt seen and examined at bedside. ARA.   Denies f/c/n/v/d, abd pain, SOB, CP,  sxs,     ANTIBIOTICS/RELEVANT:    MEDICATIONS  (STANDING):  albuterol/ipratropium for Nebulization 3 milliLiter(s) Nebulizer every 6 hours  dextrose 5%. 1000 milliLiter(s) (100 mL/Hr) IV Continuous <Continuous>  dextrose 5%. 1000 milliLiter(s) (50 mL/Hr) IV Continuous <Continuous>  dextrose 50% Injectable 25 Gram(s) IV Push once  dextrose 50% Injectable 12.5 Gram(s) IV Push once  dextrose 50% Injectable 25 Gram(s) IV Push once  enoxaparin Injectable 40 milliGRAM(s) SubCutaneous every 24 hours  glucagon  Injectable 1 milliGRAM(s) IntraMuscular once  multivitamin 1 Tablet(s) Oral daily  nystatin Powder 1 Application(s) Topical every 12 hours  polyethylene glycol 3350 17 Gram(s) Oral every 12 hours  potassium chloride  10 mEq/100 mL IVPB 10 milliEquivalent(s) IV Intermittent every 1 hour  senna 2 Tablet(s) Oral at bedtime  valACYclovir 1000 milliGRAM(s) Oral two times a day    MEDICATIONS  (PRN):  acetaminophen     Tablet .. 650 milliGRAM(s) Oral every 6 hours PRN Temp greater or equal to 38C (100.4F), Mild Pain (1 - 3)  aluminum hydroxide/magnesium hydroxide/simethicone Suspension 30 milliLiter(s) Oral every 4 hours PRN Dyspepsia  benzonatate 100 milliGRAM(s) Oral three times a day PRN Cough  dextrose Oral Gel 15 Gram(s) Oral once PRN Blood Glucose LESS THAN 70 milliGRAM(s)/deciliter  melatonin 3 milliGRAM(s) Oral at bedtime PRN Insomnia      Vital Signs Last 24 Hrs  T(C): 37 (12 Jul 2025 06:26), Max: 37.1 (11 Jul 2025 11:55)  T(F): 98.6 (12 Jul 2025 06:26), Max: 98.8 (11 Jul 2025 11:55)  HR: 96 (12 Jul 2025 06:26) (96 - 104)  BP: 95/55 (12 Jul 2025 06:26) (86/62 - 119/69)  BP(mean): 62 (11 Jul 2025 22:08) (62 - 86)  RR: 17 (12 Jul 2025 06:26) (17 - 18)  SpO2: 98% (12 Jul 2025 06:26) (95% - 98%)    Parameters below as of 12 Jul 2025 06:26  Patient On (Oxygen Delivery Method): room air        PHYSICAL EXAM:  General: in no acute distress, non toxic appearing, speaking in full sentences  Eyes: EOMI intact bilaterally. Anicteric sclerae, moist conjunctivae  HENT: Moist mucous membranes  Neck: Trachea midline, supple  Lungs: CTA B/L. No wheezes, rales, or rhonchi  Cardiovascular: RRR. No murmurs, rubs, or gallops  Abdomen: +BS Soft, non-tender non-distended; No rebound or guarding  Vasc: Rad and DP intact and equal b/l   Extremities: WWP, No clubbing, cyanosis or edema  Neurological: Alert and oriented  Skin: Warm and dry. No obvious rash     LABS:                        9.0    8.49  )-----------( 330      ( 12 Jul 2025 06:05 )             28.7     07-12    135  |  107  |  45[H]  ----------------------------<  106[H]  3.8   |  17[L]  |  1.03    Ca    7.9[L]      12 Jul 2025 06:05  Phos  4.5     07-12  Mg     1.7     07-12        Urinalysis Basic - ( 12 Jul 2025 06:05 )    Color: x / Appearance: x / SG: x / pH: x  Gluc: 106 mg/dL / Ketone: x  / Bili: x / Urobili: x   Blood: x / Protein: x / Nitrite: x   Leuk Esterase: x / RBC: x / WBC x   Sq Epi: x / Non Sq Epi: x / Bacteria: x        MICROBIOLOGY:    RADIOLOGY & ADDITIONAL STUDIES: INTERVAL HPI/OVERNIGHT EVENTS:    SUBJECTIVE: Pt seen and examined at bedside. ARA.   Denies f/c/n/v/d, abd pain, SOB, CP,  sxs,     ANTIBIOTICS/RELEVANT:    MEDICATIONS  (STANDING):  albuterol/ipratropium for Nebulization 3 milliLiter(s) Nebulizer every 6 hours  dextrose 5%. 1000 milliLiter(s) (100 mL/Hr) IV Continuous <Continuous>  dextrose 5%. 1000 milliLiter(s) (50 mL/Hr) IV Continuous <Continuous>  dextrose 50% Injectable 25 Gram(s) IV Push once  dextrose 50% Injectable 12.5 Gram(s) IV Push once  dextrose 50% Injectable 25 Gram(s) IV Push once  enoxaparin Injectable 40 milliGRAM(s) SubCutaneous every 24 hours  glucagon  Injectable 1 milliGRAM(s) IntraMuscular once  multivitamin 1 Tablet(s) Oral daily  nystatin Powder 1 Application(s) Topical every 12 hours  polyethylene glycol 3350 17 Gram(s) Oral every 12 hours  potassium chloride  10 mEq/100 mL IVPB 10 milliEquivalent(s) IV Intermittent every 1 hour  senna 2 Tablet(s) Oral at bedtime  valACYclovir 1000 milliGRAM(s) Oral two times a day    MEDICATIONS  (PRN):  acetaminophen     Tablet .. 650 milliGRAM(s) Oral every 6 hours PRN Temp greater or equal to 38C (100.4F), Mild Pain (1 - 3)  aluminum hydroxide/magnesium hydroxide/simethicone Suspension 30 milliLiter(s) Oral every 4 hours PRN Dyspepsia  benzonatate 100 milliGRAM(s) Oral three times a day PRN Cough  dextrose Oral Gel 15 Gram(s) Oral once PRN Blood Glucose LESS THAN 70 milliGRAM(s)/deciliter  melatonin 3 milliGRAM(s) Oral at bedtime PRN Insomnia      Vital Signs Last 24 Hrs  T(C): 37 (12 Jul 2025 06:26), Max: 37.1 (11 Jul 2025 11:55)  T(F): 98.6 (12 Jul 2025 06:26), Max: 98.8 (11 Jul 2025 11:55)  HR: 96 (12 Jul 2025 06:26) (96 - 104)  BP: 95/55 (12 Jul 2025 06:26) (86/62 - 119/69)  BP(mean): 62 (11 Jul 2025 22:08) (62 - 86)  RR: 17 (12 Jul 2025 06:26) (17 - 18)  SpO2: 98% (12 Jul 2025 06:26) (95% - 98%)    Parameters below as of 12 Jul 2025 06:26  Patient On (Oxygen Delivery Method): room air        PHYSICAL EXAM:  General: in no acute distress, non toxic appearing, speaking in full sentences  Eyes: EOMI intact bilaterally. Anicteric sclerae, moist conjunctivae  HENT: Moist mucous membranes  Neck: Trachea midline, supple  Lungs: CTA B/L. No wheezes, rales, or rhonchi  Cardiovascular: RRR. No murmurs, rubs, or gallops  Abdomen: +BS Soft, non-tender non-distended; No rebound or guarding  Vasc: Rad and DP intact and equal b/l   Extremities: WWP, No clubbing, cyanosis or edema  Neurological: Alert and oriented  Skin: Warm and dry. No obvious rash     LABS:                        9.0    8.49  )-----------( 330      ( 12 Jul 2025 06:05 )             28.7     07-12    135  |  107  |  45[H]  ----------------------------<  106[H]  3.8   |  17[L]  |  1.03    Ca    7.9[L]      12 Jul 2025 06:05  Phos  4.5     07-12  Mg     1.7     07-12        Urinalysis Basic - ( 12 Jul 2025 06:05 )    Color: x / Appearance: x / SG: x / pH: x  Gluc: 106 mg/dL / Ketone: x  / Bili: x / Urobili: x   Blood: x / Protein: x / Nitrite: x   Leuk Esterase: x / RBC: x / WBC x   Sq Epi: x / Non Sq Epi: x / Bacteria: x        MICROBIOLOGY:    RADIOLOGY & ADDITIONAL STUDIES:

## 2025-07-13 LAB
ALBUMIN SERPL ELPH-MCNC: 2.1 G/DL — LOW (ref 3.3–5)
ALP SERPL-CCNC: 84 U/L — SIGNIFICANT CHANGE UP (ref 40–120)
ALT FLD-CCNC: 25 U/L — SIGNIFICANT CHANGE UP (ref 10–45)
ANION GAP SERPL CALC-SCNC: 11 MMOL/L — SIGNIFICANT CHANGE UP (ref 5–17)
AST SERPL-CCNC: 30 U/L — SIGNIFICANT CHANGE UP (ref 10–40)
BASOPHILS # BLD AUTO: 0.01 K/UL — SIGNIFICANT CHANGE UP (ref 0–0.2)
BASOPHILS NFR BLD AUTO: 0.1 % — SIGNIFICANT CHANGE UP (ref 0–2)
BILIRUB SERPL-MCNC: 0.3 MG/DL — SIGNIFICANT CHANGE UP (ref 0.2–1.2)
BUN SERPL-MCNC: 45 MG/DL — HIGH (ref 7–23)
CALCIUM SERPL-MCNC: 7.9 MG/DL — LOW (ref 8.4–10.5)
CHLORIDE SERPL-SCNC: 109 MMOL/L — HIGH (ref 96–108)
CO2 SERPL-SCNC: 16 MMOL/L — LOW (ref 22–31)
CREAT SERPL-MCNC: 0.91 MG/DL — SIGNIFICANT CHANGE UP (ref 0.5–1.3)
EGFR: 60 ML/MIN/1.73M2 — SIGNIFICANT CHANGE UP
EGFR: 60 ML/MIN/1.73M2 — SIGNIFICANT CHANGE UP
EOSINOPHIL # BLD AUTO: 0.88 K/UL — HIGH (ref 0–0.5)
EOSINOPHIL NFR BLD AUTO: 11.9 % — HIGH (ref 0–6)
GLUCOSE SERPL-MCNC: 124 MG/DL — HIGH (ref 70–99)
HCT VFR BLD CALC: 24.6 % — LOW (ref 34.5–45)
HGB BLD-MCNC: 7.7 G/DL — LOW (ref 11.5–15.5)
IMM GRANULOCYTES # BLD AUTO: 0.03 K/UL — SIGNIFICANT CHANGE UP (ref 0–0.07)
IMM GRANULOCYTES NFR BLD AUTO: 0.4 % — SIGNIFICANT CHANGE UP (ref 0–0.9)
LYMPHOCYTES # BLD AUTO: 0.79 K/UL — LOW (ref 1–3.3)
LYMPHOCYTES NFR BLD AUTO: 10.7 % — LOW (ref 13–44)
MAGNESIUM SERPL-MCNC: 2 MG/DL — SIGNIFICANT CHANGE UP (ref 1.6–2.6)
MCHC RBC-ENTMCNC: 28.1 PG — SIGNIFICANT CHANGE UP (ref 27–34)
MCHC RBC-ENTMCNC: 31.3 G/DL — LOW (ref 32–36)
MCV RBC AUTO: 89.8 FL — SIGNIFICANT CHANGE UP (ref 80–100)
MONOCYTES # BLD AUTO: 0.62 K/UL — SIGNIFICANT CHANGE UP (ref 0–0.9)
MONOCYTES NFR BLD AUTO: 8.4 % — SIGNIFICANT CHANGE UP (ref 2–14)
NEUTROPHILS # BLD AUTO: 5.04 K/UL — SIGNIFICANT CHANGE UP (ref 1.8–7.4)
NEUTROPHILS NFR BLD AUTO: 68.5 % — SIGNIFICANT CHANGE UP (ref 43–77)
NRBC # BLD AUTO: 0 K/UL — SIGNIFICANT CHANGE UP (ref 0–0)
NRBC # FLD: 0 K/UL — SIGNIFICANT CHANGE UP (ref 0–0)
NRBC BLD AUTO-RTO: 0 /100 WBCS — SIGNIFICANT CHANGE UP (ref 0–0)
PHOSPHATE SERPL-MCNC: 3.4 MG/DL — SIGNIFICANT CHANGE UP (ref 2.5–4.5)
PLATELET # BLD AUTO: 338 K/UL — SIGNIFICANT CHANGE UP (ref 150–400)
PMV BLD: 10 FL — SIGNIFICANT CHANGE UP (ref 7–13)
POTASSIUM SERPL-MCNC: 3.8 MMOL/L — SIGNIFICANT CHANGE UP (ref 3.5–5.3)
POTASSIUM SERPL-SCNC: 3.8 MMOL/L — SIGNIFICANT CHANGE UP (ref 3.5–5.3)
PROT SERPL-MCNC: 6.9 G/DL — SIGNIFICANT CHANGE UP (ref 6–8.3)
RBC # BLD: 2.74 M/UL — LOW (ref 3.8–5.2)
RBC # FLD: 20 % — HIGH (ref 10.3–14.5)
SODIUM SERPL-SCNC: 136 MMOL/L — SIGNIFICANT CHANGE UP (ref 135–145)
WBC # BLD: 7.37 K/UL — SIGNIFICANT CHANGE UP (ref 3.8–10.5)
WBC # FLD AUTO: 7.37 K/UL — SIGNIFICANT CHANGE UP (ref 3.8–10.5)

## 2025-07-13 PROCEDURE — 99232 SBSQ HOSP IP/OBS MODERATE 35: CPT

## 2025-07-13 RX ADMIN — IPRATROPIUM BROMIDE AND ALBUTEROL SULFATE 3 MILLILITER(S): .5; 2.5 SOLUTION RESPIRATORY (INHALATION) at 04:20

## 2025-07-13 RX ADMIN — Medication 650 MILLIGRAM(S): at 14:26

## 2025-07-13 RX ADMIN — ENOXAPARIN SODIUM 40 MILLIGRAM(S): 100 INJECTION SUBCUTANEOUS at 17:05

## 2025-07-13 RX ADMIN — NYSTATIN 1 APPLICATION(S): 100000 CREAM TOPICAL at 05:58

## 2025-07-13 RX ADMIN — POLYETHYLENE GLYCOL 3350 17 GRAM(S): 17 POWDER, FOR SOLUTION ORAL at 05:58

## 2025-07-13 RX ADMIN — IPRATROPIUM BROMIDE AND ALBUTEROL SULFATE 3 MILLILITER(S): .5; 2.5 SOLUTION RESPIRATORY (INHALATION) at 17:06

## 2025-07-13 RX ADMIN — IPRATROPIUM BROMIDE AND ALBUTEROL SULFATE 3 MILLILITER(S): .5; 2.5 SOLUTION RESPIRATORY (INHALATION) at 21:50

## 2025-07-13 RX ADMIN — Medication 2 TABLET(S): at 21:50

## 2025-07-13 RX ADMIN — Medication 1000 MILLIGRAM(S): at 17:06

## 2025-07-13 RX ADMIN — Medication 650 MILLIGRAM(S): at 15:26

## 2025-07-13 RX ADMIN — NYSTATIN 1 APPLICATION(S): 100000 CREAM TOPICAL at 17:07

## 2025-07-13 RX ADMIN — FLUTICASONE PROPIONATE 1 SPRAY(S): 50 SPRAY, METERED NASAL at 05:58

## 2025-07-13 RX ADMIN — Medication 1000 MILLIGRAM(S): at 05:58

## 2025-07-13 RX ADMIN — Medication 1 TABLET(S): at 11:35

## 2025-07-13 RX ADMIN — FLUTICASONE PROPIONATE 1 SPRAY(S): 50 SPRAY, METERED NASAL at 00:15

## 2025-07-13 RX ADMIN — IPRATROPIUM BROMIDE AND ALBUTEROL SULFATE 3 MILLILITER(S): .5; 2.5 SOLUTION RESPIRATORY (INHALATION) at 10:23

## 2025-07-13 NOTE — PROGRESS NOTE ADULT - SUBJECTIVE AND OBJECTIVE BOX
RODRIGO VILLALTA , [id number],  [facility] [id number] [id number]    Time of encounter : 11 am  awake, alert   saturating well on room air.  still with some cough   Cantonese  # [id number]     do not endorse pain  encouraged to eat more - stated she is full.   moving all limbs.     T(C): 36.6 (07-13-25 @ 11:52), Max: 37.1 (07-12-25 @ 20:20)  HR: 105 (07-13-25 @ 11:52) (95 - 105)  BP: 105/65 (07-13-25 @ 11:52) (97/59 - 112/67)  RR: 24 (07-13-25 @ 11:52) (18 - 24)  SpO2: 96% (07-13-25 @ 11:52) (95% - 96%)    acetaminophen     Tablet .. 650 milliGRAM(s) Oral every 6 hours PRN  albuterol/ipratropium for Nebulization 3 milliLiter(s) Nebulizer every 6 hours  aluminum hydroxide/magnesium hydroxide/simethicone Suspension 30 milliLiter(s) Oral every 4 hours PRN  benzonatate 100 milliGRAM(s) Oral three times a day PRN  dextrose 5%. 1000 milliLiter(s) IV Continuous <Continuous>  dextrose 5%. 1000 milliLiter(s) IV Continuous <Continuous>  dextrose 50% Injectable 25 Gram(s) IV Push once  dextrose 50% Injectable 12.5 Gram(s) IV Push once  dextrose 50% Injectable 25 Gram(s) IV Push once  dextrose Oral Gel 15 Gram(s) Oral once PRN  enoxaparin Injectable 40 milliGRAM(s) SubCutaneous every 24 hours  fluticasone propionate 50 MICROgram(s)/spray Nasal Spray 1 Spray(s) Both Nostrils two times a day  glucagon  Injectable 1 milliGRAM(s) IntraMuscular once  melatonin 3 milliGRAM(s) Oral at bedtime PRN  multivitamin 1 Tablet(s) Oral daily  nystatin Powder 1 Application(s) Topical every 12 hours  polyethylene glycol 3350 17 Gram(s) Oral every 12 hours  senna 2 Tablet(s) Oral at bedtime  valACYclovir 1000 milliGRAM(s) Oral two times a day      Physical Exam :    General exam : awake, alert, moving all imbs.    CVS : RRR no murmur,  Lungs : good air entry bilaterally   Abdomen : BS present, soft, not tender, not distended.  Extremities: no edema, no tenderness.  Neuro : non focal.  Skin : warm and dry.   :  no pinzon.      CBC Full  -  ( 13 Jul 2025 07:08 )  WBC Count : 7.37 K/uL  RBC Count : 2.74 M/uL  Hemoglobin : 7.7 g/dL  Hematocrit : 24.6 %  Platelet Count - Automated : 338 K/uL  Mean Cell Volume : 89.8 fl  Mean Cell Hemoglobin : 28.1 pg  Mean Cell Hemoglobin Concentration : 31.3 g/dL  Auto Neutrophil # : 5.04 K/uL  Auto Lymphocyte # : 0.79 K/uL  Auto Monocyte # : 0.62 K/uL  Auto Eosinophil # : 0.88 K/uL  Auto Basophil # : 0.01 K/uL  Auto Neutrophil % : 68.5 %  Auto Lymphocyte % : 10.7 %  Auto Monocyte % : 8.4 %  Auto Eosinophil % : 11.9 %  Auto Basophil % : 0.1 %        07-13    136  |  109[H]  |  45[H]  ----------------------------<  124[H]  3.8   |  16[L]  |  0.91    Ca    7.9[L]      13 Jul 2025 07:08  Phos  3.4     07-13  Mg     2.0     07-13    TPro  6.9  /  Alb  2.1[L]  /  TBili  0.3  /  DBili  x   /  AST  30  /  ALT  25  /  AlkPhos  84  07-13    Daily     Daily   CAPILLARY BLOOD GLUCOSE      POCT Blood Glucose.: 99 mg/dL (12 Jul 2025 13:51)      Urinalysis Basic - ( 13 Jul 2025 07:08 )    Color: x / Appearance: x / SG: x / pH: x  Gluc: 124 mg/dL / Ketone: x  / Bili: x / Urobili: x   Blood: x / Protein: x / Nitrite: x   Leuk Esterase: x / RBC: x / WBC x   Sq Epi: x / Non Sq Epi: x / Bacteria: x

## 2025-07-13 NOTE — PROGRESS NOTE ADULT - ATTENDING COMMENTS
89y F Shanghainese/Cantonese/limited English, b/l  hearing impairment (better hearing on left ear), with PMHx of Alzheimer's dementia without behavioral disturbance, AAOx1, partial ADL's, gait disturbance (RW assist, was able to walk 3 weeks prior to admission now bed bound for 5 days),hx left frontal craniotomy for cerebral meningioma ( 2015), Obesity (BMI 29.7), HTN, HLD, CKD3a, hx colonic polyps, moderate internal hemorrhoid and rectal prolapse (2021), hx hyponatremia 2/2 SIADH, recently diagnosed stage IIIc (T3N2Mo) triple negative ( ER-/ND-/HER2-) intraductal carcinoma( IDC) of left breast ~ 4.7cm ( 2/2025) followed by Dr.Paul Arroyo/Dr.Bonnie Keith receiving neoadjuvant chemoRx w/ CMF ( cyclophosphamide/methotrexate/5-FU started 4/15/25, s/p cycle 3 on 6/3 out of 8 cycles) now BIBEMS for rectal bleeding likely 2/2 sacral ulcer (? herpetic looking) and found to have ME/FTT with hyponatremia, RAFFI on CKD3a and functional quadriplegia Vs gait abnormality /bed bound for 5 days.-       # rectal bleed likely 2/2 sacral ulcer ( HSV detected)   - MRI lumber spine showed no enhancement of thecal sac, pt is clinically better from ME/corrected hypernatremia and RAFFI  - ID consult appreciated  Dr. Dorsey, agreed to d/c acyclovir and resume valtrex 1g PO bid for total 7 days for HSV sacral ulcer   -  Valtrex 1g PO bid for 10 days ( 7/7-7/16) then reduced to 500mg po bid for HSV ppx if continuing on chemoRx   - Neuro f/u appreicated   - local wound care w/ foam dressing    # Hx Hyponatremia/SIADH  # Hypernatremia ( corrected) -most recent sodium 135 (7/11-  # RAFFI corrected ( likely 2/2 prerenal azotemia Vs ischemic ATN) on CKD3a  # acute urinary retention likely 2/2 constipation( moderate stool burden seen on CT)   - Renal f/u appreciated  sp pinzon -now on purewick catheter.   voiding well.   - bowel regimen for constipation; c/w Senna 2 tabs qHS and miralax 17g PO daily ok  # FTT likely related to chemoRx   # Protein Calorie malnutrition (albumin 2.6-2.7)   - Nutrition consult   - Ensure max protein (Vanilla) bid     # Deconditioning   # Functional quadriplegia   # gait abnormality to rule out Brain mets Vs stroke Vs medical induced neuropathy , doubt Elsberg syndrome   - Neuro consult appreciated   - CTH negative( 7/6)  - MRI brain w/wo contrast and MRI lumber spine w/wo contrast ( 7/7) negative brain/spine mets, no enhancement of thecal sac  - CK 77  - fu on the extensive labs sent for weakness -- so far negative.   - Fall precautions   - PT eval reviewed, required 2 people assistance and unable to full stand  daughter, Yoli whom now agreed to JEREMY placement     # Stage IIIc/T3N2/Mo triple negative IDC L breast ( TNBC)  - undergoing neoadjuvant chemo; CMF s/p 3 cycles ( 4/15, 5/13 and 6/3)    - Breast surgeon Dr. Mauri arroyo, Med Onc Dr. Poly Keith  - Palliative care consult appreciated for [facility]     # Alzheimer's dementia wo behavioral disturbance  - AAOx1  - SLP eval appreciated rec: puree w/ thin   - delirium precaution     # mild tachypena with cough, given nebs - she received 6 L of ivf on admission - given lasix 20 mg iv x 1   cw duanebs q 6 hourly  flonase nasal spray bid  rvp negative 7/12  anti-tussive   7/13- no more tachypneic , saturating well on room air, continue with flonase bid/ bibi-neb q 6 hourly -helping.   # DVT ppx:  Lovenox     # Advance directives: DNR/DNI (MOLST form filled out) [facility]  no heroic measures i.e. CPR or intubation in an event required resuscitation.   Informed Dr. Arroyo and Dr. Keith about admission     Contacts:  legal guardian( daughter): Bozena (Yoli) Tomasa [phone]  PMD Dr.Daniel Schumacher  Breast Surgeon: Dr.Paul Arroyo   Med Onc Dr. Poly Keith   Rad Onc Dr. Jesse Villeda     # Disposition: medically ready, plan for JEREMY placement -waiting for auth     medical team

## 2025-07-14 LAB
ALBUMIN SERPL ELPH-MCNC: 2.1 G/DL — LOW (ref 3.3–5)
ALP SERPL-CCNC: 102 U/L — SIGNIFICANT CHANGE UP (ref 40–120)
ALT FLD-CCNC: 24 U/L — SIGNIFICANT CHANGE UP (ref 10–45)
ANION GAP SERPL CALC-SCNC: 11 MMOL/L — SIGNIFICANT CHANGE UP (ref 5–17)
AST SERPL-CCNC: 33 U/L — SIGNIFICANT CHANGE UP (ref 10–40)
BASOPHILS # BLD AUTO: 0.02 K/UL — SIGNIFICANT CHANGE UP (ref 0–0.2)
BASOPHILS NFR BLD AUTO: 0.3 % — SIGNIFICANT CHANGE UP (ref 0–2)
BILIRUB SERPL-MCNC: 0.3 MG/DL — SIGNIFICANT CHANGE UP (ref 0.2–1.2)
BUN SERPL-MCNC: 41 MG/DL — HIGH (ref 7–23)
CALCIUM SERPL-MCNC: 8.5 MG/DL — SIGNIFICANT CHANGE UP (ref 8.4–10.5)
CHLORIDE SERPL-SCNC: 109 MMOL/L — HIGH (ref 96–108)
CO2 SERPL-SCNC: 18 MMOL/L — LOW (ref 22–31)
CREAT SERPL-MCNC: 0.87 MG/DL — SIGNIFICANT CHANGE UP (ref 0.5–1.3)
EGFR: 63 ML/MIN/1.73M2 — SIGNIFICANT CHANGE UP
EGFR: 63 ML/MIN/1.73M2 — SIGNIFICANT CHANGE UP
EOSINOPHIL # BLD AUTO: 0.97 K/UL — HIGH (ref 0–0.5)
EOSINOPHIL NFR BLD AUTO: 15.6 % — HIGH (ref 0–6)
GLUCOSE SERPL-MCNC: 95 MG/DL — SIGNIFICANT CHANGE UP (ref 70–99)
HCT VFR BLD CALC: 27.5 % — LOW (ref 34.5–45)
HGB BLD-MCNC: 8.3 G/DL — LOW (ref 11.5–15.5)
IMM GRANULOCYTES # BLD AUTO: 0.03 K/UL — SIGNIFICANT CHANGE UP (ref 0–0.07)
IMM GRANULOCYTES NFR BLD AUTO: 0.5 % — SIGNIFICANT CHANGE UP (ref 0–0.9)
LYMPHOCYTES # BLD AUTO: 0.77 K/UL — LOW (ref 1–3.3)
LYMPHOCYTES NFR BLD AUTO: 12.4 % — LOW (ref 13–44)
MAGNESIUM SERPL-MCNC: 2 MG/DL — SIGNIFICANT CHANGE UP (ref 1.6–2.6)
MCHC RBC-ENTMCNC: 27.9 PG — SIGNIFICANT CHANGE UP (ref 27–34)
MCHC RBC-ENTMCNC: 30.2 G/DL — LOW (ref 32–36)
MCV RBC AUTO: 92.6 FL — SIGNIFICANT CHANGE UP (ref 80–100)
MONOCYTES # BLD AUTO: 0.63 K/UL — SIGNIFICANT CHANGE UP (ref 0–0.9)
MONOCYTES NFR BLD AUTO: 10.1 % — SIGNIFICANT CHANGE UP (ref 2–14)
NEUTROPHILS # BLD AUTO: 3.8 K/UL — SIGNIFICANT CHANGE UP (ref 1.8–7.4)
NEUTROPHILS NFR BLD AUTO: 61.1 % — SIGNIFICANT CHANGE UP (ref 43–77)
NRBC # BLD AUTO: 0 K/UL — SIGNIFICANT CHANGE UP (ref 0–0)
NRBC # FLD: 0 K/UL — SIGNIFICANT CHANGE UP (ref 0–0)
NRBC BLD AUTO-RTO: 0 /100 WBCS — SIGNIFICANT CHANGE UP (ref 0–0)
PHOSPHATE SERPL-MCNC: 3.5 MG/DL — SIGNIFICANT CHANGE UP (ref 2.5–4.5)
PLATELET # BLD AUTO: 385 K/UL — SIGNIFICANT CHANGE UP (ref 150–400)
PMV BLD: 10.2 FL — SIGNIFICANT CHANGE UP (ref 7–13)
POTASSIUM SERPL-MCNC: 4 MMOL/L — SIGNIFICANT CHANGE UP (ref 3.5–5.3)
POTASSIUM SERPL-SCNC: 4 MMOL/L — SIGNIFICANT CHANGE UP (ref 3.5–5.3)
PROT SERPL-MCNC: 7.3 G/DL — SIGNIFICANT CHANGE UP (ref 6–8.3)
RBC # BLD: 2.97 M/UL — LOW (ref 3.8–5.2)
RBC # FLD: 20.3 % — HIGH (ref 10.3–14.5)
SODIUM SERPL-SCNC: 138 MMOL/L — SIGNIFICANT CHANGE UP (ref 135–145)
WBC # BLD: 6.22 K/UL — SIGNIFICANT CHANGE UP (ref 3.8–10.5)
WBC # FLD AUTO: 6.22 K/UL — SIGNIFICANT CHANGE UP (ref 3.8–10.5)

## 2025-07-14 PROCEDURE — 97110 THERAPEUTIC EXERCISES: CPT

## 2025-07-14 PROCEDURE — 72158 MRI LUMBAR SPINE W/O & W/DYE: CPT

## 2025-07-14 PROCEDURE — 84295 ASSAY OF SERUM SODIUM: CPT

## 2025-07-14 PROCEDURE — 87040 BLOOD CULTURE FOR BACTERIA: CPT

## 2025-07-14 PROCEDURE — 84156 ASSAY OF PROTEIN URINE: CPT

## 2025-07-14 PROCEDURE — 36415 COLL VENOUS BLD VENIPUNCTURE: CPT

## 2025-07-14 PROCEDURE — 83735 ASSAY OF MAGNESIUM: CPT

## 2025-07-14 PROCEDURE — 80053 COMPREHEN METABOLIC PANEL: CPT

## 2025-07-14 PROCEDURE — 83036 HEMOGLOBIN GLYCOSYLATED A1C: CPT

## 2025-07-14 PROCEDURE — 85730 THROMBOPLASTIN TIME PARTIAL: CPT

## 2025-07-14 PROCEDURE — 84100 ASSAY OF PHOSPHORUS: CPT

## 2025-07-14 PROCEDURE — 97161 PT EVAL LOW COMPLEX 20 MIN: CPT

## 2025-07-14 PROCEDURE — 84540 ASSAY OF URINE/UREA-N: CPT

## 2025-07-14 PROCEDURE — 0225U NFCT DS DNA&RNA 21 SARSCOV2: CPT

## 2025-07-14 PROCEDURE — 84132 ASSAY OF SERUM POTASSIUM: CPT

## 2025-07-14 PROCEDURE — 87798 DETECT AGENT NOS DNA AMP: CPT

## 2025-07-14 PROCEDURE — A9585: CPT

## 2025-07-14 PROCEDURE — 83516 IMMUNOASSAY NONANTIBODY: CPT

## 2025-07-14 PROCEDURE — 86255 FLUORESCENT ANTIBODY SCREEN: CPT

## 2025-07-14 PROCEDURE — 97165 OT EVAL LOW COMPLEX 30 MIN: CPT

## 2025-07-14 PROCEDURE — 74174 CTA ABD&PLVS W/CONTRAST: CPT

## 2025-07-14 PROCEDURE — 94640 AIRWAY INHALATION TREATMENT: CPT

## 2025-07-14 PROCEDURE — 70450 CT HEAD/BRAIN W/O DYE: CPT

## 2025-07-14 PROCEDURE — 74018 RADEX ABDOMEN 1 VIEW: CPT

## 2025-07-14 PROCEDURE — 87529 HSV DNA AMP PROBE: CPT

## 2025-07-14 PROCEDURE — 82803 BLOOD GASES ANY COMBINATION: CPT

## 2025-07-14 PROCEDURE — 82330 ASSAY OF CALCIUM: CPT

## 2025-07-14 PROCEDURE — 85025 COMPLETE CBC W/AUTO DIFF WBC: CPT

## 2025-07-14 PROCEDURE — 83690 ASSAY OF LIPASE: CPT

## 2025-07-14 PROCEDURE — 86366 MUSCLE-SPECIFIC KINASE ANTB: CPT

## 2025-07-14 PROCEDURE — 87086 URINE CULTURE/COLONY COUNT: CPT

## 2025-07-14 PROCEDURE — 71045 X-RAY EXAM CHEST 1 VIEW: CPT

## 2025-07-14 PROCEDURE — 85027 COMPLETE CBC AUTOMATED: CPT

## 2025-07-14 PROCEDURE — 80048 BASIC METABOLIC PNL TOTAL CA: CPT

## 2025-07-14 PROCEDURE — 81001 URINALYSIS AUTO W/SCOPE: CPT

## 2025-07-14 PROCEDURE — 82962 GLUCOSE BLOOD TEST: CPT

## 2025-07-14 PROCEDURE — 86900 BLOOD TYPING SEROLOGIC ABO: CPT

## 2025-07-14 PROCEDURE — 97116 GAIT TRAINING THERAPY: CPT

## 2025-07-14 PROCEDURE — 86042 ACETYLCHOLN RCPTR BLCKG ANTB: CPT

## 2025-07-14 PROCEDURE — 84133 ASSAY OF URINE POTASSIUM: CPT

## 2025-07-14 PROCEDURE — 84300 ASSAY OF URINE SODIUM: CPT

## 2025-07-14 PROCEDURE — 86850 RBC ANTIBODY SCREEN: CPT

## 2025-07-14 PROCEDURE — 82550 ASSAY OF CK (CPK): CPT

## 2025-07-14 PROCEDURE — 82570 ASSAY OF URINE CREATININE: CPT

## 2025-07-14 PROCEDURE — 83605 ASSAY OF LACTIC ACID: CPT

## 2025-07-14 PROCEDURE — 86043 ACETYLCHOLN RCPTR MODLG ANTB: CPT

## 2025-07-14 PROCEDURE — 86901 BLOOD TYPING SEROLOGIC RH(D): CPT

## 2025-07-14 PROCEDURE — 70553 MRI BRAIN STEM W/O & W/DYE: CPT

## 2025-07-14 PROCEDURE — 99232 SBSQ HOSP IP/OBS MODERATE 35: CPT

## 2025-07-14 PROCEDURE — 85610 PROTHROMBIN TIME: CPT

## 2025-07-14 PROCEDURE — 83930 ASSAY OF BLOOD OSMOLALITY: CPT

## 2025-07-14 RX ORDER — DEXTROMETHORPHAN HBR, GUAIFENESIN 200 MG/10ML
100 LIQUID ORAL EVERY 6 HOURS
Refills: 0 | Status: DISCONTINUED | OUTPATIENT
Start: 2025-07-14 | End: 2025-07-16

## 2025-07-14 RX ADMIN — IPRATROPIUM BROMIDE AND ALBUTEROL SULFATE 3 MILLILITER(S): .5; 2.5 SOLUTION RESPIRATORY (INHALATION) at 21:55

## 2025-07-14 RX ADMIN — DEXTROMETHORPHAN HBR, GUAIFENESIN 100 MILLIGRAM(S): 200 LIQUID ORAL at 16:39

## 2025-07-14 RX ADMIN — IPRATROPIUM BROMIDE AND ALBUTEROL SULFATE 3 MILLILITER(S): .5; 2.5 SOLUTION RESPIRATORY (INHALATION) at 10:53

## 2025-07-14 RX ADMIN — NYSTATIN 1 APPLICATION(S): 100000 CREAM TOPICAL at 18:08

## 2025-07-14 RX ADMIN — ENOXAPARIN SODIUM 40 MILLIGRAM(S): 100 INJECTION SUBCUTANEOUS at 15:22

## 2025-07-14 RX ADMIN — NYSTATIN 1 APPLICATION(S): 100000 CREAM TOPICAL at 06:19

## 2025-07-14 RX ADMIN — Medication 1000 MILLIGRAM(S): at 06:19

## 2025-07-14 RX ADMIN — FLUTICASONE PROPIONATE 1 SPRAY(S): 50 SPRAY, METERED NASAL at 06:18

## 2025-07-14 RX ADMIN — Medication 1000 MILLIGRAM(S): at 17:57

## 2025-07-14 RX ADMIN — Medication 1 TABLET(S): at 12:51

## 2025-07-14 RX ADMIN — IPRATROPIUM BROMIDE AND ALBUTEROL SULFATE 3 MILLILITER(S): .5; 2.5 SOLUTION RESPIRATORY (INHALATION) at 15:23

## 2025-07-14 RX ADMIN — FLUTICASONE PROPIONATE 1 SPRAY(S): 50 SPRAY, METERED NASAL at 17:58

## 2025-07-14 RX ADMIN — POLYETHYLENE GLYCOL 3350 17 GRAM(S): 17 POWDER, FOR SOLUTION ORAL at 06:19

## 2025-07-14 NOTE — PROGRESS NOTE ADULT - PROBLEM SELECTOR PLAN 1
- new onset subjective weakness   - non-focal, neuro following   - MRI with/without contrast brain and L spine showing no acute intracranial pathology and no mets to brain or spine. Spine showing stenosis L4 - L5  - NIF/VC ordered PND  - CK, Myasthenia Gravis/Lambert-Eaton Myasthenic Syndrome Evaluation, Acetylcholine Modulating AB, Acetylcholine Blocking, LRP4 Autoantibody Test, Musk antibody, Paraneoplastic panel, ganglioside Ab ordered, PND to be done outpatient as per neuro Patient's daughter started noticing blood in the stool for the past 4 days while changing her diapers. Initially, GI bleed was suspected, but CT angio of abdomen/pelvis was normal and patient's hgb was 12.1 but on examination patient has an oozing sacral decubitus ulcer on examination. Patient has been having difficulty walking due to leg swelling and has been bed bound recently.  Bleeding is likely 2/2 sacral decubitus ulcer.  HSV 2 positive    - local wound care w/ foam dressing

## 2025-07-14 NOTE — PROGRESS NOTE ADULT - PROBLEM SELECTOR PLAN 2
- possible etiology: constipation, KUB with moderate fecal material in colon, miralax and senna ordered  - resolved - possible etiology: constipation, KUB with moderate fecal material in colon, miralax and senna ordered  - resolved  - s/p pinzon now on purewick catheter voiding well Decubital ulcer PCR positive for HSV2  - continue valtrex 1g PO BID x 10 days (7/7 - 7/16) then reduced to 500mg PO BID for HSV ppx if continuing on chemoRx

## 2025-07-14 NOTE — PROGRESS NOTE ADULT - PROBLEM SELECTOR PLAN 8
Pt with tachypnea and cough  Pt treated for hypernatremia with fluids    - CXR ordered  - start Duoneb q6h  - RVP panel ordered  - start 20mg laxis  - Bladder scan 2 hours after lasix Pt with mild tachypnea and cough  Pt treated for hypernatremia with fluids, lasix 20mg IV given x 1  No more tachypnea    - c/w Duoneb q6h  - c/w flonase nasal spray  - rvp negative 7/12  - RVP panel ordered

## 2025-07-14 NOTE — PROGRESS NOTE ADULT - PROBLEM SELECTOR PLAN 4
Patient's daughter started noticing blood in the stool for the past 4 days while changing her diapers. Initially, GI bleed was suspected, but CT angio of abdomen/pelvis was normal and patient's hgb was 12.1 but on examination patient has an oozing sacral decubitus ulcer on examination. Patient has been having difficulty walking due to leg swelling and has been bed bound recently.  Bleeding is likely 2/2 sacral decubitus ulcer.  HSV 2 positive    - local wound care w/ foam dressing - possible etiology: constipation, KUB with moderate fecal material in colon, miralax and senna ordered  - resolved  - s/p pinzon now on purewick catheter voiding well

## 2025-07-14 NOTE — PROGRESS NOTE ADULT - ATTENDING COMMENTS
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 herpetic looking) and found to have ME/FTT with hyponatremia, RAFFI on CKD3a and functional quadriplegia Vs gait abnormality /bed bound for 5 days.-     multiple   # rectal bleed likely 2/2 sacral ulcer ( HSV detected)   - MRI lumber spine showed no enhancement of thecal sac, pt is clinically better from ME/corrected hypernatremia and RAFFI  - ID consult appreciated  Dr. Dorsey, agreed to d/c acyclovir and resume valtrex 1g PO bid for total 7 days for HSV sacral ulcer   -  Valtrex 1g PO bid for 10 days ( 7/7-7/16) then reduced to 500mg po bid for HSV ppx if continuing on chemoRx   - Neuro f/u appreicated   - local wound care w/ foam dressing    # Hx Hyponatremia/SIADH  # Hypernatremia ( corrected) -most recent sodium 135 (7/11-  # RAFFI corrected ( likely 2/2 prerenal azotemia Vs ischemic ATN) on CKD3a  # acute urinary retention likely 2/2 constipation( moderate stool burden seen on CT)   - Renal f/u appreciated  sp pinzon -now on purewick catheter.   voiding well.   - bowel regimen for constipation; c/w Senna 2 tabs qHS and miralax 17g PO daily ok  # FTT likely related to chemoRx   # Protein Calorie malnutrition (albumin 2.6-2.7)   - Nutrition consult   - Ensure max protein (Vanilla) bid     # Deconditioning   # Functional quadriplegia   # gait abnormality to rule out Brain mets Vs stroke Vs medical induced neuropathy , doubt Elsberg syndrome   - Neuro consult appreciated   - CTH negative( 7/6)  - MRI brain w/wo contrast and MRI lumber spine w/wo contrast ( 7/7) negative brain/spine mets, no enhancement of thecal sac  - CK 77  - fu on the extensive labs sent for weakness -- so far negative.   - Fall precautions   - PT eval reviewed, required 2 people assistance and unable to full stand  daughter, Yoli whom now agreed to JEREMY placement     # Stage IIIc/T3N2/Mo triple negative IDC L breast ( TNBC)  - undergoing neoadjuvant chemo; CMF s/p 3 cycles ( 4/15, 5/13 and 6/3)    - Breast surgeon Dr. Mauri arroyo, Med Onc Dr. Poly Keith  - Palliative care consult appreciated for [facility]     # Alzheimer's dementia wo behavioral disturbance  - AAOx1  - SLP eval appreciated rec: puree w/ thin   - delirium precaution     # mild tachypena with cough, given nebs - she received 6 L of ivf on admission - given lasix 20 mg iv x 1   cw duanebs q 6 hourly  flonase nasal spray bid  rvp negative 7/12  anti-tussive   7/13- no more tachypneic , saturating well on room air, continue with flonase bid/ bibi-neb q 6 hourly -helping.   # DVT ppx:  Lovenox     # Advance directives: DNR/DNI (MOLST form filled out) [facility]  no heroic measures i.e. CPR or intubation in an event required resuscitation.   Informed Dr. Arroyo and Dr. Keith about admission     Contacts:  legal guardian( daughter): Bozena (Yoli) Tomasa [phone]  PMD Dr.Daniel Schumacher  Breast Surgeon: Dr.Paul Arroyo   Med Onc Dr. Poly Keith   Rad Onc Dr. Jesse Villeda     # Disposition: medically ready, plan for JEREMY placement -waiting for auth     medical team 89y F Shanghainese/Cantonese/limited English, b/l  hearing impairment (better hearing on left ear), with PMHx of Alzheimer's dementia without behavioral disturbance, AAOx1, partial ADL's, gait disturbance (RW assist, was able to walk 3 weeks prior to admission now bed bound for 5 days),hx left frontal craniotomy for cerebral meningioma ( 2015), Obesity (BMI 29.7), HTN, HLD, CKD3a, hx colonic polyps, moderate internal hemorrhoid and rectal prolapse (2021), hx hyponatremia 2/2 SIADH, recently diagnosed stage IIIc (T3N2Mo) triple negative ( ER-/MI-/HER2-) intraductal carcinoma( IDC) of left breast ~ 4.7cm ( 2/2025) followed by Dr.Paul Arroyo/Dr.Bonnie Keith receiving neoadjuvant chemoRx w/ CMF ( cyclophosphamide/methotrexate/5-FU started 4/15/25, s/p cycle 3 on 6/3 out of 8 cycles) now BIBEMS for rectal bleeding likely 2/2 sacral ulcer (? herpetic looking) and found to have ME/FTT with hyponatremia, RAFFI on CKD3a and functional quadriplegia Vs gait abnormality /bed bound for 5 days.-     encounter time 10 am  awake, alert,   eating - she can self feed, yogurt and icecream   saturating well on room air  she dose not endorse pain.      multiple   # rectal bleed likely 2/2 sacral ulcer ( HSV detected)   - MRI lumber spine showed no enhancement of thecal sac, pt is clinically better from ME/corrected hypernatremia and RAFFI  - ID consult appreciated  Dr. Dorsey, agreed to d/c acyclovir and resume valtrex 1g PO bid for total 7 days for HSV sacral ulcer   -  Valtrex 1g PO bid for 10 days ( 7/7-7/16) then reduced to 500mg po bid for HSV ppx if continuing on chemoRx   - Neuro f/u appreicated   - local wound care w/ foam dressing    # Hx Hyponatremia/SIADH  # Hypernatremia ( corrected) -most recent sodium 135 (7/11-  # RAFFI corrected ( likely 2/2 prerenal azotemia Vs ischemic ATN) on CKD3a  # acute urinary retention likely 2/2 constipation( moderate stool burden seen on CT)   - Renal f/u appreciated  sp pinzon -now on purewick catheter.   voiding well.   - bowel regimen for constipation; c/w Senna 2 tabs qHS and miralax 17g PO daily ok  # FTT likely related to chemoRx   # Protein Calorie malnutrition (albumin 2.6-2.7)   - Nutrition consult   - Ensure max protein (Vanilla) bid     # Deconditioning   # Functional quadriplegia   # gait abnormality to rule out Brain mets Vs stroke Vs medical induced neuropathy , doubt Elsberg syndrome   - Neuro consult appreciated   - CTH negative( 7/6)  - MRI brain w/wo contrast and MRI lumber spine w/wo contrast ( 7/7) negative brain/spine mets, no enhancement of thecal sac  - CK 77  - fu on the extensive labs sent for weakness -- so far negative.   - Fall precautions   - PT eval reviewed, required 2 people assistance and unable to full stand  daughterYoli whom now agreed to JEREMY placement     # Stage IIIc/T3N2/Mo triple negative IDC L breast ( TNBC)  - undergoing neoadjuvant chemo; CMF s/p 3 cycles ( 4/15, 5/13 and 6/3)    - Breast surgeon Dr. Mauri arroyo, Med Onc Dr. Poly Keith  - Palliative care consult appreciated for [facility]     # Alzheimer's dementia wo behavioral disturbance  - AAOx1  - SLP eval appreciated rec: puree w/ thin   - delirium precaution     # mild tachypena with cough, given nebs - she received 6 L of ivf on admission - given lasix 20 mg iv x 1   cw duanebs q 6 hourly  flonase nasal spray bid  rvp negative 7/12  anti-tussive   7/13- no more tachypneic , saturating well on room air, continue with flonase bid/ bibi-neb q 6 hourly -helping.   # DVT ppx:  Lovenox     # Advance directives: DNR/DNI (MOLST form filled out) [facility]  no heroic measures i.e. CPR or intubation in an event required resuscitation.   Informed Dr. Arroyo and Dr. Keith about admission     Contacts:  legal guardian( daughter): Bozena (Yoli) Tomasa [phone]  PMD Dr.Daniel Schumacher  Breast Surgeon: Dr.Paul Arroyo   Med Onc Dr. Poly Keith   Rad Onc Dr. Jesse Talcott     # Disposition: medically ready, plan for JEREMY placement -waiting for auth   dw  medical team

## 2025-07-14 NOTE — PROGRESS NOTE ADULT - PROBLEM SELECTOR PLAN 5
Patient has a history of CKD stage 3a and hyponatremia (possibly 2/2 SIADH per outpatient nephro note).  Sodium was 157 on admission, went up to 165 after the administration of 2L of NS.  Patient has 4.3L free water deficit  Renal consulted, given 165mL/hr D5  Na downtrending 144 > 142    - resolved Patient has a history of CKD stage 3a and hyponatremia (possibly 2/2 SIADH per outpatient nephro note).  Sodium was 157 on admission, went up to 165 after the administration of 2L of NS.  Patient has 4.3L free water deficit  Renal consulted, given 165mL/hr D5  Na now 138    - resolved

## 2025-07-14 NOTE — PROGRESS NOTE ADULT - PROBLEM SELECTOR PLAN 3
Decubital ulcer PCR positive for HSV2  - continue valtrex 1g PO BID x 10 days Decubital ulcer PCR positive for HSV2  - continue valtrex 1g PO BID x 10 days (7/7 - 7/16) then reduced to 500mg PO BID for HSV ppx if continuing on chemoRx - new onset subjective weakness   - non-focal, neuro following   - MRI with/without contrast brain and L spine showing no acute intracranial pathology and no mets to brain or spine. Spine showing stenosis L4 - L5  - CK, Myasthenia Gravis/Lambert-Eaton Myasthenic Syndrome Evaluation, Acetylcholine Modulating AB, Acetylcholine Blocking, LRP4 Autoantibody Test, Musk antibody, Paraneoplastic panel, ganglioside Ab ordered, PND to be done outpatient as per neuro

## 2025-07-14 NOTE — PROGRESS NOTE ADULT - SUBJECTIVE AND OBJECTIVE BOX
**INCOMPLETE NOTE    OVERNIGHT EVENTS:    SUBJECTIVE:  Patient seen and examined at bedside.    Vital Signs Last 12 Hrs  T(F): 98.8 (07-14-25 @ 05:39), Max: 99.1 (07-13-25 @ 20:54)  HR: 92 (07-14-25 @ 05:39) (91 - 92)  BP: 127/70 (07-14-25 @ 05:39) (112/63 - 127/70)  BP(mean): 80 (07-13-25 @ 20:54) (80 - 80)  RR: 18 (07-14-25 @ 05:39) (18 - 18)  SpO2: 98% (07-14-25 @ 05:39) (97% - 98%)  I&O's Summary    12 Jul 2025 07:01  -  13 Jul 2025 07:00  --------------------------------------------------------  IN: 0 mL / OUT: 500 mL / NET: -500 mL        PHYSICAL EXAM:  Constitutional: NAD, comfortable in bed.  HEENT: NC/AT, PERRLA, EOMI, no conjunctival pallor or scleral icterus, MMM  Neck: Supple, no JVD  Respiratory: CTA B/L. No w/r/r.   Cardiovascular: RRR, normal S1 and S2, no m/r/g.   Gastrointestinal: +BS, soft NTND, no guarding or rebound tenderness, no palpable masses   Extremities: wwp; no cyanosis, clubbing or edema.   Vascular: Pulses equal and strong throughout.   Neurological: AAOx3, no CN deficits, strength and sensation intact throughout.   Skin: No gross skin abnormalities or rashes        LABS:                        7.7    7.37  )-----------( 338      ( 13 Jul 2025 07:08 )             24.6     07-13    136  |  109[H]  |  45[H]  ----------------------------<  124[H]  3.8   |  16[L]  |  0.91    Ca    7.9[L]      13 Jul 2025 07:08  Phos  3.4     07-13  Mg     2.0     07-13    TPro  6.9  /  Alb  2.1[L]  /  TBili  0.3  /  DBili  x   /  AST  30  /  ALT  25  /  AlkPhos  84  07-13      Urinalysis Basic - ( 13 Jul 2025 07:08 )    Color: x / Appearance: x / SG: x / pH: x  Gluc: 124 mg/dL / Ketone: x  / Bili: x / Urobili: x   Blood: x / Protein: x / Nitrite: x   Leuk Esterase: x / RBC: x / WBC x   Sq Epi: x / Non Sq Epi: x / Bacteria: x          RADIOLOGY & ADDITIONAL TESTS:    MEDICATIONS  (STANDING):  albuterol/ipratropium for Nebulization 3 milliLiter(s) Nebulizer every 6 hours  dextrose 5%. 1000 milliLiter(s) (50 mL/Hr) IV Continuous <Continuous>  dextrose 5%. 1000 milliLiter(s) (100 mL/Hr) IV Continuous <Continuous>  dextrose 50% Injectable 25 Gram(s) IV Push once  dextrose 50% Injectable 12.5 Gram(s) IV Push once  dextrose 50% Injectable 25 Gram(s) IV Push once  enoxaparin Injectable 40 milliGRAM(s) SubCutaneous every 24 hours  fluticasone propionate 50 MICROgram(s)/spray Nasal Spray 1 Spray(s) Both Nostrils two times a day  glucagon  Injectable 1 milliGRAM(s) IntraMuscular once  multivitamin 1 Tablet(s) Oral daily  nystatin Powder 1 Application(s) Topical every 12 hours  polyethylene glycol 3350 17 Gram(s) Oral every 12 hours  senna 2 Tablet(s) Oral at bedtime  valACYclovir 1000 milliGRAM(s) Oral two times a day    MEDICATIONS  (PRN):  acetaminophen     Tablet .. 650 milliGRAM(s) Oral every 6 hours PRN Temp greater or equal to 38C (100.4F), Mild Pain (1 - 3)  aluminum hydroxide/magnesium hydroxide/simethicone Suspension 30 milliLiter(s) Oral every 4 hours PRN Dyspepsia  benzonatate 100 milliGRAM(s) Oral three times a day PRN Cough  dextrose Oral Gel 15 Gram(s) Oral once PRN Blood Glucose LESS THAN 70 milliGRAM(s)/deciliter  melatonin 3 milliGRAM(s) Oral at bedtime PRN Insomnia   **INCOMPLETE NOTE    OVERNIGHT EVENTS: none    SUBJECTIVE:  Patient seen and examined at bedside. Pt is sitting comfortably, denies any new symptoms since yesterday.     Vital Signs Last 12 Hrs  T(F): 98.8 (07-14-25 @ 05:39), Max: 99.1 (07-13-25 @ 20:54)  HR: 92 (07-14-25 @ 05:39) (91 - 92)  BP: 127/70 (07-14-25 @ 05:39) (112/63 - 127/70)  BP(mean): 80 (07-13-25 @ 20:54) (80 - 80)  RR: 18 (07-14-25 @ 05:39) (18 - 18)  SpO2: 98% (07-14-25 @ 05:39) (97% - 98%)  I&O's Summary    12 Jul 2025 07:01  -  13 Jul 2025 07:00  --------------------------------------------------------  IN: 0 mL / OUT: 500 mL / NET: -500 mL        PHYSICAL EXAM:  Constitutional: NAD, comfortable in bed.  Respiratory: CTA B/L. No w/r/r.   Cardiovascular: RRR, normal S1 and S2, no m/r/g.   Gastrointestinal: soft NTND, no palpable masses   Extremities: wwp; no cyanosis, clubbing or edema.   Vascular: Pulses equal and strong throughout.   Neurological: no obvious CN deficits  Skin: No gross skin abnormalities or rashes        LABS:                        7.7    7.37  )-----------( 338      ( 13 Jul 2025 07:08 )             24.6     07-13    136  |  109[H]  |  45[H]  ----------------------------<  124[H]  3.8   |  16[L]  |  0.91    Ca    7.9[L]      13 Jul 2025 07:08  Phos  3.4     07-13  Mg     2.0     07-13    TPro  6.9  /  Alb  2.1[L]  /  TBili  0.3  /  DBili  x   /  AST  30  /  ALT  25  /  AlkPhos  84  07-13      Urinalysis Basic - ( 13 Jul 2025 07:08 )    Color: x / Appearance: x / SG: x / pH: x  Gluc: 124 mg/dL / Ketone: x  / Bili: x / Urobili: x   Blood: x / Protein: x / Nitrite: x   Leuk Esterase: x / RBC: x / WBC x   Sq Epi: x / Non Sq Epi: x / Bacteria: x          RADIOLOGY & ADDITIONAL TESTS:    MEDICATIONS  (STANDING):  albuterol/ipratropium for Nebulization 3 milliLiter(s) Nebulizer every 6 hours  dextrose 5%. 1000 milliLiter(s) (50 mL/Hr) IV Continuous <Continuous>  dextrose 5%. 1000 milliLiter(s) (100 mL/Hr) IV Continuous <Continuous>  dextrose 50% Injectable 25 Gram(s) IV Push once  dextrose 50% Injectable 12.5 Gram(s) IV Push once  dextrose 50% Injectable 25 Gram(s) IV Push once  enoxaparin Injectable 40 milliGRAM(s) SubCutaneous every 24 hours  fluticasone propionate 50 MICROgram(s)/spray Nasal Spray 1 Spray(s) Both Nostrils two times a day  glucagon  Injectable 1 milliGRAM(s) IntraMuscular once  multivitamin 1 Tablet(s) Oral daily  nystatin Powder 1 Application(s) Topical every 12 hours  polyethylene glycol 3350 17 Gram(s) Oral every 12 hours  senna 2 Tablet(s) Oral at bedtime  valACYclovir 1000 milliGRAM(s) Oral two times a day    MEDICATIONS  (PRN):  acetaminophen     Tablet .. 650 milliGRAM(s) Oral every 6 hours PRN Temp greater or equal to 38C (100.4F), Mild Pain (1 - 3)  aluminum hydroxide/magnesium hydroxide/simethicone Suspension 30 milliLiter(s) Oral every 4 hours PRN Dyspepsia  benzonatate 100 milliGRAM(s) Oral three times a day PRN Cough  dextrose Oral Gel 15 Gram(s) Oral once PRN Blood Glucose LESS THAN 70 milliGRAM(s)/deciliter  melatonin 3 milliGRAM(s) Oral at bedtime PRN Insomnia       OVERNIGHT EVENTS: none    SUBJECTIVE:  Patient seen and examined at bedside. Pt is sitting comfortably, denies any new symptoms since yesterday.     Vital Signs Last 12 Hrs  T(F): 98.8 (07-14-25 @ 05:39), Max: 99.1 (07-13-25 @ 20:54)  HR: 92 (07-14-25 @ 05:39) (91 - 92)  BP: 127/70 (07-14-25 @ 05:39) (112/63 - 127/70)  BP(mean): 80 (07-13-25 @ 20:54) (80 - 80)  RR: 18 (07-14-25 @ 05:39) (18 - 18)  SpO2: 98% (07-14-25 @ 05:39) (97% - 98%)  I&O's Summary    12 Jul 2025 07:01  -  13 Jul 2025 07:00  --------------------------------------------------------  IN: 0 mL / OUT: 500 mL / NET: -500 mL        PHYSICAL EXAM:  Constitutional: NAD, comfortable in bed.  Respiratory: CTA B/L. No w/r/r.   Cardiovascular: RRR, normal S1 and S2, no m/r/g.   Gastrointestinal: soft NTND, no palpable masses   Extremities: wwp; no cyanosis, clubbing or edema.   Vascular: Pulses equal and strong throughout.   Neurological: no obvious CN deficits  Skin: No gross skin abnormalities or rashes        LABS:                        7.7    7.37  )-----------( 338      ( 13 Jul 2025 07:08 )             24.6     07-13    136  |  109[H]  |  45[H]  ----------------------------<  124[H]  3.8   |  16[L]  |  0.91    Ca    7.9[L]      13 Jul 2025 07:08  Phos  3.4     07-13  Mg     2.0     07-13    TPro  6.9  /  Alb  2.1[L]  /  TBili  0.3  /  DBili  x   /  AST  30  /  ALT  25  /  AlkPhos  84  07-13      Urinalysis Basic - ( 13 Jul 2025 07:08 )    Color: x / Appearance: x / SG: x / pH: x  Gluc: 124 mg/dL / Ketone: x  / Bili: x / Urobili: x   Blood: x / Protein: x / Nitrite: x   Leuk Esterase: x / RBC: x / WBC x   Sq Epi: x / Non Sq Epi: x / Bacteria: x          RADIOLOGY & ADDITIONAL TESTS:    MEDICATIONS  (STANDING):  albuterol/ipratropium for Nebulization 3 milliLiter(s) Nebulizer every 6 hours  dextrose 5%. 1000 milliLiter(s) (50 mL/Hr) IV Continuous <Continuous>  dextrose 5%. 1000 milliLiter(s) (100 mL/Hr) IV Continuous <Continuous>  dextrose 50% Injectable 25 Gram(s) IV Push once  dextrose 50% Injectable 12.5 Gram(s) IV Push once  dextrose 50% Injectable 25 Gram(s) IV Push once  enoxaparin Injectable 40 milliGRAM(s) SubCutaneous every 24 hours  fluticasone propionate 50 MICROgram(s)/spray Nasal Spray 1 Spray(s) Both Nostrils two times a day  glucagon  Injectable 1 milliGRAM(s) IntraMuscular once  multivitamin 1 Tablet(s) Oral daily  nystatin Powder 1 Application(s) Topical every 12 hours  polyethylene glycol 3350 17 Gram(s) Oral every 12 hours  senna 2 Tablet(s) Oral at bedtime  valACYclovir 1000 milliGRAM(s) Oral two times a day    MEDICATIONS  (PRN):  acetaminophen     Tablet .. 650 milliGRAM(s) Oral every 6 hours PRN Temp greater or equal to 38C (100.4F), Mild Pain (1 - 3)  aluminum hydroxide/magnesium hydroxide/simethicone Suspension 30 milliLiter(s) Oral every 4 hours PRN Dyspepsia  benzonatate 100 milliGRAM(s) Oral three times a day PRN Cough  dextrose Oral Gel 15 Gram(s) Oral once PRN Blood Glucose LESS THAN 70 milliGRAM(s)/deciliter  melatonin 3 milliGRAM(s) Oral at bedtime PRN Insomnia

## 2025-07-15 ENCOUNTER — APPOINTMENT (OUTPATIENT)
Dept: INFUSION THERAPY | Facility: CLINIC | Age: 89
End: 2025-07-15

## 2025-07-15 LAB
ALBUMIN SERPL ELPH-MCNC: 2.3 G/DL — LOW (ref 3.3–5)
ALP SERPL-CCNC: 108 U/L — SIGNIFICANT CHANGE UP (ref 40–120)
ALT FLD-CCNC: 34 U/L — SIGNIFICANT CHANGE UP (ref 10–45)
ANION GAP SERPL CALC-SCNC: 10 MMOL/L — SIGNIFICANT CHANGE UP (ref 5–17)
APPEARANCE UR: ABNORMAL
AST SERPL-CCNC: 41 U/L — HIGH (ref 10–40)
BILIRUB SERPL-MCNC: 0.3 MG/DL — SIGNIFICANT CHANGE UP (ref 0.2–1.2)
BILIRUB UR-MCNC: NEGATIVE — SIGNIFICANT CHANGE UP
BUN SERPL-MCNC: 40 MG/DL — HIGH (ref 7–23)
CALCIUM SERPL-MCNC: 8.2 MG/DL — LOW (ref 8.4–10.5)
CHLORIDE SERPL-SCNC: 108 MMOL/L — SIGNIFICANT CHANGE UP (ref 96–108)
CO2 SERPL-SCNC: 18 MMOL/L — LOW (ref 22–31)
COLOR SPEC: YELLOW — SIGNIFICANT CHANGE UP
CREAT SERPL-MCNC: 0.85 MG/DL — SIGNIFICANT CHANGE UP (ref 0.5–1.3)
DIFF PNL FLD: ABNORMAL
EGFR: 65 ML/MIN/1.73M2 — SIGNIFICANT CHANGE UP
EGFR: 65 ML/MIN/1.73M2 — SIGNIFICANT CHANGE UP
GLUCOSE SERPL-MCNC: 129 MG/DL — HIGH (ref 70–99)
GLUCOSE UR QL: NEGATIVE MG/DL — SIGNIFICANT CHANGE UP
HCT VFR BLD CALC: 23.5 % — LOW (ref 34.5–45)
HGB BLD-MCNC: 7.5 G/DL — LOW (ref 11.5–15.5)
KETONES UR QL: NEGATIVE MG/DL — SIGNIFICANT CHANGE UP
LACTATE SERPL-SCNC: 1.7 MMOL/L — SIGNIFICANT CHANGE UP (ref 0.5–2)
LEUKOCYTE ESTERASE UR-ACNC: ABNORMAL
MCHC RBC-ENTMCNC: 28.7 PG — SIGNIFICANT CHANGE UP (ref 27–34)
MCHC RBC-ENTMCNC: 31.9 G/DL — LOW (ref 32–36)
MCV RBC AUTO: 90 FL — SIGNIFICANT CHANGE UP (ref 80–100)
NITRITE UR-MCNC: POSITIVE
NRBC # BLD AUTO: 0 K/UL — SIGNIFICANT CHANGE UP (ref 0–0)
NRBC # FLD: 0 K/UL — SIGNIFICANT CHANGE UP (ref 0–0)
NRBC BLD AUTO-RTO: 0 /100 WBCS — SIGNIFICANT CHANGE UP (ref 0–0)
PH UR: 8.5 (ref 5–8)
PLATELET # BLD AUTO: 437 K/UL — HIGH (ref 150–400)
PMV BLD: 9.7 FL — SIGNIFICANT CHANGE UP (ref 7–13)
POTASSIUM SERPL-MCNC: 4.4 MMOL/L — SIGNIFICANT CHANGE UP (ref 3.5–5.3)
POTASSIUM SERPL-SCNC: 4.4 MMOL/L — SIGNIFICANT CHANGE UP (ref 3.5–5.3)
PROT SERPL-MCNC: 7.1 G/DL — SIGNIFICANT CHANGE UP (ref 6–8.3)
PROT UR-MCNC: 300 MG/DL
RAPID RVP RESULT: SIGNIFICANT CHANGE UP
RBC # BLD: 2.61 M/UL — LOW (ref 3.8–5.2)
RBC # FLD: 20.4 % — HIGH (ref 10.3–14.5)
SARS-COV-2 RNA SPEC QL NAA+PROBE: SIGNIFICANT CHANGE UP
SODIUM SERPL-SCNC: 136 MMOL/L — SIGNIFICANT CHANGE UP (ref 135–145)
SP GR SPEC: 1.02 — SIGNIFICANT CHANGE UP (ref 1–1.03)
UROBILINOGEN FLD QL: 1 MG/DL — SIGNIFICANT CHANGE UP (ref 0.2–1)
WBC # BLD: 5.98 K/UL — SIGNIFICANT CHANGE UP (ref 3.8–10.5)
WBC # FLD AUTO: 5.98 K/UL — SIGNIFICANT CHANGE UP (ref 3.8–10.5)

## 2025-07-15 PROCEDURE — 71045 X-RAY EXAM CHEST 1 VIEW: CPT | Mod: 26

## 2025-07-15 PROCEDURE — 99233 SBSQ HOSP IP/OBS HIGH 50: CPT

## 2025-07-15 RX ORDER — AMMONIUM LACTATE 12 %
0 LOTION (ML) TOPICAL
Refills: 0 | DISCHARGE

## 2025-07-15 RX ORDER — SODIUM CHLORIDE 9 G/1000ML
250 INJECTION, SOLUTION INTRAVENOUS ONCE
Refills: 0 | Status: COMPLETED | OUTPATIENT
Start: 2025-07-15 | End: 2025-07-15

## 2025-07-15 RX ORDER — CEFTRIAXONE 500 MG/1
1000 INJECTION, POWDER, FOR SOLUTION INTRAMUSCULAR; INTRAVENOUS EVERY 24 HOURS
Refills: 0 | Status: DISCONTINUED | OUTPATIENT
Start: 2025-07-15 | End: 2025-07-16

## 2025-07-15 RX ORDER — ACETAMINOPHEN 500 MG/5ML
650 LIQUID (ML) ORAL
Refills: 0 | Status: DISCONTINUED | OUTPATIENT
Start: 2025-07-15 | End: 2025-07-16

## 2025-07-15 RX ADMIN — DEXTROMETHORPHAN HBR, GUAIFENESIN 100 MILLIGRAM(S): 200 LIQUID ORAL at 13:01

## 2025-07-15 RX ADMIN — IPRATROPIUM BROMIDE AND ALBUTEROL SULFATE 3 MILLILITER(S): .5; 2.5 SOLUTION RESPIRATORY (INHALATION) at 10:10

## 2025-07-15 RX ADMIN — POLYETHYLENE GLYCOL 3350 17 GRAM(S): 17 POWDER, FOR SOLUTION ORAL at 17:54

## 2025-07-15 RX ADMIN — FLUTICASONE PROPIONATE 1 SPRAY(S): 50 SPRAY, METERED NASAL at 17:55

## 2025-07-15 RX ADMIN — Medication 2 TABLET(S): at 21:29

## 2025-07-15 RX ADMIN — Medication 1000 MILLIGRAM(S): at 05:36

## 2025-07-15 RX ADMIN — IPRATROPIUM BROMIDE AND ALBUTEROL SULFATE 3 MILLILITER(S): .5; 2.5 SOLUTION RESPIRATORY (INHALATION) at 21:29

## 2025-07-15 RX ADMIN — IPRATROPIUM BROMIDE AND ALBUTEROL SULFATE 3 MILLILITER(S): .5; 2.5 SOLUTION RESPIRATORY (INHALATION) at 15:52

## 2025-07-15 RX ADMIN — FLUTICASONE PROPIONATE 1 SPRAY(S): 50 SPRAY, METERED NASAL at 05:37

## 2025-07-15 RX ADMIN — SODIUM CHLORIDE 250 MILLILITER(S): 9 INJECTION, SOLUTION INTRAVENOUS at 17:55

## 2025-07-15 RX ADMIN — DEXTROMETHORPHAN HBR, GUAIFENESIN 100 MILLIGRAM(S): 200 LIQUID ORAL at 17:54

## 2025-07-15 RX ADMIN — DEXTROMETHORPHAN HBR, GUAIFENESIN 100 MILLIGRAM(S): 200 LIQUID ORAL at 05:36

## 2025-07-15 RX ADMIN — Medication 1 TABLET(S): at 13:01

## 2025-07-15 RX ADMIN — NYSTATIN 1 APPLICATION(S): 100000 CREAM TOPICAL at 05:36

## 2025-07-15 RX ADMIN — Medication 650 MILLIGRAM(S): at 13:01

## 2025-07-15 RX ADMIN — Medication 250 MILLILITER(S): at 14:23

## 2025-07-15 RX ADMIN — Medication 1000 MILLIGRAM(S): at 17:54

## 2025-07-15 RX ADMIN — Medication 650 MILLIGRAM(S): at 21:29

## 2025-07-15 RX ADMIN — CEFTRIAXONE 100 MILLIGRAM(S): 500 INJECTION, POWDER, FOR SOLUTION INTRAMUSCULAR; INTRAVENOUS at 19:46

## 2025-07-15 RX ADMIN — NYSTATIN 1 APPLICATION(S): 100000 CREAM TOPICAL at 17:54

## 2025-07-15 RX ADMIN — ENOXAPARIN SODIUM 40 MILLIGRAM(S): 100 INJECTION SUBCUTANEOUS at 15:53

## 2025-07-15 NOTE — SWALLOW BEDSIDE ASSESSMENT ADULT - SLP GENERAL OBSERVATIONS
Received awake in bed, breathing RA, limited verbal output, follows some basic commands. According to the RN, the patient has been observed coughing ~10 minutes after eating purees. Notably, she recently developed a cough that has been present at baseline. The patient is now febrile. No recent chest imaging.
Patient was seen fully awake and alert, HOB fully elevated, on room air. Patient primarily nonverbal and did not follow directions. Daughter, Martha at bedside. She reported pt does not like applesauce and pudding. She reported she likes soups and yogurt.

## 2025-07-15 NOTE — PROGRESS NOTE ADULT - SUBJECTIVE AND OBJECTIVE BOX
OVERNIGHT EVENTS: pt refused shiv    SUBJECTIVE:  Patient seen and examined at bedside. Pt is sitting comfortably, denies any new symptoms since yesterday.     Vital Signs Last 12 Hrs  T(F): 98.8 (07-14-25 @ 05:39), Max: 99.1 (07-13-25 @ 20:54)  HR: 92 (07-14-25 @ 05:39) (91 - 92)  BP: 127/70 (07-14-25 @ 05:39) (112/63 - 127/70)  BP(mean): 80 (07-13-25 @ 20:54) (80 - 80)  RR: 18 (07-14-25 @ 05:39) (18 - 18)  SpO2: 98% (07-14-25 @ 05:39) (97% - 98%)  I&O's Summary    12 Jul 2025 07:01  -  13 Jul 2025 07:00  --------------------------------------------------------  IN: 0 mL / OUT: 500 mL / NET: -500 mL        PHYSICAL EXAM:  Constitutional: NAD, comfortable in bed.  Respiratory: CTA B/L. No w/r/r.   Cardiovascular: RRR, normal S1 and S2, no m/r/g.   Gastrointestinal: soft NTND, no palpable masses   Extremities: wwp; no cyanosis, clubbing or edema.   Vascular: Pulses equal and strong throughout.   Neurological: no obvious CN deficits  Skin: No gross skin abnormalities or rashes        LABS:                        7.7    7.37  )-----------( 338      ( 13 Jul 2025 07:08 )             24.6     07-13    136  |  109[H]  |  45[H]  ----------------------------<  124[H]  3.8   |  16[L]  |  0.91    Ca    7.9[L]      13 Jul 2025 07:08  Phos  3.4     07-13  Mg     2.0     07-13    TPro  6.9  /  Alb  2.1[L]  /  TBili  0.3  /  DBili  x   /  AST  30  /  ALT  25  /  AlkPhos  84  07-13      Urinalysis Basic - ( 13 Jul 2025 07:08 )    Color: x / Appearance: x / SG: x / pH: x  Gluc: 124 mg/dL / Ketone: x  / Bili: x / Urobili: x   Blood: x / Protein: x / Nitrite: x   Leuk Esterase: x / RBC: x / WBC x   Sq Epi: x / Non Sq Epi: x / Bacteria: x          RADIOLOGY & ADDITIONAL TESTS:    MEDICATIONS  (STANDING):  albuterol/ipratropium for Nebulization 3 milliLiter(s) Nebulizer every 6 hours  dextrose 5%. 1000 milliLiter(s) (50 mL/Hr) IV Continuous <Continuous>  dextrose 5%. 1000 milliLiter(s) (100 mL/Hr) IV Continuous <Continuous>  dextrose 50% Injectable 25 Gram(s) IV Push once  dextrose 50% Injectable 12.5 Gram(s) IV Push once  dextrose 50% Injectable 25 Gram(s) IV Push once  enoxaparin Injectable 40 milliGRAM(s) SubCutaneous every 24 hours  fluticasone propionate 50 MICROgram(s)/spray Nasal Spray 1 Spray(s) Both Nostrils two times a day  glucagon  Injectable 1 milliGRAM(s) IntraMuscular once  multivitamin 1 Tablet(s) Oral daily  nystatin Powder 1 Application(s) Topical every 12 hours  polyethylene glycol 3350 17 Gram(s) Oral every 12 hours  senna 2 Tablet(s) Oral at bedtime  valACYclovir 1000 milliGRAM(s) Oral two times a day    MEDICATIONS  (PRN):  acetaminophen     Tablet .. 650 milliGRAM(s) Oral every 6 hours PRN Temp greater or equal to 38C (100.4F), Mild Pain (1 - 3)  aluminum hydroxide/magnesium hydroxide/simethicone Suspension 30 milliLiter(s) Oral every 4 hours PRN Dyspepsia  benzonatate 100 milliGRAM(s) Oral three times a day PRN Cough  dextrose Oral Gel 15 Gram(s) Oral once PRN Blood Glucose LESS THAN 70 milliGRAM(s)/deciliter  melatonin 3 milliGRAM(s) Oral at bedtime PRN Insomnia   OVERNIGHT EVENTS: pt refused shiv    SUBJECTIVE:  Patient seen and examined at bedside. Pt is sitting comfortably, denies any new symptoms since yesterday. cough is ongoing    Vital Signs Last 12 Hrs  T(F): 98.8 (07-14-25 @ 05:39), Max: 99.1 (07-13-25 @ 20:54)  HR: 92 (07-14-25 @ 05:39) (91 - 92)  BP: 127/70 (07-14-25 @ 05:39) (112/63 - 127/70)  BP(mean): 80 (07-13-25 @ 20:54) (80 - 80)  RR: 18 (07-14-25 @ 05:39) (18 - 18)  SpO2: 98% (07-14-25 @ 05:39) (97% - 98%)  I&O's Summary    12 Jul 2025 07:01  -  13 Jul 2025 07:00  --------------------------------------------------------  IN: 0 mL / OUT: 500 mL / NET: -500 mL        PHYSICAL EXAM:  Constitutional: NAD, comfortable in bed.  Respiratory: CTA B/L. No w/r/r.   Cardiovascular: RRR, normal S1 and S2, no m/r/g.   Gastrointestinal: soft NTND, no palpable masses   Extremities: wwp; no cyanosis, clubbing or edema.   Vascular: Pulses equal and strong throughout.   Neurological: no obvious CN deficits  Skin: No gross skin abnormalities or rashes        LABS:                        7.7    7.37  )-----------( 338      ( 13 Jul 2025 07:08 )             24.6     07-13    136  |  109[H]  |  45[H]  ----------------------------<  124[H]  3.8   |  16[L]  |  0.91    Ca    7.9[L]      13 Jul 2025 07:08  Phos  3.4     07-13  Mg     2.0     07-13    TPro  6.9  /  Alb  2.1[L]  /  TBili  0.3  /  DBili  x   /  AST  30  /  ALT  25  /  AlkPhos  84  07-13      Urinalysis Basic - ( 13 Jul 2025 07:08 )    Color: x / Appearance: x / SG: x / pH: x  Gluc: 124 mg/dL / Ketone: x  / Bili: x / Urobili: x   Blood: x / Protein: x / Nitrite: x   Leuk Esterase: x / RBC: x / WBC x   Sq Epi: x / Non Sq Epi: x / Bacteria: x          RADIOLOGY & ADDITIONAL TESTS:    MEDICATIONS  (STANDING):  albuterol/ipratropium for Nebulization 3 milliLiter(s) Nebulizer every 6 hours  dextrose 5%. 1000 milliLiter(s) (50 mL/Hr) IV Continuous <Continuous>  dextrose 5%. 1000 milliLiter(s) (100 mL/Hr) IV Continuous <Continuous>  dextrose 50% Injectable 25 Gram(s) IV Push once  dextrose 50% Injectable 12.5 Gram(s) IV Push once  dextrose 50% Injectable 25 Gram(s) IV Push once  enoxaparin Injectable 40 milliGRAM(s) SubCutaneous every 24 hours  fluticasone propionate 50 MICROgram(s)/spray Nasal Spray 1 Spray(s) Both Nostrils two times a day  glucagon  Injectable 1 milliGRAM(s) IntraMuscular once  multivitamin 1 Tablet(s) Oral daily  nystatin Powder 1 Application(s) Topical every 12 hours  polyethylene glycol 3350 17 Gram(s) Oral every 12 hours  senna 2 Tablet(s) Oral at bedtime  valACYclovir 1000 milliGRAM(s) Oral two times a day    MEDICATIONS  (PRN):  acetaminophen     Tablet .. 650 milliGRAM(s) Oral every 6 hours PRN Temp greater or equal to 38C (100.4F), Mild Pain (1 - 3)  aluminum hydroxide/magnesium hydroxide/simethicone Suspension 30 milliLiter(s) Oral every 4 hours PRN Dyspepsia  benzonatate 100 milliGRAM(s) Oral three times a day PRN Cough  dextrose Oral Gel 15 Gram(s) Oral once PRN Blood Glucose LESS THAN 70 milliGRAM(s)/deciliter  melatonin 3 milliGRAM(s) Oral at bedtime PRN Insomnia

## 2025-07-15 NOTE — SWALLOW BEDSIDE ASSESSMENT ADULT - COMMENTS
Initial SLP evaluation 6/5/25: Limited intake of consistencies administered. Most pureed items were declined. No clinical indicators of penetration/aspiration noted. Patient consumed liquids best via straw. Presentation c/w advanced dementia. Recommend baseline diet of pureed and thin liquids as tolerated with general aspiration precautions. No further acute intervention deemed warranted at this time.
CXR 7/5/25: No radiographic evidence of active chest disease.  Daughter reported baseline diet of purees and thin liquids at home. She endorsed a dry cough that occurs regardless of oral intake. Denied concern for PNA hx.  RN requested clinical swallow eval due to holding of applesauce in oral cavity. Given daughter's report of patient's likes/dislikes, holding of applesauce likely due to dislike. Daughter reported if she does not like something patient tends to spit it out.

## 2025-07-15 NOTE — PROGRESS NOTE ADULT - PROBLEM SELECTOR PLAN 5
Patient has a history of CKD stage 3a and hyponatremia (possibly 2/2 SIADH per outpatient nephro note).  Sodium was 157 on admission, went up to 165 after the administration of 2L of NS.  Patient has 4.3L free water deficit  Renal consulted, given 165mL/hr D5  Na now 138    - resolved

## 2025-07-15 NOTE — SWALLOW BEDSIDE ASSESSMENT ADULT - SPECIFY REASON(S)
reconsulted to assess swallowing d/t reported coughing with purees.
to clinically assess swallow function

## 2025-07-15 NOTE — SWALLOW BEDSIDE ASSESSMENT ADULT - SWALLOW EVAL: DIAGNOSIS
No significant changes in the patient’s clinical presentation, aside from increased bolus acceptance and inadequate straw use, compared to initial evaluation. Reported coughing after swallowing purees may have been associated with suboptimal positioning and/or reflux-related events. No clinical predictors of airway protection. However, please note that silent aspiration cannot be excluded at bedside and would require instrumental swallow evaluation.
Limited intake of consistencies administered. Most pureed items were declined. No clinical indicators of penetration/aspiration noted. Patient consumed liquids best via straw. Presentation c/w advanced dementia. Recommend baseline diet of pureed and thin liquids as tolerated with general aspiration precautions. No further acute intervention deemed warranted at this time.

## 2025-07-15 NOTE — PROGRESS NOTE ADULT - ATTENDING COMMENTS
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 herpetic looking) and found to have ME/FTT with hyponatremia, RAFFI on CKD3a and functional quadriplegia Vs gait abnormality /bed bound for 5 days.-     encounter time 9 am   resting, easily awakable.  saturating well on room air, denies pain ( though daughter reported she stated pain when they move her )  good air entry bilaterally.   she could not relate 24 hours event  no event reported      multiple comorbs.  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   - MRI lumber spine showed no enhancement of thecal sac, pt is clinically better from ME/corrected hypernatremia and RAFFI  - ID consult appreciated  Dr. Dorsey, agreed to d/c acyclovir and resume valtrex 1g PO bid for total 7 days for HSV sacral ulcer   -  Valtrex 1g PO bid for 10 days ( 7/7-7/16) then reduced to 500mg po bid for HSV ppx if continuing on chemoRx   - Neuro f/u appreicated   - local wound care w/ foam dressing    # Hx Hyponatremia/SIADH  # Hypernatremia ( corrected) -most recent sodium 135 (7/11-  # RAFFI corrected ( likely 2/2 prerenal azotemia Vs ischemic ATN) on CKD3a  # acute urinary retention likely 2/2 constipation( moderate stool burden seen on CT)   - Renal f/u appreciated  sp pinzon -now on purewick catheter.   voiding well.     - bowel regimen for constipation; c/w Senna 2 tabs qHS and miralax 17g PO daily ok  # FTT likely related to chemoRx   # Protein Calorie malnutrition (albumin 2.6-2.7)   - Nutrition consult   - Ensure max protein (Vanilla) bid     # Deconditioning   # Functional quadriplegia   # gait abnormality to rule out Brain mets Vs stroke Vs medical induced neuropathy , doubt Elsberg syndrome   - Neuro consult appreciated   - CTH negative( 7/6)  - MRI brain w/wo contrast and MRI lumber spine w/wo contrast ( 7/7) negative brain/spine mets, no enhancement of thecal sac  - CK 77  - fu on the extensive labs sent for weakness -- so far negative.   - Fall precautions   - PT eval reviewed, required 2 people assistance and unable to full stand  daughter, Yoli whom now agreed to JEREMY placement   - to give Tylenol 650 mg po tid standing for 2 days for pain ( musculoskeletal)     # Stage IIIc/T3N2/Mo triple negative IDC L breast ( TNBC)  - undergoing neoadjuvant chemo; CMF s/p 3 cycles ( 4/15, 5/13 and 6/3)    - Breast surgeon Dr. Mauri arroyo, Med Onc Dr. Poly Keith  - Palliative care consult appreciated for [facility]     # Alzheimer's dementia wo behavioral disturbance  - AAOx1  - SLP eval appreciated rec: puree w/ thin   - delirium precaution     # saturating well on room air, no tachypnea.   cw duanebs q 6 hourly  flonase nasal spray bid  rvp negative 7/12  anti-tussive   # DVT ppx:  Lovenox     # Advance directives: DNR/DNI (MOLST form filled out) [facility]  no heroic measures i.e. CPR or intubation in an event required resuscitation.   Informed Dr. Arroyo and Dr. Keith about admission     Contacts:  legal guardian( daughter): Bozena (Yoli) Tomasa [phone]  PMD Dr.Daniel Schumacher  Breast Surgeon: Dr.Paul Arroyo   Med Onc Dr. Poly Keith   Rad Onc Dr. Jesse Villeda     # Disposition: medically ready, plan for JEREMY placement -waiting for auth   dw daughter 7/14-     medical team 89y F Shanghainese/Cantonese/limited English, b/l  hearing impairment (better hearing on left ear), with PMHx of Alzheimer's dementia without behavioral disturbance, AAOx1, partial ADL's, gait disturbance (RW assist, was able to walk 3 weeks prior to admission now bed bound for 5 days),hx left frontal craniotomy for cerebral meningioma ( 2015), Obesity (BMI 29.7), HTN, HLD, CKD3a, hx colonic polyps, moderate internal hemorrhoid and rectal prolapse (2021), hx hyponatremia 2/2 SIADH, recently diagnosed stage IIIc (T3N2Mo) triple negative ( ER-/NH-/HER2-) intraductal carcinoma( IDC) of left breast ~ 4.7cm ( 2/2025) followed by Dr.Paul Arroyo/Dr.Bonnie Keith receiving neoadjuvant chemoRx w/ CMF ( cyclophosphamide/methotrexate/5-FU started 4/15/25, s/p cycle 3 on 6/3 out of 8 cycles) now BIBEMS for rectal bleeding likely 2/2 sacral ulcer (? herpetic looking) and found to have ME/FTT with hyponatremia, RAFFI on CKD3a and functional quadriplegia Vs gait abnormality /bed bound for 5 days.-     encounter time 9 am   resting, easily awakable.  saturating well on room air, denies pain ( though daughter reported she stated pain when they move her )  good air entry bilaterally.   she could not relate 24 hours event  no event reported      multiple comorbs.  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   - MRI lumber spine showed no enhancement of thecal sac, pt is clinically better from ME/corrected hypernatremia and RAFFI  - ID consult appreciated  Dr. Dorsey, agreed to d/c acyclovir and resume valtrex 1g PO bid for total 7 days for HSV sacral ulcer   -  Valtrex 1g PO bid for 10 days ( 7/7-7/16) then reduced to 500mg po bid for HSV ppx if continuing on chemoRx   - Neuro f/u appreicated   - local wound care w/ foam dressing    # Hx Hyponatremia/SIADH  # Hypernatremia ( corrected) -most recent sodium 135 (7/11-  # RAFFI corrected ( likely 2/2 prerenal azotemia Vs ischemic ATN) on CKD3a  # acute urinary retention likely 2/2 constipation( moderate stool burden seen on CT)   - Renal f/u appreciated  sp pinzon -now on purewick catheter.   voiding well.     - bowel regimen for constipation; c/w Senna 2 tabs qHS and miralax 17g PO daily ok  # FTT likely related to chemoRx   # Protein Calorie malnutrition (albumin 2.6-2.7)   - Nutrition consult   - Ensure max protein (Vanilla) bid   - puree diet     # Deconditioning   # Functional quadriplegia   # gait abnormality to rule out Brain mets Vs stroke Vs medical induced neuropathy , doubt Elsberg syndrome   - Neuro consult appreciated   - CTH negative( 7/6)  - MRI brain w/wo contrast and MRI lumber spine w/wo contrast ( 7/7) negative brain/spine mets, no enhancement of thecal sac  - CK 77  - fu on the extensive labs sent for weakness -- so far negative.   - Fall precautions   - PT eval reviewed, required 2 people assistance and unable to full stand  daughter, Yoli whom now agreed to JEREMY placement   - to give Tylenol 650 mg po tid standing for 2 days for pain ( musculoskeletal)     # Stage IIIc/T3N2/Mo triple negative IDC L breast ( TNBC)  - undergoing neoadjuvant chemo; CMF s/p 3 cycles ( 4/15, 5/13 and 6/3)    - Breast surgeon Dr. Mauri arroyo, Med Onc Dr. Poly Keith  - Palliative care consult appreciated for [facility] [place]     # Alzheimer's dementia wo behavioral disturbance  - AAOx1  - SLP eval appreciated rec: puree w/ thin   - delirium precaution     # saturating well on room air, no tachypnea.   cw duanebs q 6 hourly  flonase nasal spray bid  rvp negative 7/12  anti-tussive   # DVT ppx:  Lovenox     # Advance directives: DNR/DNI (MOLST form filled out) [facility] [place] [facility]  no heroic measures i.e. CPR or intubation in an event required resuscitation.   Informed Dr. Arroyo and Dr. Keith about admission     Contacts:  legal guardian( daughter): Bozena Burton) Tomasa [phone]  PMD Dr.Daniel Schumacher  Breast Surgeon: Dr.Paul Arroyo   Med Onc Dr. Poly Keith   Rad Onc Dr. Jesse Villeda     # Disposition: medically ready, plan for JEREMY placement -waiting for auth   dw daughter 7/14-   dw  medical team

## 2025-07-15 NOTE — PROGRESS NOTE ADULT - PROBLEM SELECTOR PLAN 3
- new onset subjective weakness   - non-focal, neuro following   - MRI with/without contrast brain and L spine showing no acute intracranial pathology and no mets to brain or spine. Spine showing stenosis L4 - L5  - CK, Myasthenia Gravis/Lambert-Eaton Myasthenic Syndrome Evaluation, Acetylcholine Modulating AB, Acetylcholine Blocking, LRP4 Autoantibody Test, Musk antibody, Paraneoplastic panel, ganglioside Ab ordered, PND to be done outpatient as per neuro

## 2025-07-15 NOTE — PROGRESS NOTE ADULT - PROBLEM SELECTOR PLAN 4
- possible etiology: constipation, KUB with moderate fecal material in colon, miralax and senna ordered  - resolved  - s/p pinzon now on purewick catheter voiding well

## 2025-07-15 NOTE — SWALLOW BEDSIDE ASSESSMENT ADULT - NS SPL SWALLOW CLINIC TRIAL FT
Anterior loss of liquids due to reduced labial seal/poor oral bolus control. Slight improvement in oral bolus containment with hand-over-hand administration. Patient was unable to generate adequate intra-oral pressure to successfully use a straw. Slow bolus processing with purees but with adequate oral clearance. Laryngeal movement palpated. 1-2 secondary swallows appreciated at times, suggestive of pharyngeal clearance deficits. No clinical overt s/s of pen/asp. However, unable to r/o silent asp.

## 2025-07-15 NOTE — SWALLOW BEDSIDE ASSESSMENT ADULT - ADDITIONAL RECOMMENDATIONS
Control risk factors for aspiration PNA via frequent oral hygiene and increasing physical mobility as possible

## 2025-07-15 NOTE — SWALLOW BEDSIDE ASSESSMENT ADULT - SWALLOW EVAL: RECOMMENDED FEEDING/EATING TECHNIQUES
crush medication (when feasible)/maintain upright posture during/after eating for 30 mins/oral hygiene/position upright (90 degrees)/small sips/bites
Reposition patient to an upright position prior to administering PO/crush medication (when feasible)/maintain upright posture during/after eating for 30 mins/no straws/position upright (90 degrees)/small sips/bites

## 2025-07-15 NOTE — PROGRESS NOTE ADULT - PROBLEM SELECTOR PLAN 8
Pt with mild tachypnea and cough  Pt treated for hypernatremia with fluids, lasix 20mg IV given x 1  No more tachypnea    - c/w Duoneb q6h  - c/w flonase nasal spray  - rvp negative 7/12  - RVP panel ordered Pt with mild tachypnea and cough  Pt treated for hypernatremia with fluids, lasix 20mg IV given x 1  No more tachypnea  rvp negative 7/12  Pt coughing after eating    - c/w Duoneb q6h  - c/w flonase nasal spray  - Swallow eval ordered due to cough after eating

## 2025-07-15 NOTE — SWALLOW BEDSIDE ASSESSMENT ADULT - CONSISTENCIES ADMINISTERED
~4oz thins, 1/2 container (~2oz) puree/thin liquid/pureed
thin liquids via cup x 2 and straw x 4 and ice cream x 4 (declined all other items: applesauce and pudding, unable to find yogurt)

## 2025-07-15 NOTE — SWALLOW BEDSIDE ASSESSMENT ADULT - SLP PERTINENT HISTORY OF CURRENT PROBLEM
89y F with pmh Alzheimer's dementia (A&Ox1), b/l hearing impairment, metastatic breast cancer, hx left frontal craniotomy for cerebral meningioma ( 2015)brought in by EMS for rectal bleeding. CTAP showed no GI bleeding. 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 Also with new weakness, CTH showed no acute intracranial pathology. Course complicated by hypernatremia to 163 which resolved with D5 now at 142. MRI brain showing no acute intracranial pathology, and no brain metastasis. MRI lumbar showed L4 - L5 stenosis with no metastasis,
pmh Alzheimer's dementia, metastatic breast cancer, hypertension, stage 3a chronic kidney disease, hyperlipidemia, cerebral meningioma, hyponatremia, and vitamin D deficiency brought in by EMS for rectal bleeding.

## 2025-07-15 NOTE — PROGRESS NOTE ADULT - PROBLEM SELECTOR PLAN 2
Decubital ulcer PCR positive for HSV2  - continue valtrex 1g PO BID x 10 days (7/7 - 7/16) then reduced to 500mg PO BID for HSV ppx if continuing on chemoRx

## 2025-07-16 ENCOUNTER — TRANSCRIPTION ENCOUNTER (OUTPATIENT)
Age: 89
End: 2025-07-16

## 2025-07-16 VITALS
HEART RATE: 87 BPM | RESPIRATION RATE: 18 BRPM | OXYGEN SATURATION: 97 % | TEMPERATURE: 99 F | SYSTOLIC BLOOD PRESSURE: 104 MMHG | DIASTOLIC BLOOD PRESSURE: 62 MMHG

## 2025-07-16 DIAGNOSIS — J69.0 PNEUMONITIS DUE TO INHALATION OF FOOD AND VOMIT: ICD-10-CM

## 2025-07-16 LAB
ALBUMIN SERPL ELPH-MCNC: 2.2 G/DL — LOW (ref 3.3–5)
ALP SERPL-CCNC: 113 U/L — SIGNIFICANT CHANGE UP (ref 40–120)
ALT FLD-CCNC: 39 U/L — SIGNIFICANT CHANGE UP (ref 10–45)
AMPHIPHYSIN AB TITR SER: NEGATIVE — SIGNIFICANT CHANGE UP
ANION GAP SERPL CALC-SCNC: 11 MMOL/L — SIGNIFICANT CHANGE UP (ref 5–17)
AST SERPL-CCNC: 48 U/L — HIGH (ref 10–40)
BASOPHILS # BLD AUTO: 0.01 K/UL — SIGNIFICANT CHANGE UP (ref 0–0.2)
BASOPHILS NFR BLD AUTO: 0.2 % — SIGNIFICANT CHANGE UP (ref 0–2)
BILIRUB SERPL-MCNC: 0.2 MG/DL — SIGNIFICANT CHANGE UP (ref 0.2–1.2)
BUN SERPL-MCNC: 37 MG/DL — HIGH (ref 7–23)
CALCIUM SERPL-MCNC: 8.5 MG/DL — SIGNIFICANT CHANGE UP (ref 8.4–10.5)
CHLORIDE SERPL-SCNC: 109 MMOL/L — HIGH (ref 96–108)
CO2 SERPL-SCNC: 18 MMOL/L — LOW (ref 22–31)
CREAT SERPL-MCNC: 0.81 MG/DL — SIGNIFICANT CHANGE UP (ref 0.5–1.3)
CRMP-5-IGG WESTERN BLOT: NEGATIVE — SIGNIFICANT CHANGE UP
EGFR: 68 ML/MIN/1.73M2 — SIGNIFICANT CHANGE UP
EGFR: 68 ML/MIN/1.73M2 — SIGNIFICANT CHANGE UP
EOSINOPHIL # BLD AUTO: 0.97 K/UL — HIGH (ref 0–0.5)
EOSINOPHIL NFR BLD AUTO: 17.7 % — HIGH (ref 0–6)
GLIAL NUC TYPE 1 AB TITR SER: NEGATIVE — SIGNIFICANT CHANGE UP
GLUCOSE SERPL-MCNC: 115 MG/DL — HIGH (ref 70–99)
HCT VFR BLD CALC: 23.4 % — LOW (ref 34.5–45)
HGB BLD-MCNC: 7.3 G/DL — LOW (ref 11.5–15.5)
HU1 AB TITR SER: NEGATIVE — SIGNIFICANT CHANGE UP
HU2 AB TITR SER IF: NEGATIVE — SIGNIFICANT CHANGE UP
HU3 AB TITR SER: NEGATIVE — SIGNIFICANT CHANGE UP
IFA NOTES: SIGNIFICANT CHANGE UP
IMM GRANULOCYTES # BLD AUTO: 0.04 K/UL — SIGNIFICANT CHANGE UP (ref 0–0.07)
IMM GRANULOCYTES NFR BLD AUTO: 0.7 % — SIGNIFICANT CHANGE UP (ref 0–0.9)
LYMPHOCYTES # BLD AUTO: 0.79 K/UL — LOW (ref 1–3.3)
LYMPHOCYTES NFR BLD AUTO: 14.4 % — SIGNIFICANT CHANGE UP (ref 13–44)
MAGNESIUM SERPL-MCNC: 1.9 MG/DL — SIGNIFICANT CHANGE UP (ref 1.6–2.6)
MCHC RBC-ENTMCNC: 28.3 PG — SIGNIFICANT CHANGE UP (ref 27–34)
MCHC RBC-ENTMCNC: 31.2 G/DL — LOW (ref 32–36)
MCV RBC AUTO: 90.7 FL — SIGNIFICANT CHANGE UP (ref 80–100)
MONOCYTES # BLD AUTO: 0.53 K/UL — SIGNIFICANT CHANGE UP (ref 0–0.9)
MONOCYTES NFR BLD AUTO: 9.7 % — SIGNIFICANT CHANGE UP (ref 2–14)
NEUTROPHILS # BLD AUTO: 3.15 K/UL — SIGNIFICANT CHANGE UP (ref 1.8–7.4)
NEUTROPHILS NFR BLD AUTO: 57.3 % — SIGNIFICANT CHANGE UP (ref 43–77)
NRBC # BLD AUTO: 0 K/UL — SIGNIFICANT CHANGE UP (ref 0–0)
NRBC # FLD: 0 K/UL — SIGNIFICANT CHANGE UP (ref 0–0)
NRBC BLD AUTO-RTO: 0 /100 WBCS — SIGNIFICANT CHANGE UP (ref 0–0)
PARANEOPLASTIC AB SER-IMP: SIGNIFICANT CHANGE UP
PCA-1 AB TITR SER: NEGATIVE — SIGNIFICANT CHANGE UP
PCA-2 AB TITR SER: NEGATIVE — SIGNIFICANT CHANGE UP
PCA-TR AB TITR SER: NEGATIVE — SIGNIFICANT CHANGE UP
PHOSPHATE SERPL-MCNC: 3.7 MG/DL — SIGNIFICANT CHANGE UP (ref 2.5–4.5)
PLATELET # BLD AUTO: 417 K/UL — HIGH (ref 150–400)
PMV BLD: 9.7 FL — SIGNIFICANT CHANGE UP (ref 7–13)
POTASSIUM SERPL-MCNC: 4.1 MMOL/L — SIGNIFICANT CHANGE UP (ref 3.5–5.3)
POTASSIUM SERPL-SCNC: 4.1 MMOL/L — SIGNIFICANT CHANGE UP (ref 3.5–5.3)
PROT SERPL-MCNC: 7 G/DL — SIGNIFICANT CHANGE UP (ref 6–8.3)
RBC # BLD: 2.58 M/UL — LOW (ref 3.8–5.2)
RBC # FLD: 20.5 % — HIGH (ref 10.3–14.5)
SODIUM SERPL-SCNC: 138 MMOL/L — SIGNIFICANT CHANGE UP (ref 135–145)
WBC # BLD: 5.49 K/UL — SIGNIFICANT CHANGE UP (ref 3.8–10.5)
WBC # FLD AUTO: 5.49 K/UL — SIGNIFICANT CHANGE UP (ref 3.8–10.5)

## 2025-07-16 PROCEDURE — 36415 COLL VENOUS BLD VENIPUNCTURE: CPT

## 2025-07-16 PROCEDURE — 0225U NFCT DS DNA&RNA 21 SARSCOV2: CPT

## 2025-07-16 PROCEDURE — 85027 COMPLETE CBC AUTOMATED: CPT

## 2025-07-16 PROCEDURE — 86901 BLOOD TYPING SEROLOGIC RH(D): CPT

## 2025-07-16 PROCEDURE — 83930 ASSAY OF BLOOD OSMOLALITY: CPT

## 2025-07-16 PROCEDURE — 86850 RBC ANTIBODY SCREEN: CPT

## 2025-07-16 PROCEDURE — 71045 X-RAY EXAM CHEST 1 VIEW: CPT

## 2025-07-16 PROCEDURE — 74018 RADEX ABDOMEN 1 VIEW: CPT

## 2025-07-16 PROCEDURE — 97165 OT EVAL LOW COMPLEX 30 MIN: CPT

## 2025-07-16 PROCEDURE — 84300 ASSAY OF URINE SODIUM: CPT

## 2025-07-16 PROCEDURE — 84100 ASSAY OF PHOSPHORUS: CPT

## 2025-07-16 PROCEDURE — 97116 GAIT TRAINING THERAPY: CPT

## 2025-07-16 PROCEDURE — 86366 MUSCLE-SPECIFIC KINASE ANTB: CPT

## 2025-07-16 PROCEDURE — 94640 AIRWAY INHALATION TREATMENT: CPT

## 2025-07-16 PROCEDURE — 86900 BLOOD TYPING SEROLOGIC ABO: CPT

## 2025-07-16 PROCEDURE — 84156 ASSAY OF PROTEIN URINE: CPT

## 2025-07-16 PROCEDURE — 84295 ASSAY OF SERUM SODIUM: CPT

## 2025-07-16 PROCEDURE — 82962 GLUCOSE BLOOD TEST: CPT

## 2025-07-16 PROCEDURE — 97110 THERAPEUTIC EXERCISES: CPT

## 2025-07-16 PROCEDURE — 86043 ACETYLCHOLN RCPTR MODLG ANTB: CPT

## 2025-07-16 PROCEDURE — 83036 HEMOGLOBIN GLYCOSYLATED A1C: CPT

## 2025-07-16 PROCEDURE — 83735 ASSAY OF MAGNESIUM: CPT

## 2025-07-16 PROCEDURE — 82330 ASSAY OF CALCIUM: CPT

## 2025-07-16 PROCEDURE — 83690 ASSAY OF LIPASE: CPT

## 2025-07-16 PROCEDURE — 86255 FLUORESCENT ANTIBODY SCREEN: CPT

## 2025-07-16 PROCEDURE — 87529 HSV DNA AMP PROBE: CPT

## 2025-07-16 PROCEDURE — 82570 ASSAY OF URINE CREATININE: CPT

## 2025-07-16 PROCEDURE — 99291 CRITICAL CARE FIRST HOUR: CPT

## 2025-07-16 PROCEDURE — 85025 COMPLETE CBC W/AUTO DIFF WBC: CPT

## 2025-07-16 PROCEDURE — 84540 ASSAY OF URINE/UREA-N: CPT

## 2025-07-16 PROCEDURE — 72158 MRI LUMBAR SPINE W/O & W/DYE: CPT

## 2025-07-16 PROCEDURE — 84133 ASSAY OF URINE POTASSIUM: CPT

## 2025-07-16 PROCEDURE — 81001 URINALYSIS AUTO W/SCOPE: CPT

## 2025-07-16 PROCEDURE — 85610 PROTHROMBIN TIME: CPT

## 2025-07-16 PROCEDURE — 87798 DETECT AGENT NOS DNA AMP: CPT

## 2025-07-16 PROCEDURE — 80048 BASIC METABOLIC PNL TOTAL CA: CPT

## 2025-07-16 PROCEDURE — 83516 IMMUNOASSAY NONANTIBODY: CPT

## 2025-07-16 PROCEDURE — 85730 THROMBOPLASTIN TIME PARTIAL: CPT

## 2025-07-16 PROCEDURE — 92610 EVALUATE SWALLOWING FUNCTION: CPT

## 2025-07-16 PROCEDURE — 82550 ASSAY OF CK (CPK): CPT

## 2025-07-16 PROCEDURE — 83605 ASSAY OF LACTIC ACID: CPT

## 2025-07-16 PROCEDURE — 87086 URINE CULTURE/COLONY COUNT: CPT

## 2025-07-16 PROCEDURE — 70553 MRI BRAIN STEM W/O & W/DYE: CPT

## 2025-07-16 PROCEDURE — A9585: CPT

## 2025-07-16 PROCEDURE — 82803 BLOOD GASES ANY COMBINATION: CPT

## 2025-07-16 PROCEDURE — 97535 SELF CARE MNGMENT TRAINING: CPT

## 2025-07-16 PROCEDURE — 97161 PT EVAL LOW COMPLEX 20 MIN: CPT

## 2025-07-16 PROCEDURE — 70450 CT HEAD/BRAIN W/O DYE: CPT

## 2025-07-16 PROCEDURE — 86042 ACETYLCHOLN RCPTR BLCKG ANTB: CPT

## 2025-07-16 PROCEDURE — 74174 CTA ABD&PLVS W/CONTRAST: CPT

## 2025-07-16 PROCEDURE — 97530 THERAPEUTIC ACTIVITIES: CPT

## 2025-07-16 PROCEDURE — 99239 HOSP IP/OBS DSCHRG MGMT >30: CPT

## 2025-07-16 PROCEDURE — 80053 COMPREHEN METABOLIC PANEL: CPT

## 2025-07-16 PROCEDURE — 87040 BLOOD CULTURE FOR BACTERIA: CPT

## 2025-07-16 PROCEDURE — 84132 ASSAY OF SERUM POTASSIUM: CPT

## 2025-07-16 RX ORDER — NYSTATIN 100000 [USP'U]/G
1 CREAM TOPICAL
Qty: 0 | Refills: 0 | DISCHARGE
Start: 2025-07-16

## 2025-07-16 RX ORDER — CEFTRIAXONE 500 MG/1
1 INJECTION, POWDER, FOR SOLUTION INTRAMUSCULAR; INTRAVENOUS
Qty: 4 | Refills: 0
Start: 2025-07-16 | End: 2025-07-19

## 2025-07-16 RX ADMIN — NYSTATIN 1 APPLICATION(S): 100000 CREAM TOPICAL at 06:04

## 2025-07-16 RX ADMIN — IPRATROPIUM BROMIDE AND ALBUTEROL SULFATE 3 MILLILITER(S): .5; 2.5 SOLUTION RESPIRATORY (INHALATION) at 03:08

## 2025-07-16 RX ADMIN — DEXTROMETHORPHAN HBR, GUAIFENESIN 100 MILLIGRAM(S): 200 LIQUID ORAL at 12:00

## 2025-07-16 RX ADMIN — IPRATROPIUM BROMIDE AND ALBUTEROL SULFATE 3 MILLILITER(S): .5; 2.5 SOLUTION RESPIRATORY (INHALATION) at 12:01

## 2025-07-16 RX ADMIN — DEXTROMETHORPHAN HBR, GUAIFENESIN 100 MILLIGRAM(S): 200 LIQUID ORAL at 06:03

## 2025-07-16 RX ADMIN — Medication 1000 MILLIGRAM(S): at 06:03

## 2025-07-16 RX ADMIN — FLUTICASONE PROPIONATE 1 SPRAY(S): 50 SPRAY, METERED NASAL at 06:26

## 2025-07-16 RX ADMIN — Medication 650 MILLIGRAM(S): at 06:04

## 2025-07-16 RX ADMIN — Medication 1 TABLET(S): at 12:01

## 2025-07-16 NOTE — PROGRESS NOTE ADULT - ASSESSMENT
I M    89y F with pmh Alzheimer's dementia, metastatic breast cancer, hypertension, stage 3a chronic kidney disease, hyperlipidemia, cerebral meningioma, hyponatremia, and vitamin D deficiency brought in by EMS for rectal bleeding. CTAP showed no GI bleeding. 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 Also with new weakness, CTH showed no acute intracranial pathology. Course complicated by hypernatremia to 163 which resolved with D5 now at 142. MRI brain showing no acute intracranial pathology, and no brain metastasis. MRI lumbar showed L4 - L5 stenosis with no metastasis,        Problem/Plan - 1:  ·  Problem: Sacral ulcer.   ·  Plan: Patient's daughter started noticing blood in the stool for the past 4 days while changing her diapers. Initially, GI bleed was suspected, but CT angio of abdomen/pelvis was normal and patient's hgb was 12.1 but on examination patient has an oozing sacral decubitus ulcer on examination. Patient has been having difficulty walking due to leg swelling and has been bed bound recently.  Bleeding is likely 2/2 sacral decubitus ulcer.  HSV 2 positive    - local wound care w/ foam dressing.    Problem/Plan - 2:  ·  Problem: Herpes simplex.   ·  Plan: Decubital ulcer PCR positive for HSV2  - continue valtrex 1g PO BID x 10 days (7/7 - 7/16) then reduced to 500mg PO BID for HSV ppx if continuing on chemoRx.    Problem/Plan - 3:  ·  Problem: Weakness.   ·  Plan: - new onset subjective weakness   - non-focal, neuro following   - MRI with/without contrast brain and L spine showing no acute intracranial pathology and no mets to brain or spine. Spine showing stenosis L4 - L5  - CK, Myasthenia Gravis/Lambert-Eaton Myasthenic Syndrome Evaluation, Acetylcholine Modulating AB, Acetylcholine Blocking, LRP4 Autoantibody Test, Musk antibody, Paraneoplastic panel, ganglioside Ab ordered, PND to be done outpatient as per neuro.    Problem/Plan - 4:  ·  Problem: Urinary retention.   ·  Plan: - possible etiology: constipation, KUB with moderate fecal material in colon, miralax and senna ordered  - resolved  - s/p pinzon now on purewick catheter voiding well.    Problem/Plan - 5:  ·  Problem: Hypernatremia.   ·  Plan: Patient has a history of CKD stage 3a and hyponatremia (possibly 2/2 SIADH per outpatient nephro note).  Sodium was 157 on admission, went up to 165 after the administration of 2L of NS.  Patient has 4.3L free water deficit  Renal consulted, given 165mL/hr D5  Na now 136    - resolved.    Problem/Plan - 6:  ·  Problem: Acute kidney injury superimposed on CKD.   ·  Plan: Patient's daughter was told that the patient has CKD but patient never experienced any problems with urination and has appropriate urine output.  On admission: Cr 1.59, , eGFR 30  Now: Cr .85 BUN 40, eGFR 65    - RAFFI resolved.    Problem/Plan - 7:  ·  Problem: Breast cancer.   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 She was deemed as not a candidate for immunotherapy or surgery. She was started on chemotherapy and underwent 3 cycles, last one on 3-June 2025.  Per daughter, patient missed her cycle on [facility]-15 due to difficulty ambulation.  She follows up with Dr. Poly Keith (med onc), Dr. Jesse Villeda (rad onc), and Dr. Mauri Arroyo (breast surgeon)    - Palliative consulted, f/u recs  - Dr. Keith and Dr. Arroyo were notified via email.    Problem/Plan - 8:  ·  Problem: Cough.   ·  Plan: Pt with mild tachypnea and cough  Pt treated for hypernatremia with fluids, lasix 20mg IV given x 1  No more tachypnea  rvp negative 7/12  Pt coughing after eating    - c/w Duoneb q6h  - c/w flonase nasal spray  - C/w robitussin TID.    Problem/Plan - 9:  ·  Problem: Chronic hyponatremia.   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    - CTM.    Problem/Plan - 10:  ·  Problem: Dementia.   ·  Plan; Patient has a history of dementia. A&Ox1 to person only, but daughter states that this is the baseline and denies any deterioration and confusion.    - CTM    #History of cerebral meningioma  - Daughter reports the patient had a brain mass that was removed in 2015.    Problem/Plan - 11:  ·  Problem: Hypertension.   ·  Plan: Patient has a history of left frontal meningioma that was resected in 2015    Patient has a history of hypertension. She was prescribed valsartan 40mg per surescripts but hasn't been taking it for weeks per daughter.  Blood pressure on admission was 90/61 went up to 137/58 after fluid bolus in the ED.    - hold home valsartan    HLD  Patient has a history of hyperlipidemia. She was prescribed atorvastatin 10mg per surescripts  but hasn't been taking it for weeks per daughter.    - hold home meds.    Problem/Plan - 12:  ·  Problem: Prophylactic measure.   ·  Plan: Plan: F: tolerating PO, no IVF  E: replete K<4, Mg<2  N: puree    VTE Prophylaxis: Lovenox  GI: miralax + senna   C: DNR/DNI  D: RMF.

## 2025-07-16 NOTE — PROGRESS NOTE ADULT - PROVIDER SPECIALTY LIST ADULT
Hospitalist
Hospitalist
Rehab Medicine
Infectious Disease
Internal Medicine
Neurology
Neurology
Rehab Medicine
Neurology
Palliative Care
Internal Medicine
Palliative Care
Internal Medicine

## 2025-07-16 NOTE — PROGRESS NOTE ADULT - SUBJECTIVE AND OBJECTIVE BOX
Physical Medicine and Rehabilitation Progress Note :       Patient is a 89y old  Female who presents with a chief complaint of Rectal bleeding (16 Jul 2025 07:12)      HPI:  89y F with pmh dementia, metastatic breast cancer, hypertension, stage 3a chronic kidney disease, hyperlipidemia, cerebral meningioma, hyponatremia, and vitamin D deficiency brought in by EMS for rectal bleeding.  The patient's daughter reports noticing blood (maroon and dark in nature) in the stool for the past 4 days and denies associated nausea, vomiting, or diarrhea. The daughter reports that the patient had an ulcer on her lower back for 2 weeks and that she occasionally scratches it. The patient hasn't been eating for the past week and has been given ensure and soup by he daughter.  The patient was started on CMF chemotherapy regimen received 3 cycles of treatment  with the last one being on Betina-3-2025. She was scheduled for her next cycle on Betina 15 but was unable to attend to it as she has been having difficulty ambulating and became bedbound since that time,  She had rectal bleeding in 2021 and underwent colonoscopy which showed multiple polyps, all of which were removed.  She follows up with Dr. Tyson Schumacher (PCP), Dr. Poly Keith (med onc), Dr. Jesse Villeda (rad onc), and Dr. Mauri Arroyo (breast surgeon)      In the ED:  Vitals: T 97.9, BP 90/61, HR 96, RR 20, SpO2 98% (RA)  Labs: MCHC 28.6 (H), smudge cells, lactate 2.4, sodium 157, , Cr 1.59, eGFR 30, glucose 124, protein total 9.4 , albumin 2.7, Mg 3.1  Urine: cloudy, protein 30, RBC 99, WBC 7 , moderate leukocyte esterase, few bacteria  CXR: normal  Imaging: CT scan showed no evidence of active gastrointestinal bleeding. Partially visualized left breast mass largest measuring up to 4.7cm, and increase in size  2L fluids (05 Jul 2025 09:01)                            7.3    5.49  )-----------( 417      ( 16 Jul 2025 05:30 )             23.4       07-16    138  |  109[H]  |  37[H]  ----------------------------<  115[H]  4.1   |  18[L]  |  0.81    Ca    8.5      16 Jul 2025 05:30  Phos  3.7     07-16  Mg     1.9     07-16    TPro  7.0  /  Alb  2.2[L]  /  TBili  0.2  /  DBili  x   /  AST  48[H]  /  ALT  39  /  AlkPhos  113  07-16    Vital Signs Last 24 Hrs  T(C): 36.5 (16 Jul 2025 05:41), Max: 38.6 (15 Jul 2025 13:41)  T(F): 97.7 (16 Jul 2025 05:41), Max: 101.4 (15 Jul 2025 13:41)  HR: 90 (16 Jul 2025 05:41) (90 - 96)  BP: 105/62 (16 Jul 2025 05:41) (105/62 - 113/88)  BP(mean): 96 (15 Jul 2025 20:43) (96 - 96)  RR: 18 (16 Jul 2025 05:41) (18 - 18)  SpO2: 100% (16 Jul 2025 05:41) (96% - 100%)    Parameters below as of 16 Jul 2025 05:41  Patient On (Oxygen Delivery Method): room air        MEDICATIONS  (STANDING):  acetaminophen     Tablet .. 650 milliGRAM(s) Oral <User Schedule>  albuterol/ipratropium for Nebulization 3 milliLiter(s) Nebulizer every 6 hours  cefTRIAXone   IVPB 1000 milliGRAM(s) IV Intermittent every 24 hours  dextrose 5%. 1000 milliLiter(s) (100 mL/Hr) IV Continuous <Continuous>  dextrose 5%. 1000 milliLiter(s) (50 mL/Hr) IV Continuous <Continuous>  dextrose 50% Injectable 25 Gram(s) IV Push once  dextrose 50% Injectable 12.5 Gram(s) IV Push once  dextrose 50% Injectable 25 Gram(s) IV Push once  enoxaparin Injectable 40 milliGRAM(s) SubCutaneous every 24 hours  fluticasone propionate 50 MICROgram(s)/spray Nasal Spray 1 Spray(s) Both Nostrils two times a day  glucagon  Injectable 1 milliGRAM(s) IntraMuscular once  guaiFENesin Oral Liquid (Sugar-Free) 100 milliGRAM(s) Oral every 6 hours  multivitamin 1 Tablet(s) Oral daily  nystatin Powder 1 Application(s) Topical every 12 hours  polyethylene glycol 3350 17 Gram(s) Oral every 12 hours  senna 2 Tablet(s) Oral at bedtime  valACYclovir 1000 milliGRAM(s) Oral two times a day    MEDICATIONS  (PRN):  acetaminophen     Tablet .. 650 milliGRAM(s) Oral every 6 hours PRN Temp greater or equal to 38C (100.4F), Mild Pain (1 - 3)  aluminum hydroxide/magnesium hydroxide/simethicone Suspension 30 milliLiter(s) Oral every 4 hours PRN Dyspepsia  benzonatate 100 milliGRAM(s) Oral three times a day PRN Cough  dextrose Oral Gel 15 Gram(s) Oral once PRN Blood Glucose LESS THAN 70 milliGRAM(s)/deciliter  melatonin 3 milliGRAM(s) Oral at bedtime PRN Insomnia      T(C): 36.5 (07-16-25 @ 05:41), Max: 38.6 (07-15-25 @ 13:41)  HR: 90 (07-16-25 @ 05:41) (90 - 96)  BP: 105/62 (07-16-25 @ 05:41) (105/62 - 113/88)  RR: 18 (07-16-25 @ 05:41) (18 - 18)  SpO2: 100% (07-16-25 @ 05:41) (96% - 100%)    Physical Exam:    89 y o woman  lying comfortably in semi Rodriguez's position , somnolent ,  NAD     Head: normocephalic , atraumatic    Eyes: PERRLA , EOMI , no nystagmus , sclera anicteric    ENT / FACE: neg nasal discharge , uvula midline , no oropharyngeal erythema / exudate    Neck: supple , negative JVD , negative carotid bruits , no thyromegaly    Chest: CTA bilaterally      Cardiovascular: regular rate and rhythm , neg murmurs / rubs / gallops    Abdomen: soft , non distended , no tenderness to palpation in all 4 quadrants ,  normal bowel sounds     Extremities: WWP , neg cyanosis /clubbing / edema     Neurologic Exam:     somnolent and oriented to person     Cranial Nerves:           II:                         pupils equal , round and reactive to light , visual fields intact         III/ IV/VI:             extraocular movements intact , neg nystagmus , neg ptosis        V:                        facial sensation intact , V1-3 normal        VII:                      face symmetric , no droop , normal eye closure and smile        VIII:                     hearing intact to finger rub bilaterally        IX and X:             no hoarseness , gag intact , palate/ uvula rise symmetrically        XI:                       SCM / trapezius strength intact bilateral        XII:                      no tongue deviation    Motor Exam:        poor effort 2/2 mental status > 3/5 x 4 extremities     Sensation:         withdraws to pinch  x 4 extremities     DTR:           biceps/brachioradialis: equal                            patella/ankle: equal          neg Babinski       7/15/2025 Functional Status Assessment :       Pain Assessment/Number Scale (0-10) Adult  Presence of Pain: denies pain/discomfort (Rating = 0)  Pain Rating (0-10): Rest: 0 (no pain/absence of nonverbal indicators of pain)  Pain Rating (0-10): Activity: 0 (no pain/absence of nonverbal indicators of pain)    Safety      AM-PAC Functional Assessment: Basic Mobility  Type of Assessment: Daily assessment  Turning from your back to your side while in a flat bed without using bedrails?: 2 = A lot of assistance  Moving from lying on your back to sitting on the flat side of a flat bed without using bedrails?: 2 = A lot of assistance  Moving to and from a bed to a chair (including a wheelchair)?: 2 = A lot of assistance  Standing up from a chair using your arms (e.g. wheelchair or bedside chair)?: 2 = A lot of assistance  Walking in hospital room?: 2 = A lot of assistance  Climbing 3-5 steps with a railing?: 2 = A lot of assistance     Score: 12   Row Comment: Ask the patient "How much help from another person do you currently need? (If the patient hasn't done an activity recently, how much help from another person do you think he/she needs if he/she tried?)    Cognitive/Neuro      Cognitive/Neuro/Behavioral  Level of Consciousness: alert;  confused  Arousal Level: opens eyes spontaneously  Orientation: disoriented to;  place;  situation;  time  Speech: spontaneous  Mood/Behavior: calm;  cooperative    Language Assistance  Preferred Language to Address Healthcare Preferred Language to Address Healthcare: English    Therapeutic Interventions      Bed Mobility  Bed Mobility Training Rolling/Turning: moderate assist (50% patient effort);  1 person assist;  verbal cues;  bed rails  Bed Mobility Training Scooting: moderate assist (50% patient effort);  1 person assist  Bed Mobility Training Sit-to-Supine: moderate assist (50% patient effort);  2 person assist;  verbal cues;  nonverbal cues (demo/gestures);  maximum assist (25% patient effort)  Bed Mobility Training Supine-to-Sit: 2 person assist;  verbal cues;  nonverbal cues (demo/gestures);  moderate assist (50% patient effort)  Bed Mobility Training Limitations: decreased flexibility;  decreased strength;  impaired balance;  impaired motor control;  impaired postural control;  dec aerobic capacity/endurance     Sit-Stand Transfer Training  Transfer Training Sit-to-Stand Transfer: maximum assist (25% patient effort);  2 person assist;  verbal cues;  nonverbal cues (demo/gestures);  full weight-bearing   hand held assist ;  Pt unable to clear glutes from bed   Sit-to-Stand Transfer Training Transfer Safety Analysis: decreased weight-shifting ability;  decreased flexibility;  decreased strength;  impaired balance;  impaired motor control;  impaired postural control;  dec aerobic capacity/endurance ;  hand held assist     Therapeutic Exercise  Therapeutic Exercise Detail: Pt dangled at EOB ~8 minutes to participate in seated therex. She demos fair+ sitting balance. Seated therex: LAQ x 10 each LE, ankle pumps x 10 each LE, functional reach x 2. Pt engaged in grooming task with Sandy (using wet wipe to wipe face).             PM&R Impression : as above    Current disposition plan recommendation :    subacute rehab placement

## 2025-07-16 NOTE — PROGRESS NOTE ADULT - PROBLEM SELECTOR PLAN 6
Patient's daughter was told that the patient has CKD but patient never experienced any problems with urination and has appropriate urine output.  On admission: Cr 1.59, , eGFR 30  Now: Cr .85 BUN 40, eGFR 65    - RAFFI resolved Patient's daughter was told that the patient has CKD but patient never experienced any problems with urination and has appropriate urine output.  On admission: Cr 1.59, , eGFR 30  Now: Cr .85 BUN 40, eGFR 65    - RAFFI resolved    #Urinary retention  - possible etiology: constipation, KUB with moderate fecal material in colon, miralax and senna ordered  - resolved  - s/p pinzon now on purewick catheter voiding well

## 2025-07-16 NOTE — PROGRESS NOTE ADULT - PROBLEM SELECTOR PLAN 4
- possible etiology: constipation, KUB with moderate fecal material in colon, miralax and senna ordered  - resolved  - s/p pinzon now on purewick catheter voiding well #Aspiration pneumonitis  #Concern for aspiration pneumonia  CXR with LLL opacity    - Continue ceftriaxone 1g IV QD x 4 days

## 2025-07-16 NOTE — PROGRESS NOTE ADULT - PROBLEM/PLAN-5
DISPLAY PLAN FREE TEXT
(3) adequate

## 2025-07-16 NOTE — PROGRESS NOTE ADULT - PROBLEM SELECTOR PLAN 5
Patient has a history of CKD stage 3a and hyponatremia (possibly 2/2 SIADH per outpatient nephro note).  Sodium was 157 on admission, went up to 165 after the administration of 2L of NS.  Patient has 4.3L free water deficit  Renal consulted, given 165mL/hr D5  Na now 136    - resolved

## 2025-07-16 NOTE — DISCHARGE NOTE NURSING/CASE MANAGEMENT/SOCIAL WORK - FINANCIAL ASSISTANCE
[facility] provides services at a reduced cost to those who are determined to be eligible through [facility]’s financial assistance program. Information regarding [facility]’s financial assistance program can be found by going to [email or web address] or by calling [phone].

## 2025-07-16 NOTE — PROGRESS NOTE ADULT - ATTENDING COMMENTS
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 herpetic looking) and found to have ME/FTT with hyponatremia, RAFFI on CKD3a and functional quadriplegia Vs gait abnormality /bed bound for 5 days.-     24 hours events, reported having temp 101 ( rectal - since she spit out thermometer placed orally) tachy thus EKG obtained 7/15- x 2 NSR with HR 98--> 96 given  cc x 2 - for poor oral intake and relative hypotension, blood cx drawn, concern of aspiration, chest x ray done ?  LLL infiltrate -started on Ceftriazone, UA likely is contaminated, given tylenol for possible pain   she responded to the treatment   this am encounter time around 9 am   she is awake, alert and chatty  do not endorse any pain -   remain afebrile , vitals stable.   RRR, moving good air bilaterally,       multiple comorbs.  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   - MRI lumber spine showed no enhancement of thecal sac, pt is clinically better from ME/corrected hypernatremia and RAFFI  - ID consult appreciated  Dr. Dorsey, agreed to d/c acyclovir and resume valtrex 1g PO bid for total 7 days for HSV sacral ulcer   -  Valtrex 1g PO bid for 10 days ( 7/7-7/16) then reduced to 500mg po bid for HSV ppx if continuing on chemoRx   - Neuro f/u appreicated   - local wound care w/ foam dressing    # Hx Hyponatremia/SIADH  # Hypernatremia ( corrected) -most recent sodium 135 (7/11-  # RAFFI corrected ( likely 2/2 prerenal azotemia Vs ischemic ATN) on CKD3a  # acute urinary retention likely 2/2 constipation( moderate stool burden seen on CT)   - Renal f/u appreciated  sp pinzon -now on purewick catheter.   voiding well.     - bowel regimen for constipation; c/w Senna 2 tabs qHS and miralax 17g PO daily ok  # FTT likely related to chemoRx   # Protein Calorie malnutrition (albumin 2.6-2.7)   - Nutrition consult   - Ensure max protein (Vanilla) bid   - puree diet     # Deconditioning   # Functional quadriplegia   # gait abnormality to rule out Brain mets Vs stroke Vs medical induced neuropathy , doubt Elsberg syndrome   - Neuro consult appreciated   - CTH negative( 7/6)  - MRI brain w/wo contrast and MRI lumber spine w/wo contrast ( 7/7) negative brain/spine mets, no enhancement of thecal sac  - CK 77  - fu on the extensive labs sent for weakness -- so far negative.   - Fall precautions   - PT eval reviewed, required 2 people assistance and unable to full stand  daughter, Yoli whom now agreed to JEREMY placement   - to give Tylenol 650 mg po tid standing for 2 days for pain ( musculoskeletal)     # Stage IIIc/T3N2/Mo triple negative IDC L breast ( TNBC)  - undergoing neoadjuvant chemo; CMF s/p 3 cycles ( 4/15, 5/13 and 6/3)    - Breast surgeon Dr. Mauri arroyo, Med Onc Dr. Poly Keith  - Palliative care consult appreciated for [facility]     # Alzheimer's dementia wo behavioral disturbance  - AAOx1  - SLP eval appreciated rec: puree w/ thin   - delirium precaution     # saturating well on room air,   # possible aspiration pneumonia- given ceftriazone 7/15- to give 5 days course, clinically stable , saturating well on room air, speaking full sentence.  aspiration precaution and spoon ( hand to hand feeding )    cw duanebs q 6 hourly  flonase nasal spray bid to continue  rvp negative 7/12, 7/15  anti-tussive to continue.   # DVT ppx:  Lovenox     # Advance directives: DNR/DNI (MOLST form filled out) [facility]  no heroic measures i.e. CPR or intubation in an event required resuscitation.   Informed Dr. Arroyo and Dr. Keith about admission     Contacts:  legal guardian( daughter): Bozena Burton) Tomasa [phone]  PMD Dr.Daniel Schumacher  Breast Surgeon: Dr.Paul Arroyo   Med Onc Dr. Poly Keith   Rad Onc Dr. Jesse Villeda     # Disposition: medically ready, plan for JEREMY placement   dw medical team 89y F Shanghainese/Cantonese/limited English, b/l  hearing impairment (better hearing on left ear), with PMHx of Alzheimer's dementia without behavioral disturbance, AAOx1, partial ADL's, gait disturbance (RW assist, was able to walk 3 weeks prior to admission now bed bound for 5 days),hx left frontal craniotomy for cerebral meningioma ( 2015), Obesity (BMI 29.7), HTN, HLD, CKD3a, hx colonic polyps, moderate internal hemorrhoid and rectal prolapse (2021), hx hyponatremia 2/2 SIADH, recently diagnosed stage IIIc (T3N2Mo) triple negative ( ER-/[place]-/HER2-) intraductal carcinoma( IDC) of left breast ~ 4.7cm ( 2/2025) followed by Dr.Paul Arroyo/Dr.Bonnie Keith receiving neoadjuvant chemoRx w/ CMF ( cyclophosphamide/methotrexate/5-FU started 4/15/25, s/p cycle 3 on 6/3 out of 8 cycles) now BIBEMS for rectal bleeding likely 2/2 sacral ulcer (? herpetic looking) and found to have ME/FTT with hyponatremia, RAFFI on CKD3a and functional quadriplegia Vs gait abnormality /bed bound for 5 days.-     24 hours events, reported having temp 101 ( rectal - since she spit out thermometer placed orally) tachy thus EKG obtained 7/15- x 2 NSR with HR 98--> 96 given  cc x 2 - for poor oral intake and relative hypotension, blood cx drawn, concern of aspiration, chest x ray done ?  LLL infiltrate -started on Ceftriazone, UA likely is contaminated, given tylenol for possible pain   she responded to the treatment   this am encounter time around 9 am   she is awake, alert and chatty  do not endorse any pain -   remain afebrile , vitals stable.   RRR, moving good air bilaterally,       multiple comorbs.  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   - MRI lumber spine showed no enhancement of thecal sac, pt is clinically better from ME/corrected hypernatremia and RAFFI  - ID consult appreciated  Dr. Dorsey, agreed to d/c acyclovir and resume valtrex 1g PO bid for total 7 days for HSV sacral ulcer   -  Valtrex 1g PO bid for 10 days ( 7/7-7/16) then reduced to 500mg po bid for HSV ppx if continuing on chemoRx   - Neuro f/u appreicated   - local wound care w/ foam dressing    # Hx Hyponatremia/SIADH  # Hypernatremia ( corrected) -most recent sodium 135 (7/11-  # RAFFI corrected ( likely 2/2 prerenal azotemia Vs ischemic ATN) on CKD3a  # acute urinary retention likely 2/2 constipation( moderate stool burden seen on CT)   - Renal f/u appreciated  sp pinzon -now on purewick catheter.   voiding well.     - bowel regimen for constipation; c/w Senna 2 tabs qHS and miralax 17g PO daily ok  # FTT likely related to chemoRx   # Protein Calorie malnutrition (albumin 2.6-2.7)   - Nutrition consult   - Ensure max protein (Vanilla) bid   - puree diet     # Deconditioning   # Functional quadriplegia   # gait abnormality to rule out Brain mets Vs stroke Vs medical induced neuropathy , doubt Elsberg syndrome   - Neuro consult appreciated   - CTH negative( 7/6)  - MRI brain w/wo contrast and MRI lumber spine w/wo contrast ( 7/7) negative brain/spine mets, no enhancement of thecal sac  - CK 77  - fu on the extensive labs sent for weakness -- so far negative.   - Fall precautions   - PT eval reviewed, required 2 people assistance and unable to full stand  daughter, Yoli whom now agreed to JEREMY placement   - to give Tylenol 650 mg po tid standing for 2 days for pain ( musculoskeletal)     # Stage IIIc/T3N2/Mo triple negative IDC L breast ( TNBC)  - undergoing neoadjuvant chemo; CMF s/p 3 cycles ( 4/15, 5/13 and 6/3)    - Breast surgeon Dr. Mauri arroyo, Med Onc Dr. Poly Keith  - Palliative care consult appreciated for [facility]     # Alzheimer's dementia wo behavioral disturbance  - AAOx1  - SLP eval appreciated rec: puree w/ thin   - delirium precaution     # saturating well on room air,   # possible aspiration pneumonia- given ceftriazone 7/15- to give 5 days course, clinically stable , saturating well on room air, speaking full sentence.  aspiration precaution and spoon ( hand to hand feeding )    cw duanebs q 6 hourly  flonase nasal spray bid to continue  rvp negative 7/12, 7/15  anti-tussive to continue.   # DVT ppx:  Lovenox     # Advance directives: DNR/DNI (MOLST form filled out) [facility]  no heroic measures i.e. CPR or intubation in an event required resuscitation.       Contacts:  legal guardian( daughter): Bozena Burton) Tomasa [phone]  PMD Dr.Daniel Schumacher  Breast Surgeon: Dr.Paul Arroyo   Med Onc Dr. Poly Keith   Rad Onc Dr. Jesse Villeda     # Disposition: medically ready, plan for JEREMY placement   dw medical team

## 2025-07-16 NOTE — PROGRESS NOTE ADULT - TIME BILLING
Emotional Support/Supportive Care and Clarification of Potential Disease Trajectory related to dementia, breast cancer  Assessment of Symptom [place] and Palliative regimen  Time exclusive of ACP discussion  Time inclusive of chart review, medication ordering, discussion with primary team, clinical documentation, and communication with family/caregiver
HSV-2
Emotional Support/Supportive Care and Clarification of Potential Disease Trajectory related to dementia, met br CA   Assessment of Symptom [place] and Palliative regimen  Time exclusive of ACP discussion  Time inclusive of chart review, medication ordering, discussion with primary team, clinical documentation, and communication with family/caregiver
review of labs/imaing, care
preparation to see patient, history taking, physical exam, discussion with patient, discussion with other care providers, independent reviewing and interpretation of the data, care coordination.

## 2025-07-16 NOTE — PROGRESS NOTE ADULT - REASON FOR ADMISSION
Rectal bleeding

## 2025-07-16 NOTE — DISCHARGE NOTE NURSING/CASE MANAGEMENT/SOCIAL WORK - PATIENT PORTAL LINK FT
You can access the FollowMyHealth Patient Portal offered by [facility] by registering at the following website: [email or web address]. By joining [employer]’s FollowMyHealth portal, you will also be able to view your health information using other applications (apps) compatible with our system.

## 2025-07-16 NOTE — PROGRESS NOTE ADULT - PROBLEM SELECTOR PLAN 8
Pt with mild tachypnea and cough  Pt treated for hypernatremia with fluids, lasix 20mg IV given x 1  No more tachypnea  rvp negative 7/12  Pt coughing after eating    - c/w Duoneb q6h  - c/w flonase nasal spray  - C/w robitussin TID

## 2025-07-16 NOTE — PROGRESS NOTE ADULT - SUBJECTIVE AND OBJECTIVE BOX
OVERNIGHT EVENTS: pt refused shiv    SUBJECTIVE:  Patient seen and examined at bedside. Pt is sitting comfortably, alert, talkative. Pt denies any pain, states she is feeling well    Vital Signs Last 12 Hrs  T(F): 98.8 (07-14-25 @ 05:39), Max: 99.1 (07-13-25 @ 20:54)  HR: 92 (07-14-25 @ 05:39) (91 - 92)  BP: 127/70 (07-14-25 @ 05:39) (112/63 - 127/70)  BP(mean): 80 (07-13-25 @ 20:54) (80 - 80)  RR: 18 (07-14-25 @ 05:39) (18 - 18)  SpO2: 98% (07-14-25 @ 05:39) (97% - 98%)  I&O's Summary    12 Jul 2025 07:01  -  13 Jul 2025 07:00  --------------------------------------------------------  IN: 0 mL / OUT: 500 mL / NET: -500 mL        PHYSICAL EXAM:  Constitutional: NAD, comfortable in bed.  Respiratory: CTA B/L. No w/r/r.   Cardiovascular: RRR, normal S1 and S2, no m/r/g.   Gastrointestinal: soft NTND, no palpable masses   Extremities: wwp; no cyanosis, clubbing or edema.   Vascular: Pulses equal and strong throughout.   Neurological: no obvious CN deficits  Skin: No gross skin abnormalities or rashes        LABS:                        7.7    7.37  )-----------( 338      ( 13 Jul 2025 07:08 )             24.6     07-13    136  |  109[H]  |  45[H]  ----------------------------<  124[H]  3.8   |  16[L]  |  0.91    Ca    7.9[L]      13 Jul 2025 07:08  Phos  3.4     07-13  Mg     2.0     07-13    TPro  6.9  /  Alb  2.1[L]  /  TBili  0.3  /  DBili  x   /  AST  30  /  ALT  25  /  AlkPhos  84  07-13      Urinalysis Basic - ( 13 Jul 2025 07:08 )    Color: x / Appearance: x / SG: x / pH: x  Gluc: 124 mg/dL / Ketone: x  / Bili: x / Urobili: x   Blood: x / Protein: x / Nitrite: x   Leuk Esterase: x / RBC: x / WBC x   Sq Epi: x / Non Sq Epi: x / Bacteria: x          RADIOLOGY & ADDITIONAL TESTS:    MEDICATIONS  (STANDING):  albuterol/ipratropium for Nebulization 3 milliLiter(s) Nebulizer every 6 hours  dextrose 5%. 1000 milliLiter(s) (50 mL/Hr) IV Continuous <Continuous>  dextrose 5%. 1000 milliLiter(s) (100 mL/Hr) IV Continuous <Continuous>  dextrose 50% Injectable 25 Gram(s) IV Push once  dextrose 50% Injectable 12.5 Gram(s) IV Push once  dextrose 50% Injectable 25 Gram(s) IV Push once  enoxaparin Injectable 40 milliGRAM(s) SubCutaneous every 24 hours  fluticasone propionate 50 MICROgram(s)/spray Nasal Spray 1 Spray(s) Both Nostrils two times a day  glucagon  Injectable 1 milliGRAM(s) IntraMuscular once  multivitamin 1 Tablet(s) Oral daily  nystatin Powder 1 Application(s) Topical every 12 hours  polyethylene glycol 3350 17 Gram(s) Oral every 12 hours  senna 2 Tablet(s) Oral at bedtime  valACYclovir 1000 milliGRAM(s) Oral two times a day    MEDICATIONS  (PRN):  acetaminophen     Tablet .. 650 milliGRAM(s) Oral every 6 hours PRN Temp greater or equal to 38C (100.4F), Mild Pain (1 - 3)  aluminum hydroxide/magnesium hydroxide/simethicone Suspension 30 milliLiter(s) Oral every 4 hours PRN Dyspepsia  benzonatate 100 milliGRAM(s) Oral three times a day PRN Cough  dextrose Oral Gel 15 Gram(s) Oral once PRN Blood Glucose LESS THAN 70 milliGRAM(s)/deciliter  melatonin 3 milliGRAM(s) Oral at bedtime PRN Insomnia

## 2025-07-20 LAB
CULTURE RESULTS: SIGNIFICANT CHANGE UP
SPECIMEN SOURCE: SIGNIFICANT CHANGE UP

## 2025-07-22 DIAGNOSIS — J69.0 PNEUMONITIS DUE TO INHALATION OF FOOD AND VOMIT: ICD-10-CM

## 2025-07-22 DIAGNOSIS — K59.00 CONSTIPATION, UNSPECIFIED: ICD-10-CM

## 2025-07-22 DIAGNOSIS — F02.80 DEMENTIA IN OTHER DISEASES CLASSIFIED ELSEWHERE, UNSPECIFIED SEVERITY, WITHOUT BEHAVIORAL DISTURBANCE, PSYCHOTIC DISTURBANCE, MOOD DISTURBANCE, AND ANXIETY: ICD-10-CM

## 2025-07-22 DIAGNOSIS — Z66 DO NOT RESUSCITATE: ICD-10-CM

## 2025-07-22 DIAGNOSIS — E55.9 VITAMIN D DEFICIENCY, UNSPECIFIED: ICD-10-CM

## 2025-07-22 DIAGNOSIS — I12.9 HYPERTENSIVE CHRONIC KIDNEY DISEASE WITH STAGE 1 THROUGH STAGE 4 CHRONIC KIDNEY DISEASE, OR UNSPECIFIED CHRONIC KIDNEY DISEASE: ICD-10-CM

## 2025-07-22 DIAGNOSIS — R53.2 FUNCTIONAL QUADRIPLEGIA: ICD-10-CM

## 2025-07-22 DIAGNOSIS — B00.89 OTHER HERPESVIRAL INFECTION: ICD-10-CM

## 2025-07-22 DIAGNOSIS — N17.9 ACUTE KIDNEY FAILURE, UNSPECIFIED: ICD-10-CM

## 2025-07-22 DIAGNOSIS — E78.5 HYPERLIPIDEMIA, UNSPECIFIED: ICD-10-CM

## 2025-07-22 DIAGNOSIS — Z74.01 BED CONFINEMENT STATUS: ICD-10-CM

## 2025-07-22 DIAGNOSIS — R33.9 RETENTION OF URINE, UNSPECIFIED: ICD-10-CM

## 2025-07-22 DIAGNOSIS — G93.41 METABOLIC ENCEPHALOPATHY: ICD-10-CM

## 2025-07-22 DIAGNOSIS — G30.9 ALZHEIMER'S DISEASE, UNSPECIFIED: ICD-10-CM

## 2025-07-22 DIAGNOSIS — H91.90 UNSPECIFIED HEARING LOSS, UNSPECIFIED EAR: ICD-10-CM

## 2025-07-22 DIAGNOSIS — L89.152 PRESSURE ULCER OF SACRAL REGION, STAGE 2: ICD-10-CM

## 2025-07-22 DIAGNOSIS — R05.9 COUGH, UNSPECIFIED: ICD-10-CM

## 2025-07-22 DIAGNOSIS — K62.5 HEMORRHAGE OF ANUS AND RECTUM: ICD-10-CM

## 2025-07-22 DIAGNOSIS — R62.7 ADULT FAILURE TO THRIVE: ICD-10-CM

## 2025-07-22 DIAGNOSIS — C50.919 MALIGNANT NEOPLASM OF UNSPECIFIED SITE OF UNSPECIFIED FEMALE BREAST: ICD-10-CM

## 2025-07-22 DIAGNOSIS — C77.9 SECONDARY AND UNSPECIFIED MALIGNANT NEOPLASM OF LYMPH NODE, UNSPECIFIED: ICD-10-CM

## 2025-07-22 DIAGNOSIS — E43 UNSPECIFIED SEVERE PROTEIN-CALORIE MALNUTRITION: ICD-10-CM

## 2025-07-22 DIAGNOSIS — Z92.21 PERSONAL HISTORY OF ANTINEOPLASTIC CHEMOTHERAPY: ICD-10-CM

## 2025-07-22 DIAGNOSIS — E22.2 SYNDROME OF INAPPROPRIATE SECRETION OF ANTIDIURETIC HORMONE: ICD-10-CM

## 2025-07-26 LAB
LRP4 AUTOANTIBODY TEST: SIGNIFICANT CHANGE UP
MUSK IGG SER IA-MCNC: SIGNIFICANT CHANGE UP

## 2025-08-05 ENCOUNTER — APPOINTMENT (OUTPATIENT)
Dept: INFUSION THERAPY | Facility: CLINIC | Age: 89
End: 2025-08-05

## 2025-08-12 ENCOUNTER — TRANSCRIPTION ENCOUNTER (OUTPATIENT)
Age: 89
End: 2025-08-12

## 2025-08-26 ENCOUNTER — APPOINTMENT (OUTPATIENT)
Dept: INFUSION THERAPY | Facility: CLINIC | Age: 89
End: 2025-08-26

## 2025-09-01 ENCOUNTER — EMERGENCY (EMERGENCY)
Facility: HOSPITAL | Age: 89
LOS: 1 days | End: 2025-09-01
Attending: STUDENT IN AN ORGANIZED HEALTH CARE EDUCATION/TRAINING PROGRAM | Admitting: STUDENT IN AN ORGANIZED HEALTH CARE EDUCATION/TRAINING PROGRAM
Payer: MEDICARE

## 2025-09-01 VITALS
HEIGHT: 59 IN | RESPIRATION RATE: 16 BRPM | SYSTOLIC BLOOD PRESSURE: 130 MMHG | DIASTOLIC BLOOD PRESSURE: 78 MMHG | HEART RATE: 66 BPM | TEMPERATURE: 98 F | OXYGEN SATURATION: 100 %

## 2025-09-01 LAB
ANION GAP SERPL CALC-SCNC: 10 MMOL/L — SIGNIFICANT CHANGE UP (ref 5–17)
BASOPHILS # BLD AUTO: 0.06 K/UL — SIGNIFICANT CHANGE UP (ref 0–0.2)
BASOPHILS NFR BLD AUTO: 1.1 % — SIGNIFICANT CHANGE UP (ref 0–2)
BUN SERPL-MCNC: 26 MG/DL — HIGH (ref 7–23)
CALCIUM SERPL-MCNC: 8.6 MG/DL — SIGNIFICANT CHANGE UP (ref 8.4–10.5)
CHLORIDE SERPL-SCNC: 94 MMOL/L — LOW (ref 96–108)
CO2 SERPL-SCNC: 25 MMOL/L — SIGNIFICANT CHANGE UP (ref 22–31)
CREAT SERPL-MCNC: 0.9 MG/DL — SIGNIFICANT CHANGE UP (ref 0.5–1.3)
EGFR: 60 ML/MIN/1.73M2 — SIGNIFICANT CHANGE UP
EGFR: 60 ML/MIN/1.73M2 — SIGNIFICANT CHANGE UP
EOSINOPHIL # BLD AUTO: 0.38 K/UL — SIGNIFICANT CHANGE UP (ref 0–0.5)
EOSINOPHIL NFR BLD AUTO: 7.2 % — HIGH (ref 0–6)
FLUAV AG NPH QL: SIGNIFICANT CHANGE UP
FLUBV AG NPH QL: SIGNIFICANT CHANGE UP
GLUCOSE SERPL-MCNC: 92 MG/DL — SIGNIFICANT CHANGE UP (ref 70–99)
HCT VFR BLD CALC: 27.4 % — LOW (ref 34.5–45)
HGB BLD-MCNC: 8.7 G/DL — LOW (ref 11.5–15.5)
IMM GRANULOCYTES # BLD AUTO: 0.04 K/UL — SIGNIFICANT CHANGE UP (ref 0–0.07)
IMM GRANULOCYTES NFR BLD AUTO: 0.8 % — SIGNIFICANT CHANGE UP (ref 0–0.9)
LYMPHOCYTES # BLD AUTO: 1.12 K/UL — SIGNIFICANT CHANGE UP (ref 1–3.3)
LYMPHOCYTES NFR BLD AUTO: 21.3 % — SIGNIFICANT CHANGE UP (ref 13–44)
MAGNESIUM SERPL-MCNC: 1.8 MG/DL — SIGNIFICANT CHANGE UP (ref 1.6–2.6)
MCHC RBC-ENTMCNC: 30.5 PG — SIGNIFICANT CHANGE UP (ref 27–34)
MCHC RBC-ENTMCNC: 31.8 G/DL — LOW (ref 32–36)
MCV RBC AUTO: 96.1 FL — SIGNIFICANT CHANGE UP (ref 80–100)
MONOCYTES # BLD AUTO: 0.89 K/UL — SIGNIFICANT CHANGE UP (ref 0–0.9)
MONOCYTES NFR BLD AUTO: 16.9 % — HIGH (ref 2–14)
NEUTROPHILS # BLD AUTO: 2.78 K/UL — SIGNIFICANT CHANGE UP (ref 1.8–7.4)
NEUTROPHILS NFR BLD AUTO: 52.7 % — SIGNIFICANT CHANGE UP (ref 43–77)
NRBC # BLD AUTO: 0 K/UL — SIGNIFICANT CHANGE UP (ref 0–0)
NRBC # FLD: 0 K/UL — SIGNIFICANT CHANGE UP (ref 0–0)
NRBC BLD AUTO-RTO: 0 /100 WBCS — SIGNIFICANT CHANGE UP (ref 0–0)
PLATELET # BLD AUTO: 492 K/UL — HIGH (ref 150–400)
PMV BLD: 9.1 FL — SIGNIFICANT CHANGE UP (ref 7–13)
POTASSIUM SERPL-MCNC: 4.6 MMOL/L — SIGNIFICANT CHANGE UP (ref 3.5–5.3)
POTASSIUM SERPL-SCNC: 4.6 MMOL/L — SIGNIFICANT CHANGE UP (ref 3.5–5.3)
RBC # BLD: 2.85 M/UL — LOW (ref 3.8–5.2)
RBC # FLD: 21.5 % — HIGH (ref 10.3–14.5)
RSV RNA NPH QL NAA+NON-PROBE: SIGNIFICANT CHANGE UP
SARS-COV-2 RNA SPEC QL NAA+PROBE: SIGNIFICANT CHANGE UP
SODIUM SERPL-SCNC: 129 MMOL/L — LOW (ref 135–145)
SOURCE RESPIRATORY: SIGNIFICANT CHANGE UP
WBC # BLD: 5.27 K/UL — SIGNIFICANT CHANGE UP (ref 3.8–10.5)
WBC # FLD AUTO: 5.27 K/UL — SIGNIFICANT CHANGE UP (ref 3.8–10.5)

## 2025-09-01 PROCEDURE — 73080 X-RAY EXAM OF ELBOW: CPT

## 2025-09-01 PROCEDURE — 72125 CT NECK SPINE W/O DYE: CPT

## 2025-09-01 PROCEDURE — 73090 X-RAY EXAM OF FOREARM: CPT | Mod: 26,RT

## 2025-09-01 PROCEDURE — 99285 EMERGENCY DEPT VISIT HI MDM: CPT

## 2025-09-01 PROCEDURE — 71046 X-RAY EXAM CHEST 2 VIEWS: CPT

## 2025-09-01 PROCEDURE — 71046 X-RAY EXAM CHEST 2 VIEWS: CPT | Mod: 26

## 2025-09-01 PROCEDURE — 36415 COLL VENOUS BLD VENIPUNCTURE: CPT

## 2025-09-01 PROCEDURE — 94640 AIRWAY INHALATION TREATMENT: CPT

## 2025-09-01 PROCEDURE — 80048 BASIC METABOLIC PNL TOTAL CA: CPT

## 2025-09-01 PROCEDURE — 70450 CT HEAD/BRAIN W/O DYE: CPT

## 2025-09-01 PROCEDURE — 87637 SARSCOV2&INF A&B&RSV AMP PRB: CPT

## 2025-09-01 PROCEDURE — 85025 COMPLETE CBC W/AUTO DIFF WBC: CPT

## 2025-09-01 PROCEDURE — 83735 ASSAY OF MAGNESIUM: CPT

## 2025-09-01 PROCEDURE — 73130 X-RAY EXAM OF HAND: CPT

## 2025-09-01 PROCEDURE — 73110 X-RAY EXAM OF WRIST: CPT

## 2025-09-01 PROCEDURE — 73110 X-RAY EXAM OF WRIST: CPT | Mod: 26,RT

## 2025-09-01 PROCEDURE — 73090 X-RAY EXAM OF FOREARM: CPT

## 2025-09-01 PROCEDURE — 70450 CT HEAD/BRAIN W/O DYE: CPT | Mod: 26

## 2025-09-01 PROCEDURE — 99284 EMERGENCY DEPT VISIT MOD MDM: CPT | Mod: 25

## 2025-09-01 PROCEDURE — 73080 X-RAY EXAM OF ELBOW: CPT | Mod: 26,RT

## 2025-09-01 PROCEDURE — 72125 CT NECK SPINE W/O DYE: CPT | Mod: 26

## 2025-09-01 PROCEDURE — 73130 X-RAY EXAM OF HAND: CPT | Mod: 26,RT

## 2025-09-01 RX ORDER — IPRATROPIUM BROMIDE AND ALBUTEROL SULFATE .5; 2.5 MG/3ML; MG/3ML
3 SOLUTION RESPIRATORY (INHALATION) ONCE
Refills: 0 | Status: COMPLETED | OUTPATIENT
Start: 2025-09-01 | End: 2025-09-01

## 2025-09-01 RX ADMIN — IPRATROPIUM BROMIDE AND ALBUTEROL SULFATE 3 MILLILITER(S): .5; 2.5 SOLUTION RESPIRATORY (INHALATION) at 19:25

## 2025-09-02 VITALS
OXYGEN SATURATION: 96 % | DIASTOLIC BLOOD PRESSURE: 63 MMHG | SYSTOLIC BLOOD PRESSURE: 136 MMHG | TEMPERATURE: 98 F | RESPIRATION RATE: 18 BRPM | HEART RATE: 98 BPM
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
## 2025-09-03 DIAGNOSIS — S52.514A NONDISPLACED FRACTURE OF RIGHT RADIAL STYLOID PROCESS, INITIAL ENCOUNTER FOR CLOSED FRACTURE: ICD-10-CM
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
## 2025-09-09 ENCOUNTER — NON-APPOINTMENT (OUTPATIENT)
Age: 89
End: 2025-09-09

## 2025-09-12 ENCOUNTER — NON-APPOINTMENT (OUTPATIENT)
Age: 89
End: 2025-09-12

## 2025-09-16 ENCOUNTER — APPOINTMENT (OUTPATIENT)
Dept: INFUSION THERAPY | Facility: CLINIC | Age: 89
End: 2025-09-16